# Patient Record
Sex: FEMALE | Race: WHITE | NOT HISPANIC OR LATINO | Employment: OTHER | URBAN - METROPOLITAN AREA
[De-identification: names, ages, dates, MRNs, and addresses within clinical notes are randomized per-mention and may not be internally consistent; named-entity substitution may affect disease eponyms.]

---

## 2017-01-10 ENCOUNTER — TRANSFERRED RECORDS (OUTPATIENT)
Dept: HEALTH INFORMATION MANAGEMENT | Facility: CLINIC | Age: 67
End: 2017-01-10

## 2017-03-31 ENCOUNTER — TELEPHONE (OUTPATIENT)
Dept: FAMILY MEDICINE | Facility: OTHER | Age: 67
End: 2017-03-31

## 2017-03-31 DIAGNOSIS — M81.0 OSTEOPOROSIS: ICD-10-CM

## 2017-03-31 NOTE — TELEPHONE ENCOUNTER
Fosamax    Last Written Prescription Date: 12/17/2016  Last Fill Quantity: 12, # refills: 0  Last Office Visit with [place], [facility], [place] or [facility] prescribing provider: 12/1/2016       DEXA Scan:  Last order of DX HIP/PELVIS/SPINE was found on 11/7/2013 from Hospital Encounter on 11/7/2013     No order of DX HIP/PELVIS/SPINE W LAT FRACTION ANALYSIS is found.       Creatinine   Date Value Ref Range Status   08/24/2016 0.89 0.52 - 1.04 mg/dL Final       Moni Vasquez MA

## 2017-04-04 RX ORDER — ALENDRONATE SODIUM 70 MG/1
TABLET ORAL
Qty: 4 TABLET | Refills: 0 | Status: SHIPPED | OUTPATIENT
Start: 2017-04-04 | End: 2017-05-01

## 2017-04-04 NOTE — TELEPHONE ENCOUNTER
Routing refill request to provider for review/approval because:  Labs not current:  MARINA Soria, RN, BSN

## 2017-04-24 NOTE — PROGRESS NOTES
SUBJECTIVE:                                                    Love Kaiser is a 67 year old female who presents to clinic today for the following health issues:      HPI    Medication Followup of Fosamax    Taking Medication as prescribed: yes    Side Effects:  None    Medication Helping Symptoms:  yes     MS: The patient reports that she has been taking fosamax for the past 5 years. Patient has a hx of osteopenia. She is inquiring about calcium supplementation.    Problem list and histories reviewed & adjusted, as indicated.  Additional history: as documented    Patient Active Problem List   Diagnosis     Esophageal reflux     Acute gastritis     Slow transit constipation     Osteoporosis     COPD (chronic obstructive pulmonary disease) (H)     Pneumonia     Atopic rhinitis     Hyperlipidemia LDL goal <160     Health Care Home     Hx of colonic polyp     Adenomatous polyp     Advanced directives, counseling/discussion     S/P shoulder replacement     Essential hypertension, benign     Impingement syndrome, shoulder, left     Prinzmetal angina (H)     Hyperlipidemia LDL goal <70     Macular degeneration     Past Surgical History:   Procedure Laterality Date     ARTHROPLASTY SHOULDER Right 5/26/2015    Procedure: ARTHROPLASTY SHOULDER;  Surgeon: Ashutosh Andrews DO;  Location: [facility] OR     C REMOVAL OF OVARY(S)       CL AFF SURGICAL PATHOLOGY  1974    parotid tumor with malignancy     COLONOSCOPY  09/21/09     COLONOSCOPY N/A 7/6/2016    Procedure: COMBINED COLONOSCOPY, SINGLE OR MULTIPLE BIOPSY/POLYPECTOMY BY BIOPSY;  Surgeon: John Bellamy MD;  Location: [facility] GI     EXCISE MASS FOOT  1/17/2012    Procedure:EXCISE MASS FOOT; Excision of Mass Right Foot; Surgeon:CARRIE PAGAN; Location:[facility] OR     FOREIGN BODY REMOVAL  07/08/10    Soft tissue mass w/peripheral metal fragments     HC COLONOSCOPY W BIOPSY  11/22/06     HC COLONOSCOPY W BIOPSY  10/31/07      UGI ENDOSCOPY DIAG W BIOPSY   "11/22/06     HYSTERECTOMY, PAP NO LONGER INDICATED       HYSTERECTOMY, FIDE         Social History   Substance Use Topics     Smoking status: Former Smoker     Packs/day: 0.50     Years: 29.00     Types: Cigarettes     Smokeless tobacco: Never Used     Alcohol use No      Comment: quit drinking     Family History   Problem Relation Age of Onset     Arthritis Mother      RA     CANCER Mother      female organs     CANCER Father      colon           ROS:  Constitutional, HEENT, cardiovascular, pulmonary, GI, , musculoskeletal, neuro, skin, endocrine and psych systems are negative, except as in HPI or otherwise noted     This document serves as a record of the services and decisions personally performed and made by Gauri Kenney MD. It was created on her behalf by Natalie Engel , a trained medical scribe. The creation of this document is based the provider's statements to the medical scribe.  Natalie Engel, May 1, 2017 11:02 AM     OBJECTIVE:                                                    /64  Pulse 64  Temp 97.9  F (36.6  C) (Temporal)  Resp 16  Ht 1.613 m (5' 3.5\")  Wt 77.1 kg (170 lb)  BMI 29.64 kg/m2  Body mass index is 29.64 kg/(m^2).   GENERAL: healthy, alert, well nourished, well hydrated, no distress  MS: extremities- no gross deformities noted, no edema  SKIN: no suspicious lesions, no rashes  PSYCH: Alert and oriented times 3; speech- coherent , normal rate and volume; able to articulate logical thoughts, able to abstract reason, no tangential thoughts, no hallucinations or delusions, affect- normal    No results found for this or any previous visit (from the past 24 hour(s)).     ASSESSMENT/PLAN:                                                        ICD-10-CM    1. Asymptomatic postmenopausal status Z78.0    2. Visit for screening mammogram Z12.31 MA SCREENING DIGITAL BILAT - Future  (s+30)   3. Need for prophylactic vaccination against Streptococcus pneumoniae (pneumococcus) Z23    4. " Osteoporosis M81.0      Discussed the extended use of fosamax already and last DEXA with only osteopenia.  Stop fosamax and discussed given her good calcium intake, would stop or at least decrease calcium supplementation.  Given handout on calicium and planning to get total to 1200 mg daily.  Asks about cardiology f/u.  Which I understood they would see her annually with our visit at 6 months in between, but his last notes are confusing.  She will call to double check on f/u.  Vitals doing well today, could refill meds if needed.      Patient Instructions   -Discontinue fosama.  -Amanuel Dawkins: [phone], ask about follow up.      The information in this document, created by the medical scribe for me, accurately reflects the services I personally performed and the decisions made by me. I have reviewed and approved this document for accuracy.   MD Gauri Freeman MD, MD  [facility]

## 2017-05-01 ENCOUNTER — OFFICE VISIT (OUTPATIENT)
Dept: FAMILY MEDICINE | Facility: OTHER | Age: 67
End: 2017-05-01
Payer: COMMERCIAL

## 2017-05-01 VITALS
HEART RATE: 64 BPM | OXYGEN SATURATION: 98 % | SYSTOLIC BLOOD PRESSURE: 122 MMHG | WEIGHT: 170 LBS | TEMPERATURE: 97.9 F | RESPIRATION RATE: 16 BRPM | HEIGHT: 64 IN | BODY MASS INDEX: 29.02 KG/M2 | DIASTOLIC BLOOD PRESSURE: 64 MMHG

## 2017-05-01 DIAGNOSIS — M81.0 OSTEOPOROSIS: ICD-10-CM

## 2017-05-01 DIAGNOSIS — Z23 NEED FOR PROPHYLACTIC VACCINATION AGAINST STREPTOCOCCUS PNEUMONIAE (PNEUMOCOCCUS): ICD-10-CM

## 2017-05-01 DIAGNOSIS — Z12.31 VISIT FOR SCREENING MAMMOGRAM: ICD-10-CM

## 2017-05-01 DIAGNOSIS — Z78.0 ASYMPTOMATIC POSTMENOPAUSAL STATUS: ICD-10-CM

## 2017-05-01 PROCEDURE — 99213 OFFICE O/P EST LOW 20 MIN: CPT | Mod: 25 | Performed by: FAMILY MEDICINE

## 2017-05-01 PROCEDURE — 90670 PCV13 VACCINE IM: CPT | Performed by: FAMILY MEDICINE

## 2017-05-01 PROCEDURE — G0009 ADMIN PNEUMOCOCCAL VACCINE: HCPCS | Performed by: FAMILY MEDICINE

## 2017-05-01 RX ORDER — ALENDRONATE SODIUM 70 MG/1
TABLET ORAL
Qty: 4 TABLET | Refills: 0 | Status: CANCELLED | OUTPATIENT
Start: 2017-05-01

## 2017-05-01 ASSESSMENT — PAIN SCALES - GENERAL: PAINLEVEL: NO PAIN (0)

## 2017-05-01 NOTE — NURSING NOTE
Screening Questionnaire for Adult Immunization    Are you sick today?   No   Do you have allergies to medications, food, a vaccine component or latex?   No   Have you ever had a serious reaction after receiving a vaccination?   No   Do you have a long-term health problem with heart disease, lung disease, asthma, kidney disease, metabolic disease (e.g. diabetes), anemia, or other blood disorder?   No   Do you have cancer, leukemia, HIV/AIDS, or any other immune system problem?   No   In the past 3 months, have you taken medications that affect  your immune system, such as prednisone, other steroids, or anticancer drugs; drugs for the treatment of rheumatoid arthritis, Crohn s disease, or psoriasis; or have you had radiation treatments?   No   Have you had a seizure, or a brain or other nervous system problem?   No   During the past year, have you received a transfusion of blood or blood     products, or been given immune (gamma) globulin or antiviral drug?   No   For women: Are you pregnant or is there a chance you could become        pregnant during the next month?   No   Have you received any vaccinations in the past 4 weeks?   No     Immunization questionnaire answers were all negative.      MNVFC doesn't apply on this patient    Per orders of Dr. Kenney, injection of prevnar given by Neha Benites. Patient instructed to remain in clinic for 20 minutes afterwards, and to report any adverse reaction to me immediately.       Screening performed by Neha Benites on 5/1/2017 at 11:34 AM.

## 2017-05-01 NOTE — NURSING NOTE
"Chief Complaint   Patient presents with     Consult     Osteoporosis     Health Maintenance     mychart, height, pneumovax, mammogram, dexa, fall risk, yearly hyperlipidema screen, copd action plan       Initial /64  Pulse 64  Temp 97.9  F (36.6  C) (Temporal)  Resp 16  Ht 5' 3.5\" (1.613 m)  Wt 170 lb (77.1 kg)  BMI 29.64 kg/m2 Estimated body mass index is 29.64 kg/(m^2) as calculated from the following:    Height as of this encounter: 5' 3.5\" (1.613 m).    Weight as of this encounter: 170 lb (77.1 kg).  Medication Reconciliation: complete   Neha Benites CMA    "

## 2017-05-01 NOTE — MR AVS SNAPSHOT
After Visit Summary   5/1/2017    Love Kaiser    MRN: [id number]           Patient Information     Date Of Birth          1950        Visit Information        Provider Department      5/1/2017 10:45 AM Gauri Kenney MD [facility]        Today's Diagnoses     Asymptomatic postmenopausal status        Visit for screening mammogram        Need for prophylactic vaccination against Streptococcus pneumoniae (pneumococcus)        Osteoporosis          Care Instructions    -Discontinue fosama.  -Amanuel Dawkins: [phone], ask about follow up.          Follow-ups after your visit        Your next 10 appointments already scheduled     May 04, 2017  3:30 PM CDT   MA SCREENING DIGITAL BILATERAL with [facility]MA[facility]   [facility]    [address] [address] [phone]           Do not use any powder, lotion or deodorant under your arms or on your breast. If you do, we will ask you to remove it before your exam.  Wear comfortable, two-piece clothing.  If you have any allergies, tell your care team.  Bring any previous mammograms from other facilities or have them mailed to the breast center.              Future tests that were ordered for you today     Open Future Orders        Priority Expected Expires Ordered    MA SCREENING DIGITAL BILAT - Future  (s+30) Routine  4/24/2018 5/1/2017            Who to contact     If you have questions or need follow up information about today's clinic visit or your schedule please contact [facility] directly at [phone].  Normal or non-critical lab and imaging results will be communicated to you by MyChart, letter or phone within 4 business days after the clinic has received the results. If you do not hear from us within 7 days, please contact the clinic through MyChart or phone. If you have a critical or abnormal lab result, we will notify you by phone as soon as  "possible.  Submit refill requests through [email or web address] or call your pharmacy and they will forward the refill request to us. Please allow 3 business days for your refill to be completed.          Additional Information About Your Visit        [email or web address] Information     [email or web address] lets you send messages to your doctor, view your test results, renew your prescriptions, schedule appointments and more. To sign up, go to [email or web address] . Click on \"Log in\" on the left side of the screen, which will take you to the Welcome page. Then click on \"Sign up Now\" on the right side of the page.     You will be asked to enter the access code listed below, as well as some personal information. Please follow the directions to create your username and password.     Your access code is: [email or web address]  Expires: 7/15/2017  2:01 PM     Your access code will  in 90 days. If you need help or a new code, please call your [facility] clinic or [phone].        Care EveryWhere ID     This is your Care EveryWhere ID. This could be used by other organizations to access your [facility] medical records  [id number]        Your Vitals Were     Pulse Temperature Respirations Height Pulse Oximetry BMI (Body Mass Index)    64 97.9  F (36.6  C) (Temporal) 16 5' 3.5\" (1.613 m) 98% 29.64 kg/m2       Blood Pressure from Last 3 Encounters:   17 122/64   16 122/86   16 124/78    Weight from Last 3 Encounters:   17 170 lb (77.1 kg)   16 179 lb (81.2 kg)   16 173 lb (78.5 kg)                 Today's Medication Changes          These changes are accurate as of: 17 11:27 AM.  If you have any questions, ask your nurse or doctor.               Stop taking these medicines if you haven't already. Please contact your care team if you have questions.     alendronate 70 MG tablet   Commonly known as:  FOSAMAX   Stopped by:  Gauri Kenney MD                    Primary Care Provider Office Phone # Fax #    Gauri Kenney, " -[phone]       [facility]  [address] [facility] [address]        Thank you!     Thank you for choosing [facility]  for your care. Our goal is always to provide you with excellent care. Hearing back from our patients is one way we can continue to improve our services. Please take a few minutes to complete the written survey that you may receive in the mail after your visit with us. Thank you!             Your Updated Medication List - Protect others around you: Learn how to safely use, store and throw away your medicines at www.disposemymeds.org.          This list is accurate as of: 5/1/17 11:27 AM.  Always use your most recent med list.                   Brand Name Dispense Instructions for use    albuterol 108 (90 BASE) MCG/ACT Inhaler    albuterol    8.5 g    INHALE 2 PUFFS INTO THE LUNGS EVERY 4 HOURS AS NEEDED FOR SHORTNESS OF BREATH / DYSPNEA.       ascorbic acid 500 MG tablet    VITAMIN C    90 Tab    ONE TABLET DAILY       aspirin 81 MG tablet     90 tablet    Take 1 tablet (81 mg) by mouth daily       calcium 600 MG tablet      daily       CARTIA  MG 24 hr capsule   Generic drug:  diltiazem      Take 180 mg by mouth daily       cyanocolbalamin 500 MCG tablet    vitamin  B-12     daily       LIPITOR PO      Take 40 mg by mouth daily       NITROGLYCERIN SL      Place 0.4 mg under the tongue Reported on 5/1/2017       potassium 75 MG Tabs      1 TABLET  DAILY       Vitamin D-3 Super Strength 2000 UNITS tablet   Generic drug:  cholecalciferol      daily

## 2017-05-04 ENCOUNTER — RADIANT APPOINTMENT (OUTPATIENT)
Dept: MAMMOGRAPHY | Facility: OTHER | Age: 67
End: 2017-05-04
Attending: FAMILY MEDICINE
Payer: COMMERCIAL

## 2017-05-04 DIAGNOSIS — Z12.31 VISIT FOR SCREENING MAMMOGRAM: ICD-10-CM

## 2017-05-04 PROCEDURE — G0202 SCR MAMMO BI INCL CAD: HCPCS | Mod: TC

## 2017-05-10 NOTE — PROGRESS NOTES
SUBJECTIVE:                                                    Love Kaiser is a 67 year old female who presents to clinic today for the following health issues:      HPI    Joint Pain     Onset: Sunday     Description:   Location: right shoulder  Character: Sharp and Stabbing    Intensity: severe    Progression of Symptoms: worse    Accompanying Signs & Symptoms:  Other symptoms: radiation of pain to chest/ribcage and swelling    - Can't sleep, every time she moves gets severe pain    History:   Previous similar pain: no       Precipitating factors:   Trauma or overuse: YES- fell on Sunday, tripped over grandchild and hit shoulder - had shoulder replaced about two years ago     -Fell on side of right arm (not on elbow)     Alleviating factors:  Improved by: nothing      - Dr. Andrews did shoulder replacement ~2 years ago       Therapies Tried and outcome: ice, heat, Ibuprofen - takes edge off       Problem list and histories reviewed & adjusted, as indicated.  Additional history: as documented    ROS:  Constitutional, HEENT, cardiovascular, pulmonary, gi and gu systems are negative, except as otherwise noted.    OBJECTIVE:                                                    /86 (BP Location: Right arm, Patient Position: Chair, Cuff Size: Adult Regular)  Pulse 92  Temp 97.9  F (36.6  C) (Oral)  Resp 20  Wt 168 lb (76.2 kg)  SpO2 96%  BMI 29.29 kg/m2  Body mass index is 29.29 kg/(m^2).  GENERAL APPEARANCE: healthy, alert and no distress  EYES: Eyes grossly normal to inspection, PERRLA, conjunctivae and sclerae without injection or discharge, EOM intact   MS: No musculoskeletal defects are noted and gait is age appropriate without ataxia       Left shoulder: Normal ROM, non-tender, no edema, CMS intact, normal sensory exam, normal strength      Right shoulder: Decreased ROM with all movements - painful with external rotation and flexion, tender to palpation of entire glenoid fossa and anterior portion  of scapula, moderate edema to anterior portion of glenoid, CMS intact, normal sensory exam, slightly decreased strength, negative belly press, can not perform Lift-off test, positive Hawking's and Neer's, remainder of exam deferred due to pain        Right elbow: Normal ROM, non-tender, no edema, CMS intact, normal sensory exam, normal strength  SKIN: No suspicious lesions or rashes, hydration status appears adeuqate with normal skin turgor   NEURO: Strength 5+ bilateral upper extremities, sensation intact in distal bilateral upper extremities, mentation- intact, speech- normal, reflexes- symmetric in bilateral upper extremities  PSYCH: Alert and oriented x3; speech- coherent , normal rate and volume; able to articulate logical thoughts, able to abstract reason, no tangential thoughts, no hallucinations or delusions, mentation appears normal, Mood is euthymic. Affect is appropriate for this mood state and bright. Thought content is free of suicidal ideation, hallucinations, and delusions. Dress is adequate and upkept. Eye contact is good during conversation.       Diagnostic Test Results:  XR right shoulder - Preliminary reading - normal bone structure with no evidence of fracture. Final pending review by radiology.        ASSESSMENT/PLAN:                                                        ICD-10-CM    1. Right shoulder injury, initial encounter S49.91XA XR Shoulder Right G/E 3 Views     oxyCODONE (ROXICODONE) 5 MG IR tablet     XR Shoulder Right G/E 3 Views   2. S/P shoulder replacement, right Z96.611 XR Shoulder Right G/E 3 Views     - Reviewed x-ray, no fractures or hardware issues seen, await radiology report   - Due to severe pain on exam and history of shoulder replacement, recommend evaluation by orthopedics   - Referral placed   - Until seen, recommend Ibuprofen during the day and Narcotic pain medication at night, reviewed use and side effects including sedation, no driving on this medication    The  patient indicates understanding of these issues and agrees with the plan.    Follow up: with specialist       Sunshine Cervantes PA-C  [facility]

## 2017-05-11 ENCOUNTER — OFFICE VISIT (OUTPATIENT)
Dept: FAMILY MEDICINE | Facility: OTHER | Age: 67
End: 2017-05-11
Payer: COMMERCIAL

## 2017-05-11 ENCOUNTER — OFFICE VISIT (OUTPATIENT)
Dept: ORTHOPEDICS | Facility: OTHER | Age: 67
End: 2017-05-11
Payer: COMMERCIAL

## 2017-05-11 ENCOUNTER — RADIANT APPOINTMENT (OUTPATIENT)
Dept: GENERAL RADIOLOGY | Facility: OTHER | Age: 67
End: 2017-05-11
Attending: PHYSICIAN ASSISTANT
Payer: COMMERCIAL

## 2017-05-11 VITALS
TEMPERATURE: 97.9 F | BODY MASS INDEX: 29.29 KG/M2 | WEIGHT: 168 LBS | RESPIRATION RATE: 20 BRPM | OXYGEN SATURATION: 96 % | SYSTOLIC BLOOD PRESSURE: 148 MMHG | DIASTOLIC BLOOD PRESSURE: 86 MMHG | HEART RATE: 92 BPM

## 2017-05-11 VITALS — HEIGHT: 64 IN | BODY MASS INDEX: 28.68 KG/M2 | TEMPERATURE: 97.9 F | WEIGHT: 168 LBS

## 2017-05-11 DIAGNOSIS — Z96.611 S/P SHOULDER REPLACEMENT, RIGHT: ICD-10-CM

## 2017-05-11 DIAGNOSIS — S49.91XA RIGHT SHOULDER INJURY, INITIAL ENCOUNTER: ICD-10-CM

## 2017-05-11 DIAGNOSIS — S49.91XA RIGHT SHOULDER INJURY, INITIAL ENCOUNTER: Primary | ICD-10-CM

## 2017-05-11 DIAGNOSIS — Z96.611 S/P SHOULDER HEMIARTHROPLASTY, RIGHT: Primary | ICD-10-CM

## 2017-05-11 PROCEDURE — 73030 X-RAY EXAM OF SHOULDER: CPT | Mod: RT

## 2017-05-11 PROCEDURE — 99213 OFFICE O/P EST LOW 20 MIN: CPT | Performed by: ORTHOPAEDIC SURGERY

## 2017-05-11 PROCEDURE — 99214 OFFICE O/P EST MOD 30 MIN: CPT | Performed by: PHYSICIAN ASSISTANT

## 2017-05-11 RX ORDER — OXYCODONE HYDROCHLORIDE 5 MG/1
5 TABLET ORAL EVERY 4 HOURS PRN
Qty: 30 TABLET | Refills: 0 | Status: SHIPPED | OUTPATIENT
Start: 2017-05-11 | End: 2018-12-04

## 2017-05-11 ASSESSMENT — PAIN SCALES - GENERAL
PAINLEVEL: EXTREME PAIN (9)
PAINLEVEL: MODERATE PAIN (5)

## 2017-05-11 NOTE — PROGRESS NOTES
"Office Visit-Follow up    Chief Complaint: Love Kaiser is a 67 year old female who is being seen for   Chief Complaint   Patient presents with     RECHECK     Right shoulder follow up- fell 5/7/17     Surgical Followup     DOS: 5/26/15~Right shoulder hemiarthroplasty~1 year and 2 weeks POST OP           History of Present Illness:   Presents with a complaint of his shoulder blade pain as well as anterior chest pain. She reports her shoulder is doing quite well. On May 7, 2017 she had a ground-level fall with a direct lateral fracture shoulder. Had some pain that evening. She thought she \"broke her ribs\". Approximately 36 hours later started having some increasing pain into her shoulder blade. Pain is worse with coughing and sneezing. She also has pain with reaching with her shoulder. Rates pain as moderate to severe at times. Describes a sharp ache. She's been taking a rare intermittent ibuprofen.    REVIEW OF SYSTEMS  General: negative for, night sweats, dizziness, fatigue  Resp: No shortness of breath and no cough  CV: negative for chest pain, syncope or near-syncope  GI: negative for nausea, vomiting and diarrhea  : negative for dysuria and hematuria  Musculoskeletal: as above  Neurologic: negative for syncope   Hematologic: negative for bleeding disorder    Physical Exam:  Vitals: Temp 97.9  F (36.6  C) (Temporal)  Ht 5' 3.5\" (1.613 m)  Wt 168 lb (76.2 kg)  BMI 29.29 kg/m2  BMI= Body mass index is 29.29 kg/(m^2).  Constitutional: healthy, alert and no acute distress   Psychiatric: mentation appears normal and affect normal/bright  NEURO: no focal deficits  RESP: Normal with easy respirations and no use of accessory muscles to breathe, no audible wheezing or retractions  CV: RUE:  no edema         Regular rate and rhythm by palpation  SKIN: No erythema, rashes, excoriation, or breakdown. No evidence of infection. , Previous well healed incisions/laceration: Deltopectoral incision well-healed " infection  JOINT/EXTREMITIES:right upper extremity: She has near full normal active range of motion. Resisted supraspinatus does cause some pain along the posterior shoulder blade. She has no focal areas of tenderness including over the shoulder blade and anterior chest. No apparent rib tenderness. She has no gross obvious instability. Distal neurovascular is intact.  GAIT: not tested             Diagnostic Modalities:  right shoulder X-ray: Hemiarthroplasty in place. No evidence of fractures. This includes the ribs.  Independent visualization of the images was performed.      Impression: right shoulder hemiarthroplasty-appearing appears to be functioning well  Right shoulder blade pain possible pulmonary contusion versus occult rib fracture    Plan:  All of the above pertinent physical exam and imaging modalities findings was reviewed with Love.    Treatment options discussed. I recommended ibuprofen 600 mg 3-4 times a day. I've encouraged her to work on deep breathing throughout the day. Her shoulder prosthesis appears to be functioning fine. Her symptoms appear to be more consistent with a pulmonary contusion versus an occult rib fracture. She has no overt respiratory distress.    Red flag symptoms reviewed. She should immediately present to the ER or call 911 if she has increasing shortness of breath.        Return to clinic 1, week(s), or sooner as needed for changes.  Re-x-ray on return: No    Maximino Andrews D.O.

## 2017-05-11 NOTE — MR AVS SNAPSHOT
"              After Visit Summary   2017    Love Kaiser    MRN: [id number]           Patient Information     Date Of Birth          1950        Visit Information        Provider Department      2017 10:00 AM Sunshine Cervantes PA-C [facility]        Today's Diagnoses     Right shoulder injury, initial encounter    -  1    S/P shoulder replacement, right          Care Instructions    - Ibuprofen 600 mg (3 tablets) - three times a day     - Follow up with Dr. Andrews           Follow-ups after your visit        Who to contact     If you have questions or need follow up information about today's clinic visit or your schedule please contact [facility] directly at [phone].  Normal or non-critical lab and imaging results will be communicated to you by siXishart, letter or phone within 4 business days after the clinic has received the results. If you do not hear from us within 7 days, please contact the clinic through siXishart or phone. If you have a critical or abnormal lab result, we will notify you by phone as soon as possible.  Submit refill requests through Alorica or call your pharmacy and they will forward the refill request to us. Please allow 3 business days for your refill to be completed.          Additional Information About Your Visit        siXishart Information     Alorica lets you send messages to your doctor, view your test results, renew your prescriptions, schedule appointments and more. To sign up, go to [email or web address] . Click on \"Log in\" on the left side of the screen, which will take you to the Welcome page. Then click on \"Sign up Now\" on the right side of the page.     You will be asked to enter the access code listed below, as well as some personal information. Please follow the directions to create your username and password.     Your access code is: [email or web address]  Expires: 7/15/2017  2:01 PM     Your access code will  " in 90 days. If you need help or a new code, please call your [facility] or [phone].        Care EveryWhere ID     This is your Care EveryWhere ID. This could be used by other organizations to access your [place] medical records  [id number]        Your Vitals Were     Pulse Temperature Respirations Pulse Oximetry BMI (Body Mass Index)       92 97.9  F (36.6  C) (Oral) 20 96% 29.29 kg/m2        Blood Pressure from Last 3 Encounters:   05/11/17 148/86   05/01/17 122/64   12/01/16 122/86    Weight from Last 3 Encounters:   05/11/17 168 lb (76.2 kg)   05/01/17 170 lb (77.1 kg)   12/01/16 179 lb (81.2 kg)                 Today's Medication Changes          These changes are accurate as of: 5/11/17 10:34 AM.  If you have any questions, ask your nurse or doctor.               Start taking these medicines.        Dose/Directions    oxyCODONE 5 MG IR tablet   Commonly known as:  ROXICODONE   Used for:  Right shoulder injury, initial encounter   Started by:  Sunshine Cervantes PA-C        Dose:  5 mg   Take 1 tablet (5 mg) by mouth every 4 hours as needed for pain maximum 3 tablet(s) per day   Quantity:  30 tablet   Refills:  0            Where to get your medicines      Some of these will need a paper prescription and others can be bought over the counter.  Ask your nurse if you have questions.     Bring a paper prescription for each of these medications     oxyCODONE 5 MG IR tablet                Primary Care Provider Office Phone # Fax #    Gauri Kenney [phone]       [facility]  [address] [address]        Thank you!     Thank you for choosing [facility]  for your care. Our goal is always to provide you with excellent care. Hearing back from our patients is one way we can continue to improve our services. Please take a few minutes to complete the written survey that you may receive in the mail after your visit with us. Thank  you!             Your Updated Medication List - Protect others around you: Learn how to safely use, store and throw away your medicines at www.disposemymeds.org.          This list is accurate as of: 5/11/17 10:34 AM.  Always use your most recent med list.                   Brand Name Dispense Instructions for use    albuterol 108 (90 BASE) MCG/ACT Inhaler    albuterol    8.5 g    INHALE 2 PUFFS INTO THE LUNGS EVERY 4 HOURS AS NEEDED FOR SHORTNESS OF BREATH / DYSPNEA.       ascorbic acid 500 MG tablet    VITAMIN C    90 Tab    ONE TABLET DAILY       aspirin 81 MG tablet     90 tablet    Take 1 tablet (81 mg) by mouth daily       calcium 600 MG tablet      daily       CARTIA  MG 24 hr capsule   Generic drug:  diltiazem      Take 180 mg by mouth daily       cyanocolbalamin 500 MCG tablet    vitamin  B-12     daily       LIPITOR PO      Take 40 mg by mouth daily       NITROGLYCERIN SL      Place 0.4 mg under the tongue Reported on 5/1/2017       oxyCODONE 5 MG IR tablet    ROXICODONE    30 tablet    Take 1 tablet (5 mg) by mouth every 4 hours as needed for pain maximum 3 tablet(s) per day       potassium 75 MG Tabs      1 TABLET  DAILY       Vitamin D-3 Super Strength 2000 UNITS tablet   Generic drug:  cholecalciferol      daily

## 2017-05-11 NOTE — NURSING NOTE
"Chief Complaint   Patient presents with     RECHECK     Right shoulder follow up- fell 5/7/17     Surgical Followup     DOS: 5/26/15~Right shoulder hemiarthroplasty~1 year and 2 weeks POST OP           Initial Temp 97.9  F (36.6  C) (Temporal)  Ht 1.613 m (5' 3.5\")  Wt 76.2 kg (168 lb)  BMI 29.29 kg/m2 Estimated body mass index is 29.29 kg/(m^2) as calculated from the following:    Height as of this encounter: 1.613 m (5' 3.5\").    Weight as of this encounter: 76.2 kg (168 lb).  Medication Reconciliation: complete   SUMMER Jones  "

## 2017-05-11 NOTE — MR AVS SNAPSHOT
After Visit Summary   5/11/2017    Love Kaiser    MRN: [id number]           Patient Information     Date Of Birth          1950        Visit Information        Provider Department      5/11/2017 3:00 PM Ashutosh Andrews DO [facility]         Follow-ups after your visit        Your next 10 appointments already scheduled     May 11, 2017  2:45 PM CDT   XR SHOULDER RIGHT G/E 2 VIEWS with [facility]   [facility]    [address] [id number] [phone]           Please bring a list of your current medicines to your exam. (Include vitamins, minerals and over-thecounter medicines.) Leave your valuables at home.  Tell your doctor if there is a chance you may be pregnant.  You do not need to do anything special for this exam.            May 11, 2017  3:00 PM CDT   Return Visit with Ashutosh Andrews DO   [facility]    [address]  [address]  [facility] [id number] [phone]              Who to contact     If you have questions or need follow up information about today's clinic visit or your schedule please contact [facility] directly at [phone].  Normal or non-critical lab and imaging results will be communicated to you by Sure Secure Solutionshart, letter or phone within 4 business days after the clinic has received the results. If you do not hear from us within 7 days, please contact the clinic through [employer] Solutionshart or phone. If you have a critical or abnormal lab result, we will notify you by phone as soon as possible.  Submit refill requests through [employer]doo (formerly OpenERP) or call your pharmacy and they will forward the refill request to us. Please allow 3 business days for your refill to be completed.          Additional Information About Your Visit        Odoo (formerly OpenERP) Information     Odoo (formerly OpenERP) lets you send messages to your doctor, view your test results, renew your  "prescriptions, schedule appointments and more. To sign up, go to [email or web address] . Click on \"Log in\" on the left side of the screen, which will take you to the Welcome page. Then click on \"Sign up Now\" on the right side of the page.     You will be asked to enter the access code listed below, as well as some personal information. Please follow the directions to create your username and password.     Your access code is: [email or web address]  Expires: 7/15/2017  2:01 PM     Your access code will  in 90 days. If you need help or a new code, please call your [place] clinic or [phone].        Care EveryWhere ID     This is your Care EveryWhere ID. This could be used by other organizations to access your [place] medical records  [id number]        Your Vitals Were     Temperature Height BMI (Body Mass Index)             97.9  F (36.6  C) (Temporal) 1.613 m (5' 3.5\") 29.29 kg/m2          Blood Pressure from Last 3 Encounters:   17 148/86   17 122/64   16 122/86    Weight from Last 3 Encounters:   17 76.2 kg (168 lb)   17 76.2 kg (168 lb)   17 77.1 kg (170 lb)              Today, you had the following     No orders found for display         Today's Medication Changes          These changes are accurate as of: 17  2:40 PM.  If you have any questions, ask your nurse or doctor.               Start taking these medicines.        Dose/Directions    oxyCODONE 5 MG IR tablet   Commonly known as:  ROXICODONE   Used for:  Right shoulder injury, initial encounter   Started by:  Sunshine Cervantes PA-C        Dose:  5 mg   Take 1 tablet (5 mg) by mouth every 4 hours as needed for pain maximum 3 tablet(s) per day   Quantity:  30 tablet   Refills:  0            Where to get your medicines      Some of these will need a paper prescription and others can be bought over the counter.  Ask your nurse if you have questions.     Bring a paper prescription for each of these " medications     oxyCODONE 5 MG IR tablet                Primary Care Provider Office Phone # Fax #    Gauri Kenney [phone]       [facility]  [address] [address]        Thank you!     Thank you for choosing [facility]  for your care. Our goal is always to provide you with excellent care. Hearing back from our patients is one way we can continue to improve our services. Please take a few minutes to complete the written survey that you may receive in the mail after your visit with us. Thank you!             Your Updated Medication List - Protect others around you: Learn how to safely use, store and throw away your medicines at www.disposemymeds.org.          This list is accurate as of: 5/11/17  2:40 PM.  Always use your most recent med list.                   Brand Name Dispense Instructions for use    albuterol 108 (90 BASE) MCG/ACT Inhaler    albuterol    8.5 g    INHALE 2 PUFFS INTO THE LUNGS EVERY 4 HOURS AS NEEDED FOR SHORTNESS OF BREATH / DYSPNEA.       ascorbic acid 500 MG tablet    VITAMIN C    90 Tab    ONE TABLET DAILY       aspirin 81 MG tablet     90 tablet    Take 1 tablet (81 mg) by mouth daily       calcium 600 MG tablet      daily       CARTIA  MG 24 hr capsule   Generic drug:  diltiazem      Take 180 mg by mouth daily       cyanocolbalamin 500 MCG tablet    vitamin  B-12     daily       LIPITOR PO      Take 40 mg by mouth daily       NITROGLYCERIN SL      Place 0.4 mg under the tongue Reported on 5/1/2017       oxyCODONE 5 MG IR tablet    ROXICODONE    30 tablet    Take 1 tablet (5 mg) by mouth every 4 hours as needed for pain maximum 3 tablet(s) per day       potassium 75 MG Tabs      1 TABLET  DAILY       Vitamin D-3 Super Strength 2000 UNITS tablet   Generic drug:  cholecalciferol      daily

## 2017-05-11 NOTE — LETTER
"  5/11/2017       RE: Love Kaiser  [address] [facility] [address]           Dear Colleague,    Thank you for referring your patient, Love Kaiser, to the [facility]. Please see a copy of my visit note below.    Office Visit-Follow up    Chief Complaint: Love Kaiser is a 67 year old female who is being seen for   Chief Complaint   Patient presents with     RECHECK     Right shoulder follow up- fell 5/7/17     Surgical Followup     DOS: 5/26/15~Right shoulder hemiarthroplasty~1 year and 2 weeks POST OP           History of Present Illness:   Presents with a complaint of his shoulder blade pain as well as anterior chest pain. She reports her shoulder is doing quite well. On May 7, 2017 she had a ground-level fall with a direct lateral fracture shoulder. Had some pain that evening. She thought she \"broke her ribs\". Approximately 36 hours later started having some increasing pain into her shoulder blade. Pain is worse with coughing and sneezing. She also has pain with reaching with her shoulder. Rates pain as moderate to severe at times. Describes a sharp ache. She's been taking a rare intermittent ibuprofen.    REVIEW OF SYSTEMS  General: negative for, night sweats, dizziness, fatigue  Resp: No shortness of breath and no cough  CV: negative for chest pain, syncope or near-syncope  GI: negative for nausea, vomiting and diarrhea  : negative for dysuria and hematuria  Musculoskeletal: as above  Neurologic: negative for syncope   Hematologic: negative for bleeding disorder    Physical Exam:  Vitals: Temp 97.9  F (36.6  C) (Temporal)  Ht 5' 3.5\" (1.613 m)  Wt 168 lb (76.2 kg)  BMI 29.29 kg/m2  BMI= Body mass index is 29.29 kg/(m^2).  Constitutional: healthy, alert and no acute distress   Psychiatric: mentation appears normal and affect normal/bright  NEURO: no focal deficits  RESP: Normal with easy respirations and no use of accessory muscles to breathe, no audible wheezing or " retractions  CV: RUE:  no edema         Regular rate and rhythm by palpation  SKIN: No erythema, rashes, excoriation, or breakdown. No evidence of infection. , Previous well healed incisions/laceration: Deltopectoral incision well-healed infection  JOINT/EXTREMITIES:right upper extremity: She has near full normal active range of motion. Resisted supraspinatus does cause some pain along the posterior shoulder blade. She has no focal areas of tenderness including over the shoulder blade and anterior chest. No apparent rib tenderness. She has no gross obvious instability. Distal neurovascular is intact.  GAIT: not tested             Diagnostic Modalities:  right shoulder X-ray: Hemiarthroplasty in place. No evidence of fractures. This includes the ribs.  Independent visualization of the images was performed.      Impression: right shoulder hemiarthroplasty-appearing appears to be functioning well  Right shoulder blade pain possible pulmonary contusion versus occult rib fracture    Plan:  All of the above pertinent physical exam and imaging modalities findings was reviewed with Love.    Treatment options discussed. I recommended ibuprofen 600 mg 3-4 times a day. I've encouraged her to work on deep breathing throughout the day. Her shoulder prosthesis appears to be functioning fine. Her symptoms appear to be more consistent with a pulmonary contusion versus an occult rib fracture. She has no overt respiratory distress.    Red flag symptoms reviewed. She should immediately present to the ER or call 911 if she has increasing shortness of breath.        Return to clinic 1, week(s), or sooner as needed for changes.  Re-x-ray on return: No    Maximino Andrews D.O.          Again, thank you for allowing me to participate in the care of your patient.        Sincerely,              Ashutosh Andrews, DO

## 2017-05-11 NOTE — NURSING NOTE
"Chief Complaint   Patient presents with     Shoulder Pain     Panel Management     MyChart, Fall risk, COPD action plan       Initial /86 (BP Location: Right arm, Patient Position: Chair, Cuff Size: Adult Regular)  Pulse 92  Temp 97.9  F (36.6  C) (Oral)  Resp 20  Wt 168 lb (76.2 kg)  SpO2 96%  BMI 29.29 kg/m2 Estimated body mass index is 29.29 kg/(m^2) as calculated from the following:    Height as of 5/1/17: 5' 3.5\" (1.613 m).    Weight as of this encounter: 168 lb (76.2 kg).  Medication Reconciliation: complete  "

## 2017-05-16 ENCOUNTER — TELEPHONE (OUTPATIENT)
Dept: FAMILY MEDICINE | Facility: OTHER | Age: 67
End: 2017-05-16

## 2017-05-16 NOTE — TELEPHONE ENCOUNTER
Reason for call:  Same Day Appointment  Reason for Call:  Same Day Appointment, Requested Provider:  Gauri Kenney MD     PCP: Gauri Kenney    Reason for visit: fu from [facility] broken ribs    Duration of symptoms: a week ago    Have you been treated for this in the past? Yes    Additional comments: is wondering if she could be worked in tomorrow with Dr Kenney. Ok to call tomorrow. Declined another provider    Can we leave a detailed message on this number? YES    Phone number patient can be reached at: Home number on file [phone] (home)    Best Time: any    Call taken on 5/16/2017 at 8:08 AM by Hien Wheat

## 2017-05-17 ENCOUNTER — OFFICE VISIT (OUTPATIENT)
Dept: FAMILY MEDICINE | Facility: OTHER | Age: 67
End: 2017-05-17
Payer: COMMERCIAL

## 2017-05-17 VITALS
DIASTOLIC BLOOD PRESSURE: 78 MMHG | RESPIRATION RATE: 16 BRPM | WEIGHT: 170 LBS | HEART RATE: 68 BPM | SYSTOLIC BLOOD PRESSURE: 122 MMHG | OXYGEN SATURATION: 96 % | HEIGHT: 64 IN | BODY MASS INDEX: 29.02 KG/M2 | TEMPERATURE: 99.2 F

## 2017-05-17 DIAGNOSIS — S22.41XD CLOSED FRACTURE OF MULTIPLE RIBS OF RIGHT SIDE WITH ROUTINE HEALING, SUBSEQUENT ENCOUNTER: Primary | ICD-10-CM

## 2017-05-17 PROCEDURE — 99213 OFFICE O/P EST LOW 20 MIN: CPT | Performed by: FAMILY MEDICINE

## 2017-05-17 RX ORDER — OXYCODONE HYDROCHLORIDE 5 MG/1
5 TABLET ORAL EVERY 4 HOURS PRN
COMMUNITY
Start: 2017-05-14 | End: 2017-05-17

## 2017-05-17 RX ORDER — DOCUSATE SODIUM 100 MG/1
100 CAPSULE, LIQUID FILLED ORAL DAILY
COMMUNITY
Start: 2017-05-14 | End: 2019-01-30

## 2017-05-17 RX ORDER — OXYCODONE HYDROCHLORIDE 5 MG/1
5 TABLET ORAL EVERY 4 HOURS PRN
Qty: 50 TABLET | Refills: 0 | Status: SHIPPED | OUTPATIENT
Start: 2017-05-17 | End: 2017-05-26

## 2017-05-17 ASSESSMENT — PAIN SCALES - GENERAL: PAINLEVEL: MODERATE PAIN (4)

## 2017-05-17 NOTE — PATIENT INSTRUCTIONS
-Deep breaths every 1 hours.   -Limit pain medication to 6 per day. Follow up if your pain is not improving and before you run out of meds.

## 2017-05-17 NOTE — PROGRESS NOTES
SUBJECTIVE:                                                    Love Kaiser is a 67 year old female who presents to clinic today for the following health issues:    HPI    ED/UC Followup:    Facility:  [facility]  Date of visit: 5/14/17  Reason for visit: Fx ribs  Current Status: still painful but improving     MS: The patient is following up from ED for right sided rib fractures. The patient is taking oxycodone for her pain and notes that she sometimes takes 2 tablets at a time for her pain. He last dosage was at 9 AM today.    Problem list and histories reviewed & adjusted, as indicated.  Additional history: as documented    Patient Active Problem List   Diagnosis     Esophageal reflux     Acute gastritis     Slow transit constipation     Osteoporosis     COPD (chronic obstructive pulmonary disease) (H)     Pneumonia     Atopic rhinitis     Hyperlipidemia LDL goal <160     Health Care Home     Hx of colonic polyp     Adenomatous polyp     Advanced directives, counseling/discussion     S/P shoulder replacement     Essential hypertension, benign     Impingement syndrome, shoulder, left     Prinzmetal angina (H)     Hyperlipidemia LDL goal <70     Macular degeneration     Past Surgical History:   Procedure Laterality Date     ARTHROPLASTY SHOULDER Right 5/26/2015    Procedure: ARTHROPLASTY SHOULDER;  Surgeon: Ashutosh Andrews DO;  Location: [facility] OR     C REMOVAL OF OVARY(S)       CL AFF SURGICAL PATHOLOGY  1974    parotid tumor with malignancy     COLONOSCOPY  09/21/09     COLONOSCOPY N/A 7/6/2016    Procedure: COMBINED COLONOSCOPY, SINGLE OR MULTIPLE BIOPSY/POLYPECTOMY BY BIOPSY;  Surgeon: John Bellamy MD;  Location: [facility] GI     EXCISE MASS FOOT  1/17/2012    Procedure:EXCISE MASS FOOT; Excision of Mass Right Foot; Surgeon:CARRIE PAGAN; Location:[facility] OR     FOREIGN BODY REMOVAL  07/08/10    Soft tissue mass w/peripheral metal fragments     HC COLONOSCOPY W BIOPSY  11/22/06     HC  "COLONOSCOPY W BIOPSY  10/31/07      UGI ENDOSCOPY DIAG W BIOPSY  11/22/06     HYSTERECTOMY, PAP NO LONGER INDICATED       HYSTERECTOMY, FIDE         Social History   Substance Use Topics     Smoking status: Current Every Day Smoker     Packs/day: 0.50     Years: 29.00     Types: Cigarettes     Smokeless tobacco: Never Used     Alcohol use No      Comment: quit drinking     Family History   Problem Relation Age of Onset     Arthritis Mother      RA     CANCER Mother      female organs     CANCER Father      colon           ROS:  Constitutional, HEENT, cardiovascular, pulmonary, GI, , musculoskeletal, neuro, skin, endocrine and psych systems are negative, except as in HPI or otherwise noted     This document serves as a record of the services and decisions personally performed and made by Gauri Kenney MD. It was created on her behalf by Natalie Engel , a trained medical scribe. The creation of this document is based the provider's statements to the medical scribe.  Natalie Engel, May 17, 2017 2:46 PM     OBJECTIVE:                                                    /78  Pulse 68  Temp 99.2  F (37.3  C) (Temporal)  Resp 16  Ht 1.613 m (5' 3.5\")  Wt 77.1 kg (170 lb)  SpO2 96%  BMI 29.64 kg/m2  Body mass index is 29.64 kg/(m^2).   GENERAL: healthy, alert, well nourished, well hydrated, no distress  RESP: initial pop open sound to lungs.  Later --lungs clear to auscultation - no rales, no rhonchi, no wheezes  CV: regular rates and rhythm, normal S1 S2, no S3 or S4 and no murmur, no click or rub -  MS: extremities- no gross deformities noted, no edema  SKIN: no suspicious lesions, no rashes  PSYCH: Alert and oriented times 3; speech- coherent , normal rate and volume; able to articulate logical thoughts, able to abstract reason, no tangential thoughts, no hallucinations or delusions, affect- normal    No results found for this or any previous visit (from the past 24 hour(s)).     ASSESSMENT/PLAN:            "                                             ICD-10-CM    1. Closed fracture of multiple ribs of right side with routine healing, subsequent encounter S22.41XD oxyCODONE (ROXICODONE) 5 MG IR tablet     Every 1 hour as awake for I/S.  Very important as she is at high risk for pneumonia given her respiratory history.  Pain meds given, f/u if pain is not improved or if needing pain meds in another week to make sure no pneumonia.      Patient Instructions   -Deep breaths every 2-3 hours.   -Limit pain medication to 6 per day. Follow up if your pain is not improving.    The information in this document, created by the medical scribe for me, accurately reflects the services I personally performed and the decisions made by me. I have reviewed and approved this document for accuracy.   MD Gauri Freeman MD, MD  [facility]

## 2017-05-17 NOTE — NURSING NOTE
"Chief Complaint   Patient presents with     ER F/U       Initial /78  Pulse 68  Temp 99.2  F (37.3  C) (Temporal)  Resp 16  Ht 5' 3.5\" (1.613 m)  Wt 170 lb (77.1 kg)  SpO2 96%  BMI 29.64 kg/m2 Estimated body mass index is 29.64 kg/(m^2) as calculated from the following:    Height as of this encounter: 5' 3.5\" (1.613 m).    Weight as of this encounter: 170 lb (77.1 kg).  Medication Reconciliation: complete  "

## 2017-05-26 DIAGNOSIS — S22.41XD CLOSED FRACTURE OF MULTIPLE RIBS OF RIGHT SIDE WITH ROUTINE HEALING, SUBSEQUENT ENCOUNTER: ICD-10-CM

## 2017-05-26 RX ORDER — OXYCODONE HYDROCHLORIDE 5 MG/1
5 TABLET ORAL EVERY 4 HOURS PRN
Qty: 15 TABLET | Refills: 0 | Status: SHIPPED | OUTPATIENT
Start: 2017-05-26 | End: 2018-12-04

## 2017-05-26 NOTE — TELEPHONE ENCOUNTER
Per pt, she has 3-4 remaining, and due to the long weekend wondering if she could have a couple more.  She stated has a lot of work to do in the yard therefore she know's she will experience pain.     Oxycodone      Last Written Prescription Date:  05/17/2017  Last Fill Quantity: 50 tab,   # refills: 0  Last Office Visit with [facility], [facility] or  [facility] prescribing provider: 05/17/2017  Future Office visit:       Routing refill request to provider for review/approval because:  Drug not on the [facility], [facility] or [facility] refill protocol or controlled substance    Routing to AE and provider pool to review in AE's absence

## 2017-05-26 NOTE — TELEPHONE ENCOUNTER
Reason for Call:  Medication or medication refill:    Do you use a [facility]?  Name of the pharmacy and phone number for the current request:  [facility] - [phone]    Name of the medication requested: Oxycodone 5mg prn    Other request: Pt has 3-4 remaining, and due to the long weekend wondering if she could have a couple more.  She stated has a lot of work to do in the yard therefore she know's she will experience pain.    Can we leave a detailed message on this number? YES    Phone number patient can be reached at: Home number on file [phone] (home)    Best Time: anytime    Call taken on 5/26/2017 at 7:18 AM by Gala Boothe

## 2017-11-08 DIAGNOSIS — E78.5 HYPERLIPEMIA: Primary | ICD-10-CM

## 2017-11-08 LAB
CHOLEST SERPL-MCNC: 157 MG/DL
HDLC SERPL-MCNC: 64 MG/DL
LDLC SERPL CALC-MCNC: 67 MG/DL
NONHDLC SERPL-MCNC: 93 MG/DL
TRIGL SERPL-MCNC: 132 MG/DL

## 2017-11-08 PROCEDURE — 36415 COLL VENOUS BLD VENIPUNCTURE: CPT | Performed by: INTERNAL MEDICINE

## 2017-11-08 PROCEDURE — 80061 LIPID PANEL: CPT | Performed by: INTERNAL MEDICINE

## 2017-12-06 ENCOUNTER — TRANSFERRED RECORDS (OUTPATIENT)
Dept: HEALTH INFORMATION MANAGEMENT | Facility: CLINIC | Age: 67
End: 2017-12-06

## 2017-12-09 DIAGNOSIS — J41.0 SIMPLE CHRONIC BRONCHITIS (H): ICD-10-CM

## 2017-12-09 DIAGNOSIS — J44.1 COPD EXACERBATION (H): ICD-10-CM

## 2017-12-11 RX ORDER — ALBUTEROL SULFATE 90 UG/1
AEROSOL, METERED RESPIRATORY (INHALATION)
Qty: 8.5 G | Refills: 5 | Status: SHIPPED | OUTPATIENT
Start: 2017-12-11 | End: 2019-02-13

## 2017-12-11 NOTE — TELEPHONE ENCOUNTER
Requested Prescriptions   Pending Prescriptions Disp Refills     PROAIR  (90 BASE) MCG/ACT inhaler [Pharmacy Med Name: ProAir HFA Inhalation Aerosol Solution 108 (90 Base) MCG/ACT] 8.5 g 0     Sig: INHALE TWO PUFFS BY MOUTH EVERY FOUR HOURS AS NEEDED FOR SHORTNESS OF BREATH OR DYSPNEA    Asthma Maintenance Inhalers - Anticholinergics Passed    12/11/2017  1:39 PM       Passed - Patient is age 12 years or older       Passed - Recent or future visit with authorizing provider's specialty    Patient had office visit in the last year or has a visit in the next 30 days with authorizing provider.  See chart review.               albuterol (ALBUTEROL) 108 (90 BASE) MCG/ACT Inhaler  Prescription approved per [facility] Refill Protocol.    Lesvia Haley, RN, BSN

## 2018-01-30 ENCOUNTER — TRANSFERRED RECORDS (OUTPATIENT)
Dept: HEALTH INFORMATION MANAGEMENT | Facility: CLINIC | Age: 68
End: 2018-01-30

## 2018-09-04 ENCOUNTER — OFFICE VISIT (OUTPATIENT)
Dept: FAMILY MEDICINE | Facility: OTHER | Age: 68
End: 2018-09-04
Payer: COMMERCIAL

## 2018-09-04 ENCOUNTER — TELEPHONE (OUTPATIENT)
Dept: FAMILY MEDICINE | Facility: OTHER | Age: 68
End: 2018-09-04

## 2018-09-04 ENCOUNTER — RADIANT APPOINTMENT (OUTPATIENT)
Dept: GENERAL RADIOLOGY | Facility: OTHER | Age: 68
End: 2018-09-04
Attending: PHYSICIAN ASSISTANT
Payer: COMMERCIAL

## 2018-09-04 VITALS
HEART RATE: 90 BPM | TEMPERATURE: 98.3 F | HEIGHT: 62 IN | WEIGHT: 161 LBS | DIASTOLIC BLOOD PRESSURE: 78 MMHG | OXYGEN SATURATION: 93 % | SYSTOLIC BLOOD PRESSURE: 128 MMHG | RESPIRATION RATE: 16 BRPM | BODY MASS INDEX: 29.63 KG/M2

## 2018-09-04 DIAGNOSIS — J44.1 COPD EXACERBATION (H): ICD-10-CM

## 2018-09-04 DIAGNOSIS — R05.9 COUGH: Primary | ICD-10-CM

## 2018-09-04 DIAGNOSIS — J22 LOWER RESPIRATORY TRACT INFECTION: ICD-10-CM

## 2018-09-04 DIAGNOSIS — R05.9 COUGH: ICD-10-CM

## 2018-09-04 PROCEDURE — 71046 X-RAY EXAM CHEST 2 VIEWS: CPT

## 2018-09-04 PROCEDURE — 99214 OFFICE O/P EST MOD 30 MIN: CPT | Performed by: PHYSICIAN ASSISTANT

## 2018-09-04 RX ORDER — PREDNISONE 20 MG/1
20 TABLET ORAL DAILY
Qty: 5 TABLET | Refills: 0 | Status: SHIPPED | OUTPATIENT
Start: 2018-09-04 | End: 2018-12-04

## 2018-09-04 RX ORDER — AZITHROMYCIN 250 MG/1
TABLET, FILM COATED ORAL
Qty: 6 TABLET | Refills: 0 | Status: SHIPPED | OUTPATIENT
Start: 2018-09-04 | End: 2018-12-04

## 2018-09-04 ASSESSMENT — PAIN SCALES - GENERAL: PAINLEVEL: NO PAIN (0)

## 2018-09-04 NOTE — TELEPHONE ENCOUNTER
Reason for call:  Patient reporting a symptom    Symptom or request: cough/breathing    Duration (how long have symptoms been present): 5 days     Have you been treated for this before? No    Additional comments: pt states thinks maybe has pneumonia or some virus. Pt is only wanting to see henrymayela. Was offered other providers. Pt wondering if henrymayela can work pt in tomorrow. Please advise    Phone Number patient can be reached at:  Home number on file [phone] (home)    Best Time:  ANY    Can we leave a detailed message on this number:  YES    Call taken on 9/4/2018 at 10:03 AM by Bijal Layne

## 2018-09-04 NOTE — PATIENT INSTRUCTIONS
- Azithromycin take as instructed for 5 days, lasts approximately for 10 days  - Start prednisone in the morning, take with food in stomach, once daily.    - Recommend starting over the counter plain mucinex   - Stay hydrated  - Albuterol every 4-6 hours  - Follow-up if not improving in the next 3-5 days, sooner if worse or new concerns.

## 2018-09-04 NOTE — PROGRESS NOTES
"  SUBJECTIVE:   Love Kaiser is a 68 year old female who presents to clinic today for the following health issues:      HPI  Acute Illness   Acute illness concerns: cough and SOB  Onset: Thursday morning    Fever: YES    Chills/Sweats: no    Headache (location?): YES    Sinus Pressure:no    Conjunctivitis:  YES: both    Ear Pain: no    Rhinorrhea: YES    Congestion: YES    Sore Throat: no     Headache: YES x 1 day.     Cough: YES-productive of clear sputum, productive of yellow sputum, with shortness of breath, worsening over time    Wheeze: YES    Decreased Appetite: YES    Nausea: no    Vomiting: no    Diarrhea:  no    Dysuria/Freq.: YES    Fatigue/Achiness: YES    Sick/Strep Exposure: YES; grand daughter     Therapies Tried and outcome: alkaseltzer plus- no aid  -Has been using Albuterol 6 times throughout the day during exertion for the last 6 days, gives immediate relief but states her symptoms have been getting worse overall.    - RN Triage 09/04/18:    Love Kaiser is a 68 year old female who calls with cough and SOB.     NURSING ASSESSMENT:  Description: Patient has a productive cough (unknown color as she is legally blind), congestion, and \"hard time breathing\". Breathing is constant, but worse with activity.   Onset/duration:  Thursday 8/30  Precip. factors: History of COPD. Has had pneumonia previously  Associated symptoms: \"Shaky\". \"Feverish\" at night, but doesn't have a thermometer  Improves/worsens symptoms: Inhalers help \"for a little bit\". Sharon Prince was helping, but not anymore  Pain scale (0-10)   0/10  Last exam/Treatment: 5/2017 with [facility].   Allergies:        Allergies   Allergen Reactions     Nickel Itching     No Known Drug Allergies           RECOMMENDED DISPOSITION:  See in 2-4 hours. Patient needs to find a ride, so is declining alternate locations. She does not want to go to [facility] as they did not have xray last time she was there. Will ask for work in this afternoon " with providers in clinic.   Will comply with recommendation: YES   If further questions/concerns or if Sx do not improve, worsen or new Sx develop, call your PCP or [facility] as soon as possible.     NOTES:  Disposition was determined by the first positive assessment question, therefore all previous assessment questions were negative.      Guideline used:  Telephone Triage Protocols for Nurses, Fifth Edition, Elisa Soria, RN, BSN      Problem list and histories reviewed & adjusted, as indicated.  Additional history: as documented    Patient Active Problem List   Diagnosis     Esophageal reflux     Acute gastritis     Slow transit constipation     Osteoporosis     COPD (chronic obstructive pulmonary disease) (H)     Pneumonia     Atopic rhinitis     Hyperlipidemia LDL goal <160     Health Care Home     Hx of colonic polyp     Adenomatous polyp     Advanced directives, counseling/discussion     S/P shoulder replacement     Essential hypertension, benign     Impingement syndrome, shoulder, left     Prinzmetal angina (H)     Hyperlipidemia LDL goal <70     Macular degeneration     Past Surgical History:   Procedure Laterality Date     ARTHROPLASTY SHOULDER Right 5/26/2015    Procedure: ARTHROPLASTY SHOULDER;  Surgeon: Ashutosh Andrews DO;  Location: [facility] OR     C REMOVAL OF OVARY(S)       [facility] AFF SURGICAL PATHOLOGY  1974    parotid tumor with malignancy     COLONOSCOPY  09/21/09     COLONOSCOPY N/A 7/6/2016    Procedure: COMBINED COLONOSCOPY, SINGLE OR MULTIPLE BIOPSY/POLYPECTOMY BY BIOPSY;  Surgeon: John Bellamy MD;  Location: [facility] GI     EXCISE MASS FOOT  1/17/2012    Procedure:EXCISE MASS FOOT; Excision of Mass Right Foot; Surgeon:CARRIE PAGAN; Location:[facility] OR     FOREIGN BODY REMOVAL  07/08/10    Soft tissue mass w/peripheral metal fragments     HC COLONOSCOPY W BIOPSY  11/22/06     [facility] COLONOSCOPY W BIOPSY  10/31/07      UGI ENDOSCOPY DIAG W BIOPSY  11/22/06      HYSTERECTOMY, PAP NO LONGER INDICATED       HYSTERECTOMY, FIDE         Social History   Substance Use Topics     Smoking status: Current Every Day Smoker     Packs/day: 0.50     Years: 29.00     Types: Cigarettes     Smokeless tobacco: Never Used     Alcohol use No      Comment: quit drinking     Family History   Problem Relation Age of Onset     Arthritis Mother      RA     Cancer Mother      female organs     Cancer Father      colon         Current Outpatient Prescriptions   Medication Sig Dispense Refill     aspirin 81 MG tablet Take 1 tablet (81 mg) by mouth daily 90 tablet 3     Atorvastatin Calcium (LIPITOR PO) Take 40 mg by mouth daily       azithromycin (ZITHROMAX) 250 MG tablet Two tablets first day, then one tablet daily for four days. 6 tablet 0     CARTIA  MG 24 hr capsule Take 180 mg by mouth daily  8     NITROGLYCERIN SL Place 0.4 mg under the tongue Reported on 5/17/2017       POTASSIUM 75 MG OR TABS 1 TABLET  DAILY       predniSONE (DELTASONE) 20 MG tablet Take 1 tablet (20 mg) by mouth daily 5 tablet 0     PROAIR  (90 BASE) MCG/ACT inhaler INHALE TWO PUFFS BY MOUTH EVERY FOUR HOURS AS NEEDED FOR SHORTNESS OF BREATH OR DYSPNEA 8.5 g 5     VITAMIN B-12 500 MCG OR TABS daily       VITAMIN C 500 MG OR TABS ONE TABLET DAILY 90 Tab 3     VITAMIN D-3 SUPER STRENGTH 2000 UNIT OR TABS daily       CALCIUM 600 MG OR TABS daily  3     docusate sodium (COLACE) 100 MG capsule Take 100 mg by mouth daily       oxyCODONE (ROXICODONE) 5 MG IR tablet Take 1 tablet (5 mg) by mouth every 4 hours as needed (Patient not taking: Reported on 9/4/2018) 15 tablet 0     oxyCODONE (ROXICODONE) 5 MG IR tablet Take 1 tablet (5 mg) by mouth every 4 hours as needed for pain maximum 3 tablet(s) per day (Patient not taking: Reported on 9/4/2018) 30 tablet 0     Allergies   Allergen Reactions     Nickel Itching     No Known Drug Allergies        ROS:  Constitutional, HEENT, cardiovascular, pulmonary, gi and gu systems  "are negative, except as otherwise noted. Patient reports urinary frequency is increased as a result of better hydration. Patient states she occasionally gets headaches.     OBJECTIVE:     /78  Pulse 90  Temp 98.3  F (36.8  C) (Temporal)  Resp 16  Ht 5' 2.05\" (1.576 m)  Wt 161 lb (73 kg)  SpO2 93%  BMI 29.4 kg/m2  Body mass index is 29.4 kg/(m^2).  GENERAL: healthy, alert and no distress  EYES: Eyes grossly normal to inspection, PERRL and conjunctivae and sclerae normal  HENT: ear canals and TM's normal, nose and mouth without ulcers or lesions  NECK: no adenopathy, no asymmetry, masses, or scars and thyroid normal to palpation  RESP: rhonchi diffusely bilaterally and faint crackle in left lower lobe   CV: regular rate and rhythm, normal S1 S2, no S3 or S4, no murmur, click or rub, no peripheral edema   MS: no gross musculoskeletal defects noted, no edema    Diagnostic Test Results:  Xray - Pending. Question left side lower lobe consolidation.    ASSESSMENT/PLAN:       ICD-10-CM    1. Cough R05 XR Chest 2 Views   2. Lower respiratory tract infection J22 azithromycin (ZITHROMAX) 250 MG tablet     predniSONE (DELTASONE) 20 MG tablet   3. COPD exacerbation (H) J44.1 azithromycin (ZITHROMAX) 250 MG tablet     predniSONE (DELTASONE) 20 MG tablet       The possible diagnosis of pneumonia was discussed due to reported symptoms, sick contact last week, and faint crackles in the left lower lobe were heard on exam. Chest x-ray shows a questionable left side lower lobe consolidation, pending reading by radiology.   Begin Azithromycin 250 mg bid today then 250 mg qday x 4 days as patient is high risk with her diagnosis of COPD and continues to smoke. Begin prednisone 20 mg qam x 5 days to help with COPD exacerbation, discussed potential side effects and how to administer the medication. Continue albuterol inhaler q4-6 hours prn. Mucinex OTC was discussed to help with congestion. Plan to follow up in 3-5 days if " symptoms have not improved. Patient was encouraged to call the clinic with any concerns prior to follow up.     Options for treatment and follow-up care were reviewed with the patient and/or guardian. Patient and/or guardian engaged in the decision making process and verbalized understanding of the options discussed and agreed with the final plan.     Irma Saez PA-C  [facility]

## 2018-09-04 NOTE — MR AVS SNAPSHOT
After Visit Summary   9/4/2018    Love Kaiser    MRN: [id number]           Patient Information     Date Of Birth          1950        Visit Information        Provider Department      9/4/2018 2:20 PM Irma Saez PA-C [facility]        Today's Diagnoses     Cough    -  1    Lower respiratory tract infection        COPD exacerbation (H)          Care Instructions    - Azithromycin take as instructed for 5 days, lasts approximately for 10 days  - Start prednisone in the morning, take with food in stomach, once daily.    - Recommend starting over the counter plain mucinex   - Stay hydrated  - Albuterol every 4-6 hours  - Follow-up if not improving in the next 3-5 days, sooner if worse or new concerns.           Follow-ups after your visit        Follow-up notes from your care team     Return in about 5 days (around 9/9/2018) for re-check if not improved.      Who to contact     If you have questions or need follow up information about today's clinic visit or your schedule please contact [facility] directly at [phone].  Normal or non-critical lab and imaging results will be communicated to you by MyChart, letter or phone within 4 business days after the clinic has received the results. If you do not hear from us within 7 days, please contact the clinic through MyChart or phone. If you have a critical or abnormal lab result, we will notify you by phone as soon as possible.  Submit refill requests through [employer] or call your pharmacy and they will forward the refill request to us. Please allow 3 business days for your refill to be completed.          Additional Information About Your Visit        Care EveryWhere ID     This is your Care EveryWhere ID. This could be used by other organizations to access your [facility] medical records  [id number]        Your Vitals Were     Pulse Temperature Respirations Height Pulse Oximetry BMI (Body Mass Index)    90  "98.3  F (36.8  C) (Temporal) 16 5' 2.05\" (1.576 m) 93% 29.4 kg/m2       Blood Pressure from Last 3 Encounters:   09/04/18 128/78   05/17/17 122/78   05/11/17 148/86    Weight from Last 3 Encounters:   09/04/18 161 lb (73 kg)   05/17/17 170 lb (77.1 kg)   05/11/17 168 lb (76.2 kg)                 Today's Medication Changes          These changes are accurate as of 9/4/18  3:03 PM.  If you have any questions, ask your nurse or doctor.               Start taking these medicines.        Dose/Directions    azithromycin 250 MG tablet   Commonly known as:  ZITHROMAX   Used for:  Lower respiratory tract infection, COPD exacerbation (H)   Started by:  Irma Saez PA-C        Two tablets first day, then one tablet daily for four days.   Quantity:  6 tablet   Refills:  0       predniSONE 20 MG tablet   Commonly known as:  DELTASONE   Used for:  Lower respiratory tract infection, COPD exacerbation (H)   Started by:  Irma Saez PA-C        Dose:  20 mg   Take 1 tablet (20 mg) by mouth daily   Quantity:  5 tablet   Refills:  0            Where to get your medicines      These medications were sent to [facility] [address] [place], [place] [id number]     Phone:  [phone]     azithromycin 250 MG tablet    predniSONE 20 MG tablet                Primary Care Provider Office Phone # Fax #    Gauri Mercedez Kenney [phone]       [address] [place] [id number]        Equal Access to Services     [facility] Hospital[facility]AN[facility] AH: Pavel paiz Soamrita, waaxda luqadaha, qaybta kaalmada zev virk. So [facility] [phone].    ATENCIÓN: Si habla español, tiene a elizalde disposición servicios gratuitos de asistencia lingüística. Llame al [phone].    We comply with applicable federal civil rights laws and [place] laws. We do not discriminate on the basis of race, color, national origin, age, disability, sex, sexual orientation, or " gender identity.            Thank you!     Thank you for choosing [facility]  for your care. Our goal is always to provide you with excellent care. Hearing back from our patients is one way we can continue to improve our services. Please take a few minutes to complete the written survey that you may receive in the mail after your visit with us. Thank you!             Your Updated Medication List - Protect others around you: Learn how to safely use, store and throw away your medicines at www.disposemymeds.org.          This list is accurate as of 9/4/18  3:03 PM.  Always use your most recent med list.                   Brand Name Dispense Instructions for use Diagnosis    ascorbic acid 500 MG tablet    VITAMIN C    90 Tab    ONE TABLET DAILY        aspirin 81 MG tablet     90 tablet    Take 1 tablet (81 mg) by mouth daily    Prinzmetal angina (H)       azithromycin 250 MG tablet    ZITHROMAX    6 tablet    Two tablets first day, then one tablet daily for four days.    Lower respiratory tract infection, COPD exacerbation (H)       calcium 600 MG tablet      daily        CARTIA  MG 24 hr capsule   Generic drug:  diltiazem      Take 180 mg by mouth daily        cyanocolbalamin 500 MCG tablet    vitamin  B-12     daily        docusate sodium 100 MG capsule    COLACE     Take 100 mg by mouth daily        LIPITOR PO      Take 40 mg by mouth daily        NITROGLYCERIN SL      Place 0.4 mg under the tongue Reported on 5/17/2017        * oxyCODONE IR 5 MG tablet    ROXICODONE    30 tablet    Take 1 tablet (5 mg) by mouth every 4 hours as needed for pain maximum 3 tablet(s) per day    Right shoulder injury, initial encounter       * oxyCODONE IR 5 MG tablet    ROXICODONE    15 tablet    Take 1 tablet (5 mg) by mouth every 4 hours as needed    Closed fracture of multiple ribs of right side with routine healing, subsequent encounter       potassium 75 MG Tabs      1 TABLET  DAILY        predniSONE 20 MG  tablet    DELTASONE    5 tablet    Take 1 tablet (20 mg) by mouth daily    Lower respiratory tract infection, COPD exacerbation (H)       PROAIR  (90 Base) MCG/ACT inhaler   Generic drug:  albuterol     8.5 g    INHALE TWO PUFFS BY MOUTH EVERY FOUR HOURS AS NEEDED FOR SHORTNESS OF BREATH OR DYSPNEA    Simple chronic bronchitis (H), COPD exacerbation (H)       Vitamin D-3 Super Strength 2000 units tablet   Generic drug:  cholecalciferol      daily        * Notice:  This list has 2 medication(s) that are the same as other medications prescribed for you. Read the directions carefully, and ask your doctor or other care provider to review them with you.

## 2018-09-04 NOTE — TELEPHONE ENCOUNTER
"Love Kaiser is a 68 year old female who calls with cough and SOB.    NURSING ASSESSMENT:  Description: Patient has a productive cough (unknown color as she is legally blind), congestion, and \"hard time breathing\". Breathing is constant, but worse with activity.   Onset/duration:  Thursday 8/30  Precip. factors: History of COPD. Has had pneumonia previously  Associated symptoms: \"Shaky\". \"Feverish\" at night, but doesn't have a thermometer  Improves/worsens symptoms: Inhalers help \"for a little bit\". Sharon Prince was helping, but not anymore  Pain scale (0-10)   0/10  Last exam/Treatment: 5/2017 with [facility].   Allergies:   Allergies   Allergen Reactions     Nickel Itching     No Known Drug Allergies        RECOMMENDED DISPOSITION:  See in 2-4 hours. Patient needs to find a ride, so is declining alternate locations. She does not want to go to [facility] as they did not have xray last time she was there. Will ask for work in this afternoon with providers in clinic.   Will comply with recommendation: YES   If further questions/concerns or if Sx do not improve, worsen or new Sx develop, call your PCP or [facility] as soon as possible.    NOTES:  Disposition was determined by the first positive assessment question, therefore all previous assessment questions were negative.     Guideline used:  Telephone Triage Protocols for Nurses, Fifth Edition, Elisa Soria, RN, BSN      "

## 2018-11-28 ENCOUNTER — TELEPHONE (OUTPATIENT)
Dept: FAMILY MEDICINE | Facility: OTHER | Age: 68
End: 2018-11-28

## 2018-11-28 NOTE — TELEPHONE ENCOUNTER
Panel Management Review      Patient has the following on her problem list:       IVD   ASA: Passed    Last LDL:    Lab Results   Component Value Date    CHOL 157 11/08/2017     Lab Results   Component Value Date    HDL 64 11/08/2017     Lab Results   Component Value Date    LDL 67 11/08/2017     Lab Results   Component Value Date    TRIG 132 11/08/2017        Lab Results   Component Value Date    CHOLHDLRATIO 3.65 08/26/2016        Is the patient on a Statin? YES   Is the patient on Aspirin? YES                  Medications     HMG CoA Reductase Inhibitors    Atorvastatin Calcium (LIPITOR PO)    Salicylates    aspirin 81 MG tablet          Last three blood pressure readings:  BP Readings from Last 3 Encounters:   09/04/18 128/78   05/17/17 122/78   05/11/17 148/86        Tobacco History:     History   Smoking Status     Current Every Day Smoker     Packs/day: 0.50     Years: 29.00     Types: Cigarettes   Smokeless Tobacco     Never Used       Composite cancer screening  Chart review shows that this patient is due/due soon for the following None  Summary:    Patient is due/failing the following:   COPD recheck, Pneumovax, Falls risk and LDL    Action needed:   Patient needs office visit for COPD re-check, update immunizations and fasting labs. .    Type of outreach:    Please call.     Questions for provider review:    None                                                                                                                                    Irma Saez PA-C     Chart routed to Care Team .

## 2018-11-29 NOTE — TELEPHONE ENCOUNTER
Patient informed. Scheduled for tomorrow, but she has to call around to see if she can get a ride. If not, she will call back to reschedule.  Leora Ramirez CMA    Next 5 appointments (look out 90 days)     Nov 30, 2018  9:40 AM CST   [facility]-C   [facility]    [address] [id number] [phone]

## 2018-11-30 NOTE — PROGRESS NOTES
SUBJECTIVE:   Love Kaiser is a 68 year old female who presents to clinic today for the following health issues:    Would like to talk about pneumonia that she hears on television, can't recall the name and would like to know if she should get vaccinated for that type of pneumonia. Also wants to know if she can take colace even if she is not taking the oxycodone.    HPI  Hyperlipidemia Follow-Up      Rate your low fat/cholesterol diet?: good    Taking statin?  Yes, possible muscle aches-but does not think it is related to the medication.     Other lipid medications/supplements?:  none    Hypertension Follow-up      Outpatient blood pressures are not being checked.    Low Salt Diet: no added salt-''seasoned to taste''    Hasn't had chest pain.    Due for follow-up with Cardiology.     COPD Follow-Up    Symptoms are currently: stable-same, short winded quicker    Current fatigue or dyspnea with ambulation: none    Shortness of breath: about the same    Increased or change in Cough/Sputum: No    Fever(s): No    Baseline ambulation without stopping to rest:  3 blocks. Able to walk up 1 flights of stairs without stopping to rest.    Any ER/UC or hospital admissions since your last visit? No     Only uses albuterol if needed, able to go out and shovel and go on walks and maybe 50% of the time will need to use the inhaler.     Not ready to quit smoking yet    History   Smoking Status     Current Every Day Smoker     Packs/day: 0.50     Years: 29.00     Types: Cigarettes   Smokeless Tobacco     Never Used     Lab Results   Component Value Date    FEV1 1.82 05/09/2013    SEQ2YRO 60 05/09/2013       Problem list and histories reviewed & adjusted, as indicated.  Additional history: as documented    Patient Active Problem List   Diagnosis     Esophageal reflux     Acute gastritis     Slow transit constipation     Osteoporosis     COPD (chronic obstructive pulmonary disease) (H)     Pneumonia     Atopic rhinitis      Hyperlipidemia LDL goal <160     Health Care Home     Hx of colonic polyp     Adenomatous polyp     Advanced directives, counseling/discussion     S/P shoulder replacement     Essential hypertension, benign     Impingement syndrome, shoulder, left     Prinzmetal angina (H)     Hyperlipidemia LDL goal <70     Macular degeneration     Past Surgical History:   Procedure Laterality Date     ARTHROPLASTY SHOULDER Right 5/26/2015    Procedure: ARTHROPLASTY SHOULDER;  Surgeon: Ashutosh Andrews DO;  Location: [facility] OR     C REMOVAL OF OVARY(S)       CL AFF SURGICAL PATHOLOGY  1974    parotid tumor with malignancy     COLONOSCOPY  09/21/09     COLONOSCOPY N/A 7/6/2016    Procedure: COMBINED COLONOSCOPY, SINGLE OR MULTIPLE BIOPSY/POLYPECTOMY BY BIOPSY;  Surgeon: John Bellamy MD;  Location: [facility] GI     EXCISE MASS FOOT  1/17/2012    Procedure:EXCISE MASS FOOT; Excision of Mass Right Foot; Surgeon:CARRIE PAGAN; Location:[facility] OR     FOREIGN BODY REMOVAL  07/08/10    Soft tissue mass w/peripheral metal fragments     HC COLONOSCOPY W BIOPSY  11/22/06     HC COLONOSCOPY W BIOPSY  10/31/07     [facility] UGI ENDOSCOPY DIAG W BIOPSY  11/22/06     HYSTERECTOMY, PAP NO LONGER INDICATED       HYSTERECTOMY, FIDE         Social History   Substance Use Topics     Smoking status: Current Every Day Smoker     Packs/day: 0.50     Years: 29.00     Types: Cigarettes     Smokeless tobacco: Never Used     Alcohol use No      Comment: quit drinking     Family History   Problem Relation Age of Onset     Arthritis Mother      RA     Cancer Mother      female organs     Cancer Father      colon         Current Outpatient Prescriptions   Medication Sig Dispense Refill     aspirin 81 MG tablet Take 1 tablet (81 mg) by mouth daily 90 tablet 3     Atorvastatin Calcium (LIPITOR PO) Take 40 mg by mouth daily       docusate sodium (COLACE) 100 MG capsule Take 100 mg by mouth daily       NITROGLYCERIN SL Place 0.4 mg under the tongue Reported on  "5/17/2017       POTASSIUM 75 MG OR TABS 1 TABLET  DAILY       PROAIR  (90 BASE) MCG/ACT inhaler INHALE TWO PUFFS BY MOUTH EVERY FOUR HOURS AS NEEDED FOR SHORTNESS OF BREATH OR DYSPNEA 8.5 g 5     VITAMIN B-12 500 MCG OR TABS daily       VITAMIN C 500 MG OR TABS ONE TABLET DAILY 90 Tab 3     VITAMIN D-3 SUPER STRENGTH 2000 UNIT OR TABS daily       CARTIA  MG 24 hr capsule Take 180 mg by mouth daily  8       ROS:  Constitutional, HEENT, cardiovascular, pulmonary, GI, , musculoskeletal, neuro, skin, endocrine and psych systems are negative, except as otherwise noted.    OBJECTIVE:     /84  Pulse 76  Temp 97.8  F (36.6  C) (Temporal)  Resp 16  Ht 5' 2.05\" (1.576 m)  Wt 160 lb (72.6 kg)  SpO2 97%  BMI 29.22 kg/m2  Body mass index is 29.22 kg/(m^2).  GENERAL: healthy, alert and no distress  NECK: no adenopathy, no asymmetry, masses, or scars and thyroid normal to palpation  RESP: lungs clear to auscultation - no rales, rhonchi or wheezes  CV: regular rate and rhythm, normal S1 S2, no S3 or S4, no murmur, click or rub, no peripheral edema and peripheral pulses strong, negative carotid bruits bilaterally.   MS: no gross musculoskeletal defects noted, no edema  SKIN: no suspicious lesions or rashes  NEURO: Normal strength and tone, mentation intact and speech normal  PSYCH: mentation appears normal, affect normal/bright    Diagnostic Test Results:  No results found for this or any previous visit (from the past 24 hour(s)).    ASSESSMENT/PLAN:       ICD-10-CM    1. Simple chronic bronchitis (H) J41.0    2. Need for prophylactic vaccination against Streptococcus pneumoniae (pneumococcus) Z23 PPSV23, IM/SUBQ (2+ YRS) - [id number]   3. Hyperlipidemia LDL goal <160 E78.5 Lipid panel reflex to direct LDL Fasting     Comprehensive metabolic panel   4. Essential hypertension, benign I10 Comprehensive metabolic panel   5. Screening for diabetes mellitus Z13.1 Comprehensive metabolic panel   6. Prinzmetal " angina (H) I20.1    7. Tobacco use disorder F17.200 Tobacco Cessation - Order to Satisfy Health Maintenance       1. COPD well controlled and not advancing, using albuterol just as needd.   2. Updated immunizations, declined influenza.  3/4/5: due for labs will call with results  6. Due for re-check with cardiology.   7. Not ready to quit yet, will let us know when she is.     Follow-up in 6 months for medication re-check, sooner if needed.  Discussed HM she is due for in the upcoming year.      Options for treatment and follow-up care were reviewed with the patient and/or guardian. Patient and/or guardian engaged in the decision making process and verbalized understanding of the options discussed and agreed with the final plan.     Irma Saez PA-C  [facility]

## 2018-12-04 ENCOUNTER — OFFICE VISIT (OUTPATIENT)
Dept: FAMILY MEDICINE | Facility: OTHER | Age: 68
End: 2018-12-04
Payer: COMMERCIAL

## 2018-12-04 VITALS
RESPIRATION RATE: 16 BRPM | OXYGEN SATURATION: 97 % | DIASTOLIC BLOOD PRESSURE: 84 MMHG | SYSTOLIC BLOOD PRESSURE: 130 MMHG | WEIGHT: 160 LBS | TEMPERATURE: 97.8 F | HEIGHT: 62 IN | HEART RATE: 76 BPM | BODY MASS INDEX: 29.44 KG/M2

## 2018-12-04 DIAGNOSIS — Z13.1 SCREENING FOR DIABETES MELLITUS: ICD-10-CM

## 2018-12-04 DIAGNOSIS — F17.200 TOBACCO USE DISORDER: ICD-10-CM

## 2018-12-04 DIAGNOSIS — Z23 NEED FOR PROPHYLACTIC VACCINATION AGAINST STREPTOCOCCUS PNEUMONIAE (PNEUMOCOCCUS): ICD-10-CM

## 2018-12-04 DIAGNOSIS — I10 ESSENTIAL HYPERTENSION, BENIGN: ICD-10-CM

## 2018-12-04 DIAGNOSIS — I20.1 PRINZMETAL ANGINA (H): ICD-10-CM

## 2018-12-04 DIAGNOSIS — J41.0 SIMPLE CHRONIC BRONCHITIS (H): Primary | ICD-10-CM

## 2018-12-04 DIAGNOSIS — E78.5 HYPERLIPIDEMIA LDL GOAL <160: ICD-10-CM

## 2018-12-04 LAB
ALBUMIN SERPL-MCNC: 4.3 G/DL (ref 3.4–5)
ALP SERPL-CCNC: 68 U/L (ref 40–150)
ALT SERPL W P-5'-P-CCNC: 23 U/L (ref 0–50)
ANION GAP SERPL CALCULATED.3IONS-SCNC: 6 MMOL/L (ref 3–14)
AST SERPL W P-5'-P-CCNC: 14 U/L (ref 0–45)
BILIRUB SERPL-MCNC: 0.8 MG/DL (ref 0.2–1.3)
BUN SERPL-MCNC: 9 MG/DL (ref 7–30)
CALCIUM SERPL-MCNC: 8.7 MG/DL (ref 8.5–10.1)
CHLORIDE SERPL-SCNC: 107 MMOL/L (ref 94–109)
CHOLEST SERPL-MCNC: 152 MG/DL
CO2 SERPL-SCNC: 27 MMOL/L (ref 20–32)
CREAT SERPL-MCNC: 0.77 MG/DL (ref 0.52–1.04)
GFR SERPL CREATININE-BSD FRML MDRD: 75 ML/MIN/1.7M2
GLUCOSE SERPL-MCNC: 91 MG/DL (ref 70–99)
HDLC SERPL-MCNC: 65 MG/DL
LDLC SERPL CALC-MCNC: 65 MG/DL
NONHDLC SERPL-MCNC: 87 MG/DL
POTASSIUM SERPL-SCNC: 4.3 MMOL/L (ref 3.4–5.3)
PROT SERPL-MCNC: 7.7 G/DL (ref 6.8–8.8)
SODIUM SERPL-SCNC: 140 MMOL/L (ref 133–144)
TRIGL SERPL-MCNC: 108 MG/DL

## 2018-12-04 PROCEDURE — 90732 PPSV23 VACC 2 YRS+ SUBQ/IM: CPT | Performed by: PHYSICIAN ASSISTANT

## 2018-12-04 PROCEDURE — 80061 LIPID PANEL: CPT | Performed by: PHYSICIAN ASSISTANT

## 2018-12-04 PROCEDURE — 80053 COMPREHEN METABOLIC PANEL: CPT | Performed by: PHYSICIAN ASSISTANT

## 2018-12-04 PROCEDURE — G0009 ADMIN PNEUMOCOCCAL VACCINE: HCPCS | Performed by: PHYSICIAN ASSISTANT

## 2018-12-04 PROCEDURE — 36415 COLL VENOUS BLD VENIPUNCTURE: CPT | Performed by: PHYSICIAN ASSISTANT

## 2018-12-04 PROCEDURE — 99214 OFFICE O/P EST MOD 30 MIN: CPT | Mod: 25 | Performed by: PHYSICIAN ASSISTANT

## 2018-12-04 ASSESSMENT — PAIN SCALES - GENERAL: PAINLEVEL: NO PAIN (0)

## 2018-12-04 NOTE — LETTER
My COPD Action Plan     Name: Love Kaiser    YOB: 1950   Date: 12/4/2018    My doctor: Irma Saez PA-C   My clinic: [address] [address] [phone]  My Controller Medicine: None   Dose: N/A     My Rescue Medicine: Albuterol (Proair/Ventolin/Proventil) inhaler   Dose: 1-2 puffs every 4-6 hours     My Flare Up Medicine: As is appropriate based on evaluation   Dose: N/A FEV-1 (no units)   Date Value   05/09/2013 1.82     FEV1/FVC (no units)   Date Value   05/09/2013 60      My COPD Severity: Moderate = FeV1 < 79% -50%      Use of Oxygen: Oxygen Not Prescribed      Make sure you've had your pneumonia   vaccines.          GREEN ZONE       Doing well today      Usual level of activity and exercise    Usual amount of cough and mucus    No shortness of breath    Usual level of health (thinking clearly, sleeping well, feel like eating) Actions:      Take daily medicines    Use oxygen as prescribed    Follow regular exercise and diet plan    Avoid cigarette smoke and other irritants that harm the lungs           YELLOW ZONE          Having a bad day or flare up      Short of breath more than usual    A lot more sputum (mucus) than usual    Sputum looks yellow, green, tan, brown or bloody    More coughing or wheezing    Fever or chills    Less energy; trouble completing activities    Trouble thinking or focusing    Using quick relief inhaler or nebulizer more often    Poor sleep; symptoms wake me up    Do not feel like eating Actions:      Get plenty of rest    Take daily medicines    Use quick relief inhaler every 4-6 hours    If you use oxygen, call you doctor to see if you should adjust your oxygen    Do breathing exercises or other things to help you relax    Let a loved one, friend or neighbor know you are feeling worse    Call your care team if you have 2 or more symptoms.  Start taking steroids or antibiotics if directed by your  care team           RED ZONE       Need medical care now      Severe shortness of breath (feel you can't breathe)    Fever, chills    Not enough breath to do any activity    Trouble coughing up mucus, walking or talking    Blood in mucus    Frequent coughing   Rescue medicines are not working    Not able to sleep because of breathing    Feel confused or drowsy    Chest pain    Actions:      Call your health care team.  If you cannot reach your care team, call 911 or go to the emergency room.        Annual Reminders:  Meet with Care Team, Flu Shot every Fall  Pharmacy:    [facility] [address] [place], [place] - [address] [facility] [place], [place] - [address]

## 2018-12-04 NOTE — MR AVS SNAPSHOT
After Visit Summary   12/4/2018    Love Kaiser    MRN: [id number]           Patient Information     Date Of Birth          1950        Visit Information        Provider Department      12/4/2018 9:00 AM Irma Saez PA-C [facility]        Today's Diagnoses     Simple chronic bronchitis (H)    -  1    Need for prophylactic vaccination and inoculation against influenza        Need for prophylactic vaccination against Streptococcus pneumoniae (pneumococcus)        Hyperlipidemia LDL goal <160        Essential hypertension, benign        Screening for diabetes mellitus          Care Instructions    Set up with Cardiology due in December:  Amanuel GREEN   [phone] (Work)  [phone] (Fax)  [address]  [address]  [place] [address]      We will call with labs  You are up to date on pneumonia vaccines.               Follow-ups after your visit        Follow-up notes from your care team     Return in about 6 months (around 6/4/2019) for Medication Re-check.      Who to contact     If you have questions or need follow up information about today's clinic visit or your schedule please contact [facility] directly at [phone].  Normal or non-critical lab and imaging results will be communicated to you by MyChart, letter or phone within 4 business days after the clinic has received the results. If you do not hear from us within 7 days, please contact the clinic through MyChart or phone. If you have a critical or abnormal lab result, we will notify you by phone as soon as possible.  Submit refill requests through [employer] or call your pharmacy and they will forward the refill request to us. Please allow 3 business days for your refill to be completed.          Additional Information About Your Visit        Care EveryWhere ID     This is your Care EveryWhere ID. This could be used by other organizations to access your [facility]  records  [id number]        Your Vitals Were     Pulse Temperature Respirations Pulse Oximetry BMI (Body Mass Index)       76 97.8  F (36.6  C) (Temporal) 16 97% 29.22 kg/m2        Blood Pressure from Last 3 Encounters:   12/04/18 130/84   09/04/18 128/78   05/17/17 122/78    Weight from Last 3 Encounters:   12/04/18 160 lb (72.6 kg)   09/04/18 161 lb (73 kg)   05/17/17 170 lb (77.1 kg)              We Performed the Following     Comprehensive metabolic panel     Lipid panel reflex to direct LDL Fasting     PAF COMPLETED     PPSV23, IM/SUBQ (2+ YRS) - [id number]          Today's Medication Changes          These changes are accurate as of 12/4/18  9:20 AM.  If you have any questions, ask your nurse or doctor.               Stop taking these medicines if you haven't already. Please contact your care team if you have questions.     oxyCODONE 5 MG tablet   Commonly known as:  ROXICODONE   Stopped by:  Irma Saez PA-C                    Primary Care Provider Office Phone # Fax #    Gauri Kenney [phone]       [address] [place] [address]        Equal Access to Services     DOTTIE ULLOA : Pavel paiz Soamrita, wamarioda roxanne, qaybta kaalmada sully, zev berry. So [facility] [phone].    ATENCIÓN: Si habla español, tiene a elizalde disposición servicios gratuitos de asistencia lingüística. Llame al [phone].    We comply with applicable federal civil rights laws and [place] laws. We do not discriminate on the basis of race, color, national origin, age, disability, sex, sexual orientation, or gender identity.            Thank you!     Thank you for choosing [facility]  for your care. Our goal is always to provide you with excellent care. Hearing back from our patients is one way we can continue to improve our services. Please take a few minutes to complete the written survey that you may receive in the mail after your visit with  us. Thank you!             Your Updated Medication List - Protect others around you: Learn how to safely use, store and throw away your medicines at www.disposemymeds.org.          This list is accurate as of 12/4/18  9:20 AM.  Always use your most recent med list.                   Brand Name Dispense Instructions for use Diagnosis    aspirin 81 MG tablet    ASA    90 tablet    Take 1 tablet (81 mg) by mouth daily    Prinzmetal angina (H)       CARTIA  MG 24 hr capsule   Generic drug:  diltiazem ER COATED BEADS      Take 180 mg by mouth daily        docusate sodium 100 MG capsule    COLACE     Take 100 mg by mouth daily        LIPITOR PO      Take 40 mg by mouth daily        NITROGLYCERIN SL      Place 0.4 mg under the tongue Reported on 5/17/2017        potassium 75 MG Tabs      1 TABLET  DAILY        PROAIR  (90 Base) MCG/ACT inhaler   Generic drug:  albuterol     8.5 g    INHALE TWO PUFFS BY MOUTH EVERY FOUR HOURS AS NEEDED FOR SHORTNESS OF BREATH OR DYSPNEA    Simple chronic bronchitis (H), COPD exacerbation (H)       vitamin B-12 500 MCG tablet    CYANOCOBALAMIN     daily        vitamin C 500 MG tablet    ASCORBIC ACID    90 Tab    ONE TABLET DAILY        Vitamin D-3 Super Strength 2000 units tablet   Generic drug:  cholecalciferol      daily

## 2018-12-04 NOTE — PATIENT INSTRUCTIONS
Set up with Cardiology due in December:  Amanuel GREEN   [phone] (Work)  [phone] (Fax)  [address] [facility]  [address]  [facility] [place] [address]      We will call with labs  You are up to date on pneumonia vaccines.

## 2018-12-18 ENCOUNTER — TELEPHONE (OUTPATIENT)
Dept: FAMILY MEDICINE | Facility: OTHER | Age: 68
End: 2018-12-18

## 2018-12-18 NOTE — TELEPHONE ENCOUNTER
Reason for Call:  Request for results:    Name of test or procedure: labs    Date of test of procedure: 12-4-18    Location of the test or procedure: [place]    OK to leave the result message on voice mail or with a family member? YES    Phone number Patient can be reached at:  Home number on file [phone] (home)    Additional comments: will need her results faxed to [facility]. Fax is [phone].    Call taken on 12/18/2018 at 8:18 AM by Hien Wheat

## 2019-01-17 ENCOUNTER — TRANSFERRED RECORDS (OUTPATIENT)
Dept: HEALTH INFORMATION MANAGEMENT | Facility: CLINIC | Age: 69
End: 2019-01-17

## 2019-01-21 ENCOUNTER — TRANSFERRED RECORDS (OUTPATIENT)
Dept: HEALTH INFORMATION MANAGEMENT | Facility: CLINIC | Age: 69
End: 2019-01-21

## 2019-01-22 ENCOUNTER — PATIENT OUTREACH (OUTPATIENT)
Dept: CARE COORDINATION | Facility: CLINIC | Age: 69
End: 2019-01-22

## 2019-01-22 ASSESSMENT — ACTIVITIES OF DAILY LIVING (ADL): DEPENDENT_IADLS:: INDEPENDENT

## 2019-01-22 NOTE — PATIENT INSTRUCTIONS
DC'd  from [facility] on 1/21/19 to home self care   Primary Problem: none  LACE Score: 52

## 2019-01-22 NOTE — LETTER
[place] CARE COORDINATION  [address] [facility] [address]      January 22, 2019    Love Kaiser  [address] [place] [address]      Dear Love,    I am a clinic care coordinator who works with Gauri Kenney MD, MD at [facility].  I wanted to introduce myself and provide you with my contact information so that you can call me with questions or concerns about your health care. Below is a description of clinic care coordination and how I can further assist you.     The clinic care coordinator is a registered nurse and/or  who understand the health care system. The goal of clinic care coordination is to help you manage your health and improve access to the [facility] system in the most efficient manner. The registered nurse can assist you in meeting your health care goals by providing education, coordinating services, and strengthening the communication among your providers. The  can assist you with financial, behavioral, psychosocial, chemical dependency, counseling, and/or psychiatric resources.    Please feel free to contact me at [phone], with any questions or concerns. We at [facility] are focused on providing you with the highest-quality healthcare experience possible and that all starts with you.     Sincerely,     PAMELA Ocasio RN Clinic Care Coordinator   [facility]  Phone: [phone]     Enclosed: I have enclosed a copy of a 24 Hour Access Plan. This has helpful phone numbers for you to call when needed. Please keep this in an easy to access place to use as needed.

## 2019-01-22 NOTE — PROGRESS NOTES
"Clinic Care Coordination Contact  Clinic Care Coordination Contact  OUTREACH  Referral Information:  Referral Source: [facility]  Primary Diagnosis: Injury/Fall(left wrist fracture )  Chief Complaint   Patient presents with     Clinic Care Coordination - Post Hospital     Dc'd from [facility] on 1/21/19 to home self care    [place] Utilization: uses [facility] for ER services.   Clinic Utilization  Difficulty keeping appointments:: No  Compliance Concerns: No  No-Show Concerns: No  No PCP office visit in Past Year: No  Utilization    Last refreshed: 1/22/2019  2:51 PM:  Hospital Admissions 0           Last refreshed: 1/22/2019  2:51 PM:  ED Visits 0           Last refreshed: 1/22/2019  2:51 PM:  No Show Count (past year) 0              Current as of: 1/22/2019  2:51 PM          Clinical Concerns:  Current Medical Concerns:  Patient in the [facility] ER for left wrist fracture. She had sedation, as her fracture needed to be reduced. She is waiting for a call from [facility] ortho surgeon, wearing a splint on for 7 days. Wearing a sling. Taking it easy at home. Her son picked up her prescription for  Percocet. Her pain was \"pretty rough\" this morning. Took 1 at noon,  It did nothing, she waited 1 hour, took another tab and has been resting on an off and pain is manageable.  Last night she took ibuprofen and was helpful. Discussed over the counter stool softener, and risk for falls with narcotic use. She verbalized understanding.   Her neighbor is a close friend and is a phone call away. She is legally blind and does not drive. She can see, but it \"miserable\" to some tasks. Her son takes her grocery shopping. She lives alone. Not connected with the [facility]. But overall feels she is managing well at home. Should she feel differently she will reach out to clinic or CC RN with needs.      She had a good experience with the [facility] staff.     Current Behavioral Concerns: none noted.     Education Provided to " patient: RN CC educated about Care Coordination Services, discharge instructions, medications reviewed and follow up   Pain  Pain (GOAL):: Yes  Type: Acute (<3mo)  Location of chronic pain:: left wrist  Radiating: No  Progression: Improving  Limitation of routine activities due to chronic pain:: Yes  Description: Unable to perform most daily activities (chores, hobbies, social activities, driving)  Alleviating Factors: Rest, Ice, Pain Medication  Health Maintenance Reviewed: Not assessed  Clinical Pathway: None    Medication Management:  Patient independent in medication management and verbalizes adherence and understanding of medication regimen.      Functional Status:  Dependent ADLs:: Independent  Dependent IADLs:: Independent  Bed or wheelchair confined:: No  Mobility Status: Independent    Living Situation:  Current living arrangement:: I live alone  Type of residence:: Private home - stairs    Diet/Exercise/Sleep:  Food Insecurity: No  Tube Feeding: No    Transportation:  Transportation concerns (GOAL):: No(pt is legally blind, but has family and friends who can drive her)  Transportation means:: Regular car     Psychosocial:  Informal Support system:: Family     Financial/Insurance:   Financial/Insurance concerns (GOAL):: No    Resources and Interventions:  Current Resources: ER discharge summary. Access plan, and intro letter.  Advance Care Plan/Directive  Advanced Care Plans/Directives on file:: No  Goals: na  Patient/Caregiver understanding: yes    Plan:   1. Follow up with  [facility] ortho surgeon, follow ER discharge summary. Let us or ortho know of any questions or concerns.   2. Patient declined a need for care coordination. However, patient can reach out to CC RN as needed and CC RN will be available to patient.   3. Sent access care plan and intro letter    PAMELA Ocasio RN Clinic Care Coordinator   [place], [place], [place]  Phone: [phone]

## 2019-01-22 NOTE — LETTER
Health Care Home - Access Care Plan    About Me  Patient Name:  Love Kaiser    YOB: 1950  Age:                             68 year old   [place] MRN:            [id number] Telephone Information:   Home Phone [phone]   Mobile [phone]       Address:    [address] [facility] [address] Email address:  No e-mail address on record      Emergency Contact(s)  Name Relationship Lgl Grd Work Phone Home Phone Mobile Phone   1. KONG LINDER Son    [phone]   2. LYLE KAISER Son    [phone]             Health Maintenance: Routine Health maintenance Reviewed: Not assessed    My Access Plan  Medical Emergency 911   Questions or concerns during clinic hours Primary Clinic Line, I will call the clinic directly: [facility] - [phone]   24 Hour Appointment Line [phone] or  [phone]) (toll free)   24 Hour Nurse Line [phone] (toll free)   Questions or concerns outside clinic hours 24 Hour Appointment Line, I will call the after-hours on-call line:   [facility] [phone] or [phone]) (toll-free)   Preferred Urgent Care     Preferred Hospital [facility]  [phone]   Preferred Pharmacy [facility] [address] [place] [address] [facility]     Behavioral Health Crisis Line The National Suicide Prevention Lifeline at 1-918.825.9907 or 918     My Care Team Members  Patient Care Team       Relationship Specialty Notifications Start End    Gauri eKnney MD PCP - General   10/28/00     Phone: [phone] Fax: [phone]         [address] [facility] [address]    Irma Saez PA-C PCP - Assigned PCP   9/9/18     Phone: [phone] Fax: [phone]         [address] [facility] [address]    Leslie Tobar LSW    5/29/15     Phone: [phone] Fax: [phone]         [facility] [address] [facility] [address]    Caroline Sen, RN Clinic Care  Coordinator Primary Care - CC Admissions 1/22/19 1/22/19    Phone: [phone] Fax: [phone]               My Medical and Care Information  Problem List   Patient Active Problem List   Diagnosis     Esophageal reflux     Acute gastritis     Slow transit constipation     Osteoporosis     COPD (chronic obstructive pulmonary disease) (H)     Pneumonia     Atopic rhinitis     Hyperlipidemia LDL goal <160     Health Care Home     Hx of colonic polyp     Adenomatous polyp     Advanced directives, counseling/discussion     S/P shoulder replacement     Essential hypertension, benign     Impingement syndrome, shoulder, left     Prinzmetal angina (H)     Hyperlipidemia LDL goal <70     Macular degeneration

## 2019-01-30 ENCOUNTER — OFFICE VISIT (OUTPATIENT)
Dept: FAMILY MEDICINE | Facility: OTHER | Age: 69
End: 2019-01-30
Payer: COMMERCIAL

## 2019-01-30 ENCOUNTER — OFFICE VISIT (OUTPATIENT)
Dept: ORTHOPEDICS | Facility: OTHER | Age: 69
End: 2019-01-30
Payer: COMMERCIAL

## 2019-01-30 ENCOUNTER — TELEPHONE (OUTPATIENT)
Dept: ORTHOPEDICS | Facility: OTHER | Age: 69
End: 2019-01-30

## 2019-01-30 ENCOUNTER — ANCILLARY PROCEDURE (OUTPATIENT)
Dept: GENERAL RADIOLOGY | Facility: OTHER | Age: 69
End: 2019-01-30
Payer: COMMERCIAL

## 2019-01-30 VITALS
DIASTOLIC BLOOD PRESSURE: 86 MMHG | RESPIRATION RATE: 18 BRPM | HEART RATE: 74 BPM | OXYGEN SATURATION: 97 % | WEIGHT: 160 LBS | SYSTOLIC BLOOD PRESSURE: 140 MMHG | BODY MASS INDEX: 28.35 KG/M2 | HEIGHT: 63 IN

## 2019-01-30 VITALS — HEIGHT: 63 IN | BODY MASS INDEX: 28.35 KG/M2 | WEIGHT: 160 LBS

## 2019-01-30 DIAGNOSIS — J41.0 SIMPLE CHRONIC BRONCHITIS (H): ICD-10-CM

## 2019-01-30 DIAGNOSIS — Z01.818 PRE-OP EXAM: ICD-10-CM

## 2019-01-30 DIAGNOSIS — K59.09 OTHER CONSTIPATION: ICD-10-CM

## 2019-01-30 DIAGNOSIS — Z01.818 PREOP GENERAL PHYSICAL EXAM: Primary | ICD-10-CM

## 2019-01-30 DIAGNOSIS — K21.00 GASTROESOPHAGEAL REFLUX DISEASE WITH ESOPHAGITIS: ICD-10-CM

## 2019-01-30 DIAGNOSIS — S52.532A CLOSED COLLES' FRACTURE OF LEFT RADIUS, INITIAL ENCOUNTER: ICD-10-CM

## 2019-01-30 DIAGNOSIS — Z71.6 ENCOUNTER FOR SMOKING CESSATION COUNSELING: ICD-10-CM

## 2019-01-30 DIAGNOSIS — M80.00XA OSTEOPOROSIS WITH CURRENT PATHOLOGICAL FRACTURE, UNSPECIFIED OSTEOPOROSIS TYPE, INITIAL ENCOUNTER: ICD-10-CM

## 2019-01-30 DIAGNOSIS — I10 ESSENTIAL HYPERTENSION, BENIGN: ICD-10-CM

## 2019-01-30 DIAGNOSIS — E78.5 HYPERLIPIDEMIA LDL GOAL <70: ICD-10-CM

## 2019-01-30 DIAGNOSIS — M25.532 LEFT WRIST PAIN: ICD-10-CM

## 2019-01-30 DIAGNOSIS — I20.1 PRINZMETAL ANGINA (H): ICD-10-CM

## 2019-01-30 DIAGNOSIS — S52.532A CLOSED COLLES' FRACTURE OF LEFT RADIUS, INITIAL ENCOUNTER: Primary | ICD-10-CM

## 2019-01-30 PROCEDURE — 99214 OFFICE O/P EST MOD 30 MIN: CPT | Mod: 25 | Performed by: ORTHOPAEDIC SURGERY

## 2019-01-30 PROCEDURE — 93000 ELECTROCARDIOGRAM COMPLETE: CPT | Performed by: FAMILY MEDICINE

## 2019-01-30 PROCEDURE — 29125 APPL SHORT ARM SPLINT STATIC: CPT | Performed by: ORTHOPAEDIC SURGERY

## 2019-01-30 PROCEDURE — 73110 X-RAY EXAM OF WRIST: CPT | Mod: LT

## 2019-01-30 PROCEDURE — 99214 OFFICE O/P EST MOD 30 MIN: CPT | Performed by: FAMILY MEDICINE

## 2019-01-30 RX ORDER — OXYCODONE AND ACETAMINOPHEN 5; 325 MG/1; MG/1
1 TABLET ORAL EVERY 6 HOURS PRN
Status: ON HOLD | COMMUNITY
End: 2019-02-01

## 2019-01-30 RX ORDER — BUPROPION HYDROCHLORIDE 150 MG/1
150 TABLET ORAL EVERY MORNING
Qty: 90 TABLET | Refills: 1 | Status: SHIPPED | OUTPATIENT
Start: 2019-01-30 | End: 2021-07-23

## 2019-01-30 RX ORDER — DOCUSATE SODIUM 100 MG/1
100 CAPSULE, LIQUID FILLED ORAL DAILY
Qty: 90 CAPSULE | Refills: 1 | Status: SHIPPED | OUTPATIENT
Start: 2019-01-30 | End: 2022-11-29

## 2019-01-30 ASSESSMENT — PAIN SCALES - GENERAL
PAINLEVEL: MODERATE PAIN (4)
PAINLEVEL: MODERATE PAIN (4)

## 2019-01-30 ASSESSMENT — MIFFLIN-ST. JEOR
SCORE: 1226.95
SCORE: 1227.27

## 2019-01-30 NOTE — TELEPHONE ENCOUNTER
Type of surgery: Left distal radius open reduction and internal fixation  Location of surgery: [facility]  Date and time of surgery: 02/01/19  Surgeon: Dr Andrews  Pre-Op Appt Date: 01/30/19 w/ Dr Kenney  Post-Op Appt Date: 02/14/19 w/Dr Andrews   Packet sent out: given to patient  Pre-cert/Authorization completed:  Yes  Date: 01/30/19

## 2019-01-30 NOTE — H&P (VIEW-ONLY)
[address]  [facility] [address]  [phone]  Dept: [phone]    PRE-OP EVALUATION:  Today's date: 2019    Love Kaiser (: 1950) presents for pre-operative evaluation assessment as requested by Dr. Andrews.  She requires evaluation and anesthesia risk assessment prior to undergoing surgery/procedure for treatment of Left wrist fracture.    Proposed Surgery/ Procedure: Left wrist  Date of Surgery/ Procedure: 19  Time of Surgery/ Procedure: n/a  Hospital/Surgical Facility: [facility]  Primary Physician: Gauri Kenney  Type of Anesthesia Anticipated: to be determined    Patient has a Health Care Directive or Living Will:  NO    1. NO - Do you have a history of heart attack, stroke, stent, bypass or surgery on an artery in the head, neck, heart or legs?  2. YES - DO YOU EVER HAVE ANY PAIN OR DISCOMFORT IN YOUR CHEST? Not recently  3. NO - Do you have a history of  Heart Failure?  4. NO - Are you troubled by shortness of breath when: walking on the level, up a slight hill or at night?  5. NO - Do you currently have a cold, bronchitis or other respiratory infection?  6. NO - Do you have a cough, shortness of breath or wheezing?  7. YES - DO YOU SOMETIMES GET PAINS IN THE CALVES OF YOUR LEGS WHEN YOU WALK? Cramps if she forgets to take potassium  8. NO - Do you or anyone in your family have previous history of blood clots?  9. NO - Do you or does anyone in your family have a serious bleeding problem such as prolonged bleeding following surgeries or cuts?  10. NO - Have you ever had problems with anemia or been told to take iron pills?  11. NO - Have you had any abnormal blood loss such as black, tarry or bloody stools, or abnormal vaginal bleeding?  12. NO - Have you ever had a blood transfusion?  13. NO - Have you or any of your relatives ever had problems with anesthesia?  14. NO - Do you have sleep apnea, excessive snoring or daytime  "drowsiness?  15. NO - Do you have any prosthetic heart valves?  16. YES - DO YOU HAVE PROSTHETIC JOINTS? Right shoulder replacement.   17. NO - Is there any chance that you may be pregnant?      HPI:     HPI related to upcoming procedure: Per Dr. Andrews's note earlier today:  \"She slipped on the ice and had a ground-level fall January 21, 2019.  Diagnosed with a left distal radius fracture at [facility] underwent closed reduction and splinting.  Denies any other injuries.  She has no pre-existing wrist issues.  No numbness and tingling.  She is been in a splint and using oxycodone for pain.\"    She will have Left wrist ORIF on Friday with Dr. Andrews. She was put in a short arm splint earlier today by Dr. Andrews.       See problem list for active medical problems.  Problems all longstanding and stable, except as noted/documented.  See ROS for pertinent symptoms related to these conditions.                                                                                                                                                           MEDICAL HISTORY:     Patient Active Problem List    Diagnosis Date Noted     Closed Colles' fracture of left radius, initial encounter 01/30/2019     Priority: Medium     Impingement syndrome, shoulder, left 08/31/2016     Priority: Medium     Prinzmetal angina (H) 08/25/2016     Priority: Medium     Hyperlipidemia LDL goal <70 08/25/2016     Priority: Medium     Macular degeneration 08/25/2016     Priority: Medium     S/P shoulder replacement 05/26/2015     Priority: Medium     Essential hypertension, benign 05/13/2015     Priority: Medium     Advanced directives, counseling/discussion 01/28/2015     Priority: Medium     Mailed information to ptAngel Whyte         Adenomatous polyp 05/22/2013     Priority: Medium     Hx of colonic polyp 05/10/2013     Priority: Medium     Health Care Home 12/12/2012     Priority: Medium     Shanelle Correa RN-PHN  [facility]   [phone]    DX V65.8 REPLACED WITH [facility]ALTH CARE HOME (04/08/2013)       Hyperlipidemia LDL goal <160 10/31/2010     Priority: Medium     Pneumonia 05/17/2010     Priority: Medium     Atopic rhinitis 05/17/2010     Priority: Medium     (Problem list name updated by automated process. Provider to review and confirm.)       COPD (chronic obstructive pulmonary disease) (H) 03/30/2009     Priority: Medium     Osteoporosis 06/08/2007     Priority: Medium     Problem list name updated by automated process. Provider to review       Acute gastritis 01/06/2007     Priority: Medium     Problem list name updated by automated process. Provider to review       Slow transit constipation 01/06/2007     Priority: Medium     Esophageal reflux 11/20/2006     Priority: Medium      Past Medical History:   Diagnosis Date     Acute alcoholic hepatitis      Acute pancreatitis 08/07/06    aDMIT. dISCHARGED 08/10/06     Cancer of parotid gland (H) 1974     Chronic airway obstruction, not elsewhere classified      COPD (chronic obstructive pulmonary disease) (H) 3/30/2009     Diarrhea      Hyposmolality and/or hyponatremia      Other and unspecified alcohol dependence, continuous drinking behavior      Other chronic nonalcoholic liver disease      Unspecified protein-calorie malnutrition (H)      Past Surgical History:   Procedure Laterality Date     ARTHROPLASTY SHOULDER Right 5/26/2015    Procedure: ARTHROPLASTY SHOULDER;  Surgeon: Ashutosh Adnrews DO;  Location: [facility] OR     C REMOVAL OF OVARY(S)       CL AFF SURGICAL PATHOLOGY  1974    parotid tumor with malignancy     COLONOSCOPY  09/21/09     COLONOSCOPY N/A 7/6/2016    Procedure: COMBINED COLONOSCOPY, SINGLE OR MULTIPLE BIOPSY/POLYPECTOMY BY BIOPSY;  Surgeon: John Bellamy MD;  Location: [facility] GI     EXCISE MASS FOOT  1/17/2012    Procedure:EXCISE MASS FOOT; Excision of Mass Right Foot; Surgeon:CARRIE PAGAN; Location:[facility] OR      FOREIGN BODY REMOVAL  07/08/10    Soft tissue mass w/peripheral metal fragments     HC COLONOSCOPY W BIOPSY  11/22/06     HC COLONOSCOPY W BIOPSY  10/31/07     HC UGI ENDOSCOPY DIAG W BIOPSY  11/22/06     HYSTERECTOMY, PAP NO LONGER INDICATED       HYSTERECTOMY, FIDE       Current Outpatient Medications   Medication Sig Dispense Refill     aspirin 81 MG tablet Take 1 tablet (81 mg) by mouth daily 90 tablet 3     Atorvastatin Calcium (LIPITOR PO) Take 40 mg by mouth daily       CARTIA  MG 24 hr capsule Take 180 mg by mouth daily  8     docusate sodium (COLACE) 100 MG capsule Take 100 mg by mouth daily       NITROGLYCERIN SL Place 0.4 mg under the tongue Reported on 5/17/2017       oxyCODONE-acetaminophen (PERCOCET) 5-325 MG tablet Take 1 tablet by mouth every 6 hours as needed for severe pain       POTASSIUM 75 MG OR TABS 1 TABLET  DAILY       PROAIR  (90 BASE) MCG/ACT inhaler INHALE TWO PUFFS BY MOUTH EVERY FOUR HOURS AS NEEDED FOR SHORTNESS OF BREATH OR DYSPNEA 8.5 g 5     VITAMIN B-12 500 MCG OR TABS daily       VITAMIN C 500 MG OR TABS ONE TABLET DAILY 90 Tab 3     VITAMIN D-3 SUPER STRENGTH 2000 UNIT OR TABS daily       OTC products: None, except as noted above    Allergies   Allergen Reactions     Nickel Itching     No Known Drug Allergies       Latex Allergy: NO    Social History     Tobacco Use     Smoking status: Current Every Day Smoker     Packs/day: 0.50     Years: 29.00     Pack years: 14.50     Types: Cigarettes     Smokeless tobacco: Never Used   Substance Use Topics     Alcohol use: No     Alcohol/week: 14.0 oz     Comment: quit drinking     History   Drug Use No       REVIEW OF SYSTEMS:   - Constipation has been an ongoing problem for her. She takes Colace and tries to control this with diet.     Constitutional, HEENT, cardiovascular, pulmonary, GI, , musculoskeletal, neuro, skin, endocrine and psych systems are negative, except as in HPI or otherwise noted.     This document serves as  "a record of the services and decisions personally performed and made by Gauri Kenney MD. It was created on her behalf by Leslie Maurer, a trained medical scribe. The creation of this document is based the provider's statements to the medical scribe.  Leslie Maurer, January 30, 2019 4:16 PM     EXAM:   Ht 1.588 m (5' 2.52\")   Wt 72.6 kg (160 lb)   BMI 28.78 kg/m      GENERAL APPEARANCE: healthy, alert and no distress     EYES: EOMI, PERRL     HENT: ear canals and TM's normal and nose and mouth without ulcers or lesions     NECK: no adenopathy, no asymmetry, masses, or scars and thyroid normal to palpation     RESP: lungs clear to auscultation - no rales, rhonchi or wheezes     CV: regular rates and rhythm, normal S1 S2, no S3 or S4 and no murmur, click or rub     ABDOMEN:  soft, nontender, no HSM or masses and bowel sounds normal     MS: left forearm and wrist are in a short arm splint     SKIN: no suspicious lesions or rashes     NEURO: Normal strength and tone, sensory exam grossly normal, mentation intact and speech normal     PSYCH: mentation appears normal. and affect normal/bright     LYMPHATICS: No cervical adenopathy    DIAGNOSTICS:     EKG: appears normal, NSR, normal axis, normal intervals, no acute ST/T changes c/w ischemia, no LVH by voltage criteria, unchanged from previous tracings    Labs Resulted Today:   Results for orders placed or performed in visit on 01/30/19   XR Wrist Left G/E 3 Views    Narrative    WRIST LEFT THREE OR MORE VIEWS    1/30/2019 2:47 PM     HISTORY: Left wrist pain    COMPARISON: None.        Impression    IMPRESSION:  There is a comminuted distal radial metaphyseal and  possibly epiphyseal fracture with dorsal angulation of the distal  fragments. There is a mildly displaced ulnar styloid process fracture.  Degenerative changes are noted in the wrist with moderate to severe  STT and first carpometacarpal joint space loss and subchondral  eburnation. There is mild osteopenia. Splint " material obscures fine  bony detail.       Recent Labs   Lab Test 12/04/18  0931 08/24/16  1140  05/27/15  0540 05/13/15  1042   HGB  --  14.4  --  11.4* 14.8   PLT  --  273  --   --  262   INR  --  0.93  --   --   --     142   < >  --   --    POTASSIUM 4.3 4.1   < >  --   --    CR 0.77 0.89   < >  --   --     < > = values in this interval not displayed.     IMPRESSION:   Reason for surgery/procedure: Left wrist fracture  Diagnosis/reason for consult: Pre-op evaluation of fitness for surgery & proposed anesthesia    The proposed surgical procedure is considered INTERMEDIATE risk.    REVISED CARDIAC RISK INDEX  The patient has the following serious cardiovascular risks for perioperative complications such as (MI, PE, VFib and 3  AV Block):  No serious cardiac risks  INTERPRETATION: 0 risks: Class I (very low risk - 0.4% complication rate)    The patient has the following additional risks for perioperative complications:  No identified additional risks      ICD-10-CM    1. Preop general physical exam Z01.818      Smoking: Advised pt on benefits of smoking cessation and discussed potential use of counseling and pharmacotherapies. She would like to try the Wellbutrin to help her quit smoking.     RECOMMENDATIONS:     --Patient is to take all scheduled medications on the day of surgery EXCEPT for modifications listed below.    APPROVAL GIVEN to proceed with proposed procedure, without further diagnostic evaluation     The information in this document, created by the medical scribe for me, accurately reflects the services I personally performed and the decisions made by me. I have reviewed and approved this document for accuracy.     Signed Electronically by: Gauri Kenney MD, MD    Copy of this evaluation report is provided to requesting physician.    [facility] Preop Guidelines    Revised Cardiac Risk Index

## 2019-01-30 NOTE — LETTER
1/30/2019         RE: Love Kaiser  [address] [facility] [address]        Dear Colleague,    Thank you for referring your patient, Love Kaiser, to the [facility]. Please see a copy of my visit note below.    ORTHOPEDIC CONSULT      Chief Complaint: Love Kaiser is a 68 year old female who is being seen for Chief Complaint   Patient presents with     Musculoskeletal Problem     left wrist injurt- DOI: 1/21/2019     Consult     [facility]       History of Present Illness:   She slipped on the ice and had a ground-level fall January 21, 2019.  Diagnosed with a left distal radius fracture at [facility] underwent closed reduction and splinting.  Denies any other injuries.  She has no pre-existing wrist issues.  No numbness and tingling.  She is been in a splint and using oxycodone for pain.      Patient's past medical, surgical, social and family histories reviewed.     Past Medical History:   Diagnosis Date     Acute alcoholic hepatitis      Acute pancreatitis 08/07/06    aDMIT. dISCHARGED 08/10/06     Cancer of parotid gland (H) 1974     Chronic airway obstruction, not elsewhere classified      COPD (chronic obstructive pulmonary disease) (H) 3/30/2009     Diarrhea      Hyposmolality and/or hyponatremia      Other and unspecified alcohol dependence, continuous drinking behavior      Other chronic nonalcoholic liver disease      Unspecified protein-calorie malnutrition (H)        Past Surgical History:   Procedure Laterality Date     ARTHROPLASTY SHOULDER Right 5/26/2015    Procedure: ARTHROPLASTY SHOULDER;  Surgeon: Ashutosh Andrews DO;  Location:  OR     C REMOVAL OF OVARY(S)       CL AFF SURGICAL PATHOLOGY  1974    parotid tumor with malignancy     COLONOSCOPY  09/21/09     COLONOSCOPY N/A 7/6/2016    Procedure: COMBINED COLONOSCOPY, SINGLE OR MULTIPLE BIOPSY/POLYPECTOMY BY BIOPSY;  Surgeon: John Bellamy MD;  Location:  GI     EXCISE MASS FOOT   1/17/2012    Procedure:EXCISE MASS FOOT; Excision of Mass Right Foot; Surgeon:CARRIE PAGAN; Location:[facility] OR     FOREIGN BODY REMOVAL  07/08/10    Soft tissue mass w/peripheral metal fragments     HC COLONOSCOPY W BIOPSY  11/22/06     HC COLONOSCOPY W BIOPSY  10/31/07     HC UGI ENDOSCOPY DIAG W BIOPSY  11/22/06     HYSTERECTOMY, PAP NO LONGER INDICATED       HYSTERECTOMY, FIDE         Medications:    Current Outpatient Medications on File Prior to Visit:  aspirin 81 MG tablet Take 1 tablet (81 mg) by mouth daily   Atorvastatin Calcium (LIPITOR PO) Take 40 mg by mouth daily   CARTIA  MG 24 hr capsule Take 180 mg by mouth daily   docusate sodium (COLACE) 100 MG capsule Take 100 mg by mouth daily   oxyCODONE-acetaminophen (PERCOCET) 5-325 MG tablet Take 1 tablet by mouth every 6 hours as needed for severe pain   POTASSIUM 75 MG OR TABS 1 TABLET  DAILY   PROAIR  (90 BASE) MCG/ACT inhaler INHALE TWO PUFFS BY MOUTH EVERY FOUR HOURS AS NEEDED FOR SHORTNESS OF BREATH OR DYSPNEA   VITAMIN B-12 500 MCG OR TABS daily   VITAMIN C 500 MG OR TABS ONE TABLET DAILY   VITAMIN D-3 SUPER STRENGTH 2000 UNIT OR TABS daily   NITROGLYCERIN SL Place 0.4 mg under the tongue Reported on 5/17/2017     No current facility-administered medications on file prior to visit.     Allergies   Allergen Reactions     Nickel Itching     No Known Drug Allergies        Social History     Occupational History     Occupation: unemployed   Tobacco Use     Smoking status: Current Every Day Smoker     Packs/day: 0.50     Years: 29.00     Pack years: 14.50     Types: Cigarettes     Smokeless tobacco: Never Used   Substance and Sexual Activity     Alcohol use: No     Alcohol/week: 14.0 oz     Comment: quit drinking     Drug use: No     Sexual activity: Yes     Partners: Male     Birth control/protection: None     Comment: not currently       Family History   Problem Relation Age of Onset     Arthritis Mother         RA     Cancer Mother      "    female organs     Cancer Father         colon       REVIEW OF SYSTEMS  10 point review systems performed otherwise negative as noted as per history of present illness.    Physical Exam:  Vitals: Ht 1.588 m (5' 2.5\")   Wt 72.6 kg (160 lb)   BMI 28.80 kg/m     BMI= Body mass index is 28.8 kg/m .  Constitutional: healthy, alert and no acute distress   Psychiatric: mentation appears normal and affect normal/bright  NEURO: no focal deficits  RESP: Normal with easy respirations and no use of accessory muscles to breathe, no audible wheezing or retractions  CV: LUE:   hand only-  non-pitting edema         Regular rate and rhythm by palpation  SKIN: No erythema, rashes, excoriation, or breakdown. No evidence of infection. ,  Ecchymosis along the forearm into the antecubital fossa and upper arm.  JOINT/EXTREMITIES:left arm: Motion not tested to the wrist.  Ecchymosis as noted.  Tenderness along the distal radius as well as ulnar styloid.  There is no carpal bone tenderness.  There is no hand or phalangeal tenderness.  She has no elbow pain with palpation.  Although there is some ecchymosis extending up into the antecubital fossa and anterior arm.  Fingers are warm and dry with good cap refill.  Sensation is intact to light touch.  Full motor is somewhat limited given her injury although it appears distally neurovascular intact.    GAIT: not tested     Diagnostic Modalities:  left wrist X-ray: Comminuted displaced distal radius and small ulnar styloid fracture.  Distal radius fracture shows approximate 25 degrees apex volar angulation with loss of radial height and inclination  Independent visualization of the images was performed.      Impression: left displaced distal radius fracture with small ulnar styloid fracture    Plan:  All of the above pertinent physical exam and imaging modalities findings was reviewed with Love and her friend Love.    I discussed her options.  Based on her high activity level and " current deformity with subsequent likelihood of losing wrist range of motion we discussed left distal radius open reduction and internal fixation.  I discussed the risks, benefits, complications.  Risk including bleeding, infection, scar, nerve injury, numbness discussed.  Postoperative timeframe for recovery reviewed.  Once discussed she is opted to proceed.    Anticipate general anesthesia as an outpatient surgery.  She will need history and physical prior to surgery.    Return to clinic 10 days post-operatively. , or sooner as needed for changes.  Re-x-ray on return: Yes.  Out of splint    Maximino Andrews D.O.    Again, thank you for allowing me to participate in the care of your patient.        Sincerely,        Ashutosh Andrews, DO

## 2019-01-30 NOTE — PROGRESS NOTES
[address]  [facility] [address]  [phone]  Dept: [phone]    PRE-OP EVALUATION:  Today's date: 2019    Love Kaiser (: 1950) presents for pre-operative evaluation assessment as requested by Dr. Andrews.  She requires evaluation and anesthesia risk assessment prior to undergoing surgery/procedure for treatment of Left wrist fracture.    Proposed Surgery/ Procedure: Left wrist  Date of Surgery/ Procedure: 19  Time of Surgery/ Procedure: n/a  Hospital/Surgical Facility: [facility]  Primary Physician: Gauri Kenney  Type of Anesthesia Anticipated: to be determined    Patient has a Health Care Directive or Living Will:  NO    1. NO - Do you have a history of heart attack, stroke, stent, bypass or surgery on an artery in the head, neck, heart or legs?  2. YES - DO YOU EVER HAVE ANY PAIN OR DISCOMFORT IN YOUR CHEST? Not recently  3. NO - Do you have a history of  Heart Failure?  4. NO - Are you troubled by shortness of breath when: walking on the level, up a slight hill or at night?  5. NO - Do you currently have a cold, bronchitis or other respiratory infection?  6. NO - Do you have a cough, shortness of breath or wheezing?  7. YES - DO YOU SOMETIMES GET PAINS IN THE CALVES OF YOUR LEGS WHEN YOU WALK? Cramps if she forgets to take potassium  8. NO - Do you or anyone in your family have previous history of blood clots?  9. NO - Do you or does anyone in your family have a serious bleeding problem such as prolonged bleeding following surgeries or cuts?  10. NO - Have you ever had problems with anemia or been told to take iron pills?  11. NO - Have you had any abnormal blood loss such as black, tarry or bloody stools, or abnormal vaginal bleeding?  12. NO - Have you ever had a blood transfusion?  13. NO - Have you or any of your relatives ever had problems with anesthesia?  14. NO - Do you have sleep apnea, excessive snoring or daytime  "drowsiness?  15. NO - Do you have any prosthetic heart valves?  16. YES - DO YOU HAVE PROSTHETIC JOINTS? Right shoulder replacement.   17. NO - Is there any chance that you may be pregnant?      HPI:     HPI related to upcoming procedure: Per Dr. Andrews's note earlier today:  \"She slipped on the ice and had a ground-level fall January 21, 2019.  Diagnosed with a left distal radius fracture at [facility] underwent closed reduction and splinting.  Denies any other injuries.  She has no pre-existing wrist issues.  No numbness and tingling.  She is been in a splint and using oxycodone for pain.\"    She will have Left wrist ORIF on Friday with Dr. Andrews. She was put in a short arm splint earlier today by Dr. Andrews.       See problem list for active medical problems.  Problems all longstanding and stable, except as noted/documented.  See ROS for pertinent symptoms related to these conditions.                                                                                                                                                           MEDICAL HISTORY:     Patient Active Problem List    Diagnosis Date Noted     Closed Colles' fracture of left radius, initial encounter 01/30/2019     Priority: Medium     Impingement syndrome, shoulder, left 08/31/2016     Priority: Medium     Prinzmetal angina (H) 08/25/2016     Priority: Medium     Hyperlipidemia LDL goal <70 08/25/2016     Priority: Medium     Macular degeneration 08/25/2016     Priority: Medium     S/P shoulder replacement 05/26/2015     Priority: Medium     Essential hypertension, benign 05/13/2015     Priority: Medium     Advanced directives, counseling/discussion 01/28/2015     Priority: Medium     Mailed information to ptAngel Whyte         Adenomatous polyp 05/22/2013     Priority: Medium     Hx of colonic polyp 05/10/2013     Priority: Medium     Health Care Home 12/12/2012     Priority: Medium     Shanelle Correa RN-PHN  [facility]   [phone]    DX V65.8 REPLACED WITH [id number]ALTH CARE [facility] (04/08/2013)       Hyperlipidemia LDL goal <160 10/31/2010     Priority: Medium     Pneumonia 05/17/2010     Priority: Medium     Atopic rhinitis 05/17/2010     Priority: Medium     (Problem list name updated by automated process. Provider to review and confirm.)       COPD (chronic obstructive pulmonary disease) (H) 03/30/2009     Priority: Medium     Osteoporosis 06/08/2007     Priority: Medium     Problem list name updated by automated process. Provider to review       Acute gastritis 01/06/2007     Priority: Medium     Problem list name updated by automated process. Provider to review       Slow transit constipation 01/06/2007     Priority: Medium     Esophageal reflux 11/20/2006     Priority: Medium      Past Medical History:   Diagnosis Date     Acute alcoholic hepatitis      Acute pancreatitis 08/07/06    aDMIT. dISCHARGED 08/10/06     Cancer of parotid gland (H) 1974     Chronic airway obstruction, not elsewhere classified      COPD (chronic obstructive pulmonary disease) (H) 3/30/2009     Diarrhea      Hyposmolality and/or hyponatremia      Other and unspecified alcohol dependence, continuous drinking behavior      Other chronic nonalcoholic liver disease      Unspecified protein-calorie malnutrition (H)      Past Surgical History:   Procedure Laterality Date     ARTHROPLASTY SHOULDER Right 5/26/2015    Procedure: ARTHROPLASTY SHOULDER;  Surgeon: Ashutosh Andrews DO;  Location: [facility]     C REMOVAL OF OVARY(S)       CL AFF SURGICAL PATHOLOGY  1974    parotid tumor with malignancy     COLONOSCOPY  09/21/09     COLONOSCOPY N/A 7/6/2016    Procedure: COMBINED COLONOSCOPY, SINGLE OR MULTIPLE BIOPSY/POLYPECTOMY BY BIOPSY;  Surgeon: John Bellamy MD;  Location: [facility] GI     EXCISE MASS FOOT  1/17/2012    Procedure:EXCISE MASS FOOT; Excision of Mass Right Foot; Surgeon:CARRIE PAGAN; Location:[facility] OR      FOREIGN BODY REMOVAL  07/08/10    Soft tissue mass w/peripheral metal fragments     HC COLONOSCOPY W BIOPSY  11/22/06     HC COLONOSCOPY W BIOPSY  10/31/07     HC UGI ENDOSCOPY DIAG W BIOPSY  11/22/06     HYSTERECTOMY, PAP NO LONGER INDICATED       HYSTERECTOMY, FIDE       Current Outpatient Medications   Medication Sig Dispense Refill     aspirin 81 MG tablet Take 1 tablet (81 mg) by mouth daily 90 tablet 3     Atorvastatin Calcium (LIPITOR PO) Take 40 mg by mouth daily       CARTIA  MG 24 hr capsule Take 180 mg by mouth daily  8     docusate sodium (COLACE) 100 MG capsule Take 100 mg by mouth daily       NITROGLYCERIN SL Place 0.4 mg under the tongue Reported on 5/17/2017       oxyCODONE-acetaminophen (PERCOCET) 5-325 MG tablet Take 1 tablet by mouth every 6 hours as needed for severe pain       POTASSIUM 75 MG OR TABS 1 TABLET  DAILY       PROAIR  (90 BASE) MCG/ACT inhaler INHALE TWO PUFFS BY MOUTH EVERY FOUR HOURS AS NEEDED FOR SHORTNESS OF BREATH OR DYSPNEA 8.5 g 5     VITAMIN B-12 500 MCG OR TABS daily       VITAMIN C 500 MG OR TABS ONE TABLET DAILY 90 Tab 3     VITAMIN D-3 SUPER STRENGTH 2000 UNIT OR TABS daily       OTC products: None, except as noted above    Allergies   Allergen Reactions     Nickel Itching     No Known Drug Allergies       Latex Allergy: NO    Social History     Tobacco Use     Smoking status: Current Every Day Smoker     Packs/day: 0.50     Years: 29.00     Pack years: 14.50     Types: Cigarettes     Smokeless tobacco: Never Used   Substance Use Topics     Alcohol use: No     Alcohol/week: 14.0 oz     Comment: quit drinking     History   Drug Use No       REVIEW OF SYSTEMS:   - Constipation has been an ongoing problem for her. She takes Colace and tries to control this with diet.     Constitutional, HEENT, cardiovascular, pulmonary, GI, , musculoskeletal, neuro, skin, endocrine and psych systems are negative, except as in HPI or otherwise noted.     This document serves as  "a record of the services and decisions personally performed and made by Gauri Kenney MD. It was created on her behalf by Leslie Maurer, a trained medical scribe. The creation of this document is based the provider's statements to the medical scribe.  Leslie Maurer, January 30, 2019 4:16 PM     EXAM:   Ht 1.588 m (5' 2.52\")   Wt 72.6 kg (160 lb)   BMI 28.78 kg/m      GENERAL APPEARANCE: healthy, alert and no distress     EYES: EOMI, PERRL     HENT: ear canals and TM's normal and nose and mouth without ulcers or lesions     NECK: no adenopathy, no asymmetry, masses, or scars and thyroid normal to palpation     RESP: lungs clear to auscultation - no rales, rhonchi or wheezes     CV: regular rates and rhythm, normal S1 S2, no S3 or S4 and no murmur, click or rub     ABDOMEN:  soft, nontender, no HSM or masses and bowel sounds normal     MS: left forearm and wrist are in a short arm splint     SKIN: no suspicious lesions or rashes     NEURO: Normal strength and tone, sensory exam grossly normal, mentation intact and speech normal     PSYCH: mentation appears normal. and affect normal/bright     LYMPHATICS: No cervical adenopathy    DIAGNOSTICS:     EKG: appears normal, NSR, normal axis, normal intervals, no acute ST/T changes c/w ischemia, no LVH by voltage criteria, unchanged from previous tracings    Labs Resulted Today:   Results for orders placed or performed in visit on 01/30/19   XR Wrist Left G/E 3 Views    Narrative    WRIST LEFT THREE OR MORE VIEWS    1/30/2019 2:47 PM     HISTORY: Left wrist pain    COMPARISON: None.        Impression    IMPRESSION:  There is a comminuted distal radial metaphyseal and  possibly epiphyseal fracture with dorsal angulation of the distal  fragments. There is a mildly displaced ulnar styloid process fracture.  Degenerative changes are noted in the wrist with moderate to severe  STT and first carpometacarpal joint space loss and subchondral  eburnation. There is mild osteopenia. Splint " material obscures fine  bony detail.       Recent Labs   Lab Test 12/04/18  0931 08/24/16  1140  05/27/15  0540 05/13/15  1042   HGB  --  14.4  --  11.4* 14.8   PLT  --  273  --   --  262   INR  --  0.93  --   --   --     142   < >  --   --    POTASSIUM 4.3 4.1   < >  --   --    CR 0.77 0.89   < >  --   --     < > = values in this interval not displayed.     IMPRESSION:   Reason for surgery/procedure: Left wrist fracture  Diagnosis/reason for consult: Pre-op evaluation of fitness for surgery & proposed anesthesia    The proposed surgical procedure is considered INTERMEDIATE risk.    REVISED CARDIAC RISK INDEX  The patient has the following serious cardiovascular risks for perioperative complications such as (MI, PE, VFib and 3  AV Block):  No serious cardiac risks  INTERPRETATION: 0 risks: Class I (very low risk - 0.4% complication rate)    The patient has the following additional risks for perioperative complications:  No identified additional risks      ICD-10-CM    1. Preop general physical exam Z01.818      Smoking: Advised pt on benefits of smoking cessation and discussed potential use of counseling and pharmacotherapies. She would like to try the Wellbutrin to help her quit smoking.     RECOMMENDATIONS:     --Patient is to take all scheduled medications on the day of surgery EXCEPT for modifications listed below.    APPROVAL GIVEN to proceed with proposed procedure, without further diagnostic evaluation     The information in this document, created by the medical scribe for me, accurately reflects the services I personally performed and the decisions made by me. I have reviewed and approved this document for accuracy.     Signed Electronically by: Gauri Kenney MD, MD    Copy of this evaluation report is provided to requesting physician.    [facility] Preop Guidelines    Revised Cardiac Risk Index

## 2019-01-30 NOTE — PROGRESS NOTES
ORTHOPEDIC CONSULT      Chief Complaint: Love Kaiser is a 68 year old female who is being seen for Chief Complaint   Patient presents with     Musculoskeletal Problem     left wrist injurt- DOI: 1/21/2019     Consult     [facility] ER       History of Present Illness:   She slipped on the ice and had a ground-level fall January 21, 2019.  Diagnosed with a left distal radius fracture at [facility] underwent closed reduction and splinting.  Denies any other injuries.  She has no pre-existing wrist issues.  No numbness and tingling.  She is been in a splint and using oxycodone for pain.      Patient's past medical, surgical, social and family histories reviewed.     Past Medical History:   Diagnosis Date     Acute alcoholic hepatitis      Acute pancreatitis 08/07/06    aDMIT. dISCHARGED 08/10/06     Cancer of parotid gland (H) 1974     Chronic airway obstruction, not elsewhere classified      COPD (chronic obstructive pulmonary disease) (H) 3/30/2009     Diarrhea      Hyposmolality and/or hyponatremia      Other and unspecified alcohol dependence, continuous drinking behavior      Other chronic nonalcoholic liver disease      Unspecified protein-calorie malnutrition (H)        Past Surgical History:   Procedure Laterality Date     ARTHROPLASTY SHOULDER Right 5/26/2015    Procedure: ARTHROPLASTY SHOULDER;  Surgeon: Ashutosh Andrews DO;  Location: [facility]     C REMOVAL OF OVARY(S)       CL AFF SURGICAL PATHOLOGY  1974    parotid tumor with malignancy     COLONOSCOPY  09/21/09     COLONOSCOPY N/A 7/6/2016    Procedure: COMBINED COLONOSCOPY, SINGLE OR MULTIPLE BIOPSY/POLYPECTOMY BY BIOPSY;  Surgeon: John Bellamy MD;  [facility]: [facility]     EXCISE MASS FOOT  1/17/2012    Procedure:EXCISE MASS FOOT; Excision of Mass Right Foot; Surgeon:CARRIE PAGAN; Location:[facility] OR     FOREIGN BODY REMOVAL  07/08/10    Soft tissue mass w/peripheral metal fragments     HC COLONOSCOPY W BIOPSY  11/22/06     HC  "COLONOSCOPY W BIOPSY  10/31/07      UGI ENDOSCOPY DIAG W BIOPSY  11/22/06     HYSTERECTOMY, PAP NO LONGER INDICATED       HYSTERECTOMY, FIDE         Medications:    Current Outpatient Medications on File Prior to Visit:  aspirin 81 MG tablet Take 1 tablet (81 mg) by mouth daily   Atorvastatin Calcium (LIPITOR PO) Take 40 mg by mouth daily   CARTIA  MG 24 hr capsule Take 180 mg by mouth daily   docusate sodium (COLACE) 100 MG capsule Take 100 mg by mouth daily   oxyCODONE-acetaminophen (PERCOCET) 5-325 MG tablet Take 1 tablet by mouth every 6 hours as needed for severe pain   POTASSIUM 75 MG OR TABS 1 TABLET  DAILY   PROAIR  (90 BASE) MCG/ACT inhaler INHALE TWO PUFFS BY MOUTH EVERY FOUR HOURS AS NEEDED FOR SHORTNESS OF BREATH OR DYSPNEA   VITAMIN B-12 500 MCG OR TABS daily   VITAMIN C 500 MG OR TABS ONE TABLET DAILY   VITAMIN D-3 SUPER STRENGTH 2000 UNIT OR TABS daily   NITROGLYCERIN SL Place 0.4 mg under the tongue Reported on 5/17/2017     No current facility-administered medications on file prior to visit.     Allergies   Allergen Reactions     Nickel Itching     No Known Drug Allergies        Social History     Occupational History     Occupation: unemployed   Tobacco Use     Smoking status: Current Every Day Smoker     Packs/day: 0.50     Years: 29.00     Pack years: 14.50     Types: Cigarettes     Smokeless tobacco: Never Used   Substance and Sexual Activity     Alcohol use: No     Alcohol/week: 14.0 oz     Comment: quit drinking     Drug use: No     Sexual activity: Yes     Partners: Male     Birth control/protection: None     Comment: not currently       Family History   Problem Relation Age of Onset     Arthritis Mother         RA     Cancer Mother         female organs     Cancer Father         colon       REVIEW OF SYSTEMS  10 point review systems performed otherwise negative as noted as per history of present illness.    Physical Exam:  Vitals: Ht 1.588 m (5' 2.5\")   Wt 72.6 kg (160 lb)   " BMI 28.80 kg/m    BMI= Body mass index is 28.8 kg/m .  Constitutional: healthy, alert and no acute distress   Psychiatric: mentation appears normal and affect normal/bright  NEURO: no focal deficits  RESP: Normal with easy respirations and no use of accessory muscles to breathe, no audible wheezing or retractions  CV: LUE:   hand only-  non-pitting edema         Regular rate and rhythm by palpation  SKIN: No erythema, rashes, excoriation, or breakdown. No evidence of infection. ,  Ecchymosis along the forearm into the antecubital fossa and upper arm.  JOINT/EXTREMITIES:left arm: Motion not tested to the wrist.  Ecchymosis as noted.  Tenderness along the distal radius as well as ulnar styloid.  There is no carpal bone tenderness.  There is no hand or phalangeal tenderness.  She has no elbow pain with palpation.  Although there is some ecchymosis extending up into the antecubital fossa and anterior arm.  Fingers are warm and dry with good cap refill.  Sensation is intact to light touch.  Full motor is somewhat limited given her injury although it appears distally neurovascular intact.    GAIT: not tested     Diagnostic Modalities:  left wrist X-ray: Comminuted displaced distal radius and small ulnar styloid fracture.  Distal radius fracture shows approximate 25 degrees apex volar angulation with loss of radial height and inclination  Independent visualization of the images was performed.      Impression: left displaced distal radius fracture with small ulnar styloid fracture    Plan:  All of the above pertinent physical exam and imaging modalities findings was reviewed with Love and her friend Love.    I discussed her options.  Based on her high activity level and current deformity with subsequent likelihood of losing wrist range of motion we discussed left distal radius open reduction and internal fixation.  I discussed the risks, benefits, complications.  Risk including bleeding, infection, scar, nerve injury,  numbness discussed.  Postoperative timeframe for recovery reviewed.  Once discussed she is opted to proceed.    On today's visit a well padded Ortho-Glass  short arm splint was applied to the left upper extremity. The neurovascular status is unchanged after application. Cast/splint care was discussed.    Anticipate general anesthesia as an outpatient surgery.  She will need history and physical prior to surgery.    Return to clinic 10 days post-operatively. , or sooner as needed for changes.  Re-x-ray on return: Yes.  Out of splint    Maximino Andrews D.O.

## 2019-02-01 ENCOUNTER — HOSPITAL ENCOUNTER (OUTPATIENT)
Dept: GENERAL RADIOLOGY | Facility: CLINIC | Age: 69
End: 2019-02-01
Attending: ORTHOPAEDIC SURGERY | Admitting: ORTHOPAEDIC SURGERY
Payer: COMMERCIAL

## 2019-02-01 ENCOUNTER — ANESTHESIA (OUTPATIENT)
Dept: SURGERY | Facility: CLINIC | Age: 69
End: 2019-02-01
Payer: COMMERCIAL

## 2019-02-01 ENCOUNTER — ANESTHESIA EVENT (OUTPATIENT)
Dept: SURGERY | Facility: CLINIC | Age: 69
End: 2019-02-01
Payer: COMMERCIAL

## 2019-02-01 ENCOUNTER — HOSPITAL ENCOUNTER (OUTPATIENT)
Facility: CLINIC | Age: 69
Discharge: HOME | End: 2019-02-01
Attending: ORTHOPAEDIC SURGERY | Admitting: ORTHOPAEDIC SURGERY
Payer: COMMERCIAL

## 2019-02-01 VITALS
SYSTOLIC BLOOD PRESSURE: 147 MMHG | RESPIRATION RATE: 15 BRPM | OXYGEN SATURATION: 93 % | TEMPERATURE: 98.3 F | DIASTOLIC BLOOD PRESSURE: 75 MMHG | HEART RATE: 79 BPM

## 2019-02-01 DIAGNOSIS — S52.532A CLOSED COLLES' FRACTURE OF LEFT RADIUS, INITIAL ENCOUNTER: Primary | ICD-10-CM

## 2019-02-01 DIAGNOSIS — S52.502A CLOSED FRACTURE OF LEFT DISTAL RADIUS: ICD-10-CM

## 2019-02-01 PROCEDURE — 27210794 ZZH OR GENERAL SUPPLY STERILE: Performed by: ORTHOPAEDIC SURGERY

## 2019-02-01 PROCEDURE — 37000008 ZZH ANESTHESIA TECHNICAL FEE, 1ST 30 MIN: Performed by: ORTHOPAEDIC SURGERY

## 2019-02-01 PROCEDURE — 25607 OPTX DST RD XARTC FX/EPI SEP: CPT | Mod: AS | Performed by: NURSE PRACTITIONER

## 2019-02-01 PROCEDURE — 40000306 ZZH STATISTIC PRE PROC ASSESS II: Performed by: ORTHOPAEDIC SURGERY

## 2019-02-01 PROCEDURE — 25000128 H RX IP 250 OP 636: Performed by: NURSE ANESTHETIST, CERTIFIED REGISTERED

## 2019-02-01 PROCEDURE — 71000027 ZZH RECOVERY PHASE 2 EACH 15 MINS: Performed by: ORTHOPAEDIC SURGERY

## 2019-02-01 PROCEDURE — 25607 OPTX DST RD XARTC FX/EPI SEP: CPT | Mod: LT | Performed by: ORTHOPAEDIC SURGERY

## 2019-02-01 PROCEDURE — 25000125 ZZHC RX 250: Performed by: NURSE ANESTHETIST, CERTIFIED REGISTERED

## 2019-02-01 PROCEDURE — 25000128 H RX IP 250 OP 636: Performed by: ORTHOPAEDIC SURGERY

## 2019-02-01 PROCEDURE — 36000060 ZZH SURGERY LEVEL 3 W FLUORO 1ST 30 MIN: Performed by: ORTHOPAEDIC SURGERY

## 2019-02-01 PROCEDURE — 25000566 ZZH SEVOFLURANE, EA 15 MIN: Performed by: ORTHOPAEDIC SURGERY

## 2019-02-01 PROCEDURE — 40000277 XR SURGERY CARM FLUORO LESS THAN 5 MIN W STILLS

## 2019-02-01 PROCEDURE — 37000009 ZZH ANESTHESIA TECHNICAL FEE, EACH ADDTL 15 MIN: Performed by: ORTHOPAEDIC SURGERY

## 2019-02-01 PROCEDURE — 71000014 ZZH RECOVERY PHASE 1 LEVEL 2 FIRST HR: Performed by: ORTHOPAEDIC SURGERY

## 2019-02-01 PROCEDURE — 36000058 ZZH SURGERY LEVEL 3 EA 15 ADDTL MIN: Performed by: ORTHOPAEDIC SURGERY

## 2019-02-01 PROCEDURE — C1713 ANCHOR/SCREW BN/BN,TIS/BN: HCPCS | Performed by: ORTHOPAEDIC SURGERY

## 2019-02-01 DEVICE — IMPLANTABLE DEVICE: Type: IMPLANTABLE DEVICE | Site: WRIST | Status: FUNCTIONAL

## 2019-02-01 DEVICE — IMP PEG HANDINN SUBCHONDRAL 2.0X20MM P20000: Type: IMPLANTABLE DEVICE | Site: WRIST | Status: FUNCTIONAL

## 2019-02-01 DEVICE — IMP PEG HANDINN SUBCHONDRAL 2.0X18MM P18000: Type: IMPLANTABLE DEVICE | Site: WRIST | Status: FUNCTIONAL

## 2019-02-01 DEVICE — IMP PEG HANDINN SUBCHONDRAL 2.0X22MM P22000: Type: IMPLANTABLE DEVICE | Site: WRIST | Status: FUNCTIONAL

## 2019-02-01 DEVICE — IMP SCR CORTICAL HANDINN 3.5X12MM CS12000: Type: IMPLANTABLE DEVICE | Site: WRIST | Status: FUNCTIONAL

## 2019-02-01 DEVICE — IMP SCR CORTICAL HANDINN 3.5X14MM CS14000: Type: IMPLANTABLE DEVICE | Site: WRIST | Status: FUNCTIONAL

## 2019-02-01 DEVICE — IMP PEG HANDINN SUBCHONDRAL 2.0X24MM P24000: Type: IMPLANTABLE DEVICE | Site: WRIST | Status: FUNCTIONAL

## 2019-02-01 RX ORDER — ACETAMINOPHEN 325 MG/1
975 TABLET ORAL EVERY 8 HOURS PRN
Qty: 50 TABLET | Refills: 1 | Status: SHIPPED | OUTPATIENT
Start: 2019-02-01 | End: 2019-07-17

## 2019-02-01 RX ORDER — HYDROMORPHONE HYDROCHLORIDE 1 MG/ML
.3-.5 INJECTION, SOLUTION INTRAMUSCULAR; INTRAVENOUS; SUBCUTANEOUS EVERY 10 MIN PRN
Status: DISCONTINUED | OUTPATIENT
Start: 2019-02-01 | End: 2019-02-01 | Stop reason: HOSPADM

## 2019-02-01 RX ORDER — FENTANYL CITRATE 50 UG/ML
25-50 INJECTION, SOLUTION INTRAMUSCULAR; INTRAVENOUS
Status: DISCONTINUED | OUTPATIENT
Start: 2019-02-01 | End: 2019-02-01 | Stop reason: HOSPADM

## 2019-02-01 RX ORDER — OXYCODONE HYDROCHLORIDE 5 MG/1
5 TABLET ORAL
Status: DISCONTINUED | OUTPATIENT
Start: 2019-02-01 | End: 2019-02-01 | Stop reason: HOSPADM

## 2019-02-01 RX ORDER — LIDOCAINE HYDROCHLORIDE 20 MG/ML
INJECTION, SOLUTION INFILTRATION; PERINEURAL PRN
Status: DISCONTINUED | OUTPATIENT
Start: 2019-02-01 | End: 2019-02-01

## 2019-02-01 RX ORDER — CEFAZOLIN SODIUM 2 G/100ML
2 INJECTION, SOLUTION INTRAVENOUS
Status: COMPLETED | OUTPATIENT
Start: 2019-02-01 | End: 2019-02-01

## 2019-02-01 RX ORDER — ONDANSETRON 2 MG/ML
INJECTION INTRAMUSCULAR; INTRAVENOUS PRN
Status: DISCONTINUED | OUTPATIENT
Start: 2019-02-01 | End: 2019-02-01

## 2019-02-01 RX ORDER — ALBUTEROL SULFATE 0.83 MG/ML
2.5 SOLUTION RESPIRATORY (INHALATION) EVERY 4 HOURS PRN
Status: DISCONTINUED | OUTPATIENT
Start: 2019-02-01 | End: 2019-02-01 | Stop reason: HOSPADM

## 2019-02-01 RX ORDER — FENTANYL CITRATE 50 UG/ML
INJECTION, SOLUTION INTRAMUSCULAR; INTRAVENOUS PRN
Status: DISCONTINUED | OUTPATIENT
Start: 2019-02-01 | End: 2019-02-01

## 2019-02-01 RX ORDER — ONDANSETRON 2 MG/ML
4 INJECTION INTRAMUSCULAR; INTRAVENOUS EVERY 30 MIN PRN
Status: DISCONTINUED | OUTPATIENT
Start: 2019-02-01 | End: 2019-02-01 | Stop reason: HOSPADM

## 2019-02-01 RX ORDER — PROPOFOL 10 MG/ML
INJECTION, EMULSION INTRAVENOUS PRN
Status: DISCONTINUED | OUTPATIENT
Start: 2019-02-01 | End: 2019-02-01

## 2019-02-01 RX ORDER — SODIUM CHLORIDE, SODIUM LACTATE, POTASSIUM CHLORIDE, CALCIUM CHLORIDE 600; 310; 30; 20 MG/100ML; MG/100ML; MG/100ML; MG/100ML
INJECTION, SOLUTION INTRAVENOUS CONTINUOUS
Status: DISCONTINUED | OUTPATIENT
Start: 2019-02-01 | End: 2019-02-01 | Stop reason: HOSPADM

## 2019-02-01 RX ORDER — NALOXONE HYDROCHLORIDE 0.4 MG/ML
.1-.4 INJECTION, SOLUTION INTRAMUSCULAR; INTRAVENOUS; SUBCUTANEOUS
Status: DISCONTINUED | OUTPATIENT
Start: 2019-02-01 | End: 2019-02-01 | Stop reason: HOSPADM

## 2019-02-01 RX ORDER — DIMENHYDRINATE 50 MG/ML
INJECTION, SOLUTION INTRAMUSCULAR; INTRAVENOUS PRN
Status: DISCONTINUED | OUTPATIENT
Start: 2019-02-01 | End: 2019-02-01

## 2019-02-01 RX ORDER — ONDANSETRON 4 MG/1
4 TABLET, ORALLY DISINTEGRATING ORAL EVERY 30 MIN PRN
Status: DISCONTINUED | OUTPATIENT
Start: 2019-02-01 | End: 2019-02-01 | Stop reason: HOSPADM

## 2019-02-01 RX ORDER — FENTANYL CITRATE 50 UG/ML
25-50 INJECTION, SOLUTION INTRAMUSCULAR; INTRAVENOUS
Status: CANCELLED | OUTPATIENT
Start: 2019-02-01

## 2019-02-01 RX ORDER — OXYCODONE HYDROCHLORIDE 5 MG/1
5-10 TABLET ORAL EVERY 4 HOURS PRN
Qty: 40 TABLET | Refills: 0 | Status: SHIPPED | OUTPATIENT
Start: 2019-02-01 | End: 2019-07-17

## 2019-02-01 RX ORDER — DIMENHYDRINATE 50 MG/ML
25 INJECTION, SOLUTION INTRAMUSCULAR; INTRAVENOUS
Status: DISCONTINUED | OUTPATIENT
Start: 2019-02-01 | End: 2019-02-01 | Stop reason: HOSPADM

## 2019-02-01 RX ORDER — LIDOCAINE 40 MG/G
CREAM TOPICAL
Status: DISCONTINUED | OUTPATIENT
Start: 2019-02-01 | End: 2019-02-01 | Stop reason: HOSPADM

## 2019-02-01 RX ORDER — BUPIVACAINE HYDROCHLORIDE AND EPINEPHRINE 5; 5 MG/ML; UG/ML
INJECTION, SOLUTION PERINEURAL PRN
Status: DISCONTINUED | OUTPATIENT
Start: 2019-02-01 | End: 2019-02-01

## 2019-02-01 RX ORDER — HYDRALAZINE HYDROCHLORIDE 20 MG/ML
2.5-5 INJECTION INTRAMUSCULAR; INTRAVENOUS EVERY 10 MIN PRN
Status: DISCONTINUED | OUTPATIENT
Start: 2019-02-01 | End: 2019-02-01 | Stop reason: HOSPADM

## 2019-02-01 RX ORDER — DEXAMETHASONE SODIUM PHOSPHATE 10 MG/ML
INJECTION, SOLUTION INTRAMUSCULAR; INTRAVENOUS PRN
Status: DISCONTINUED | OUTPATIENT
Start: 2019-02-01 | End: 2019-02-01

## 2019-02-01 RX ORDER — CEFAZOLIN SODIUM 1 G/3ML
1 INJECTION, POWDER, FOR SOLUTION INTRAMUSCULAR; INTRAVENOUS SEE ADMIN INSTRUCTIONS
Status: DISCONTINUED | OUTPATIENT
Start: 2019-02-01 | End: 2019-02-01 | Stop reason: HOSPADM

## 2019-02-01 RX ORDER — MEPERIDINE HYDROCHLORIDE 25 MG/ML
12.5 INJECTION INTRAMUSCULAR; INTRAVENOUS; SUBCUTANEOUS
Status: DISCONTINUED | OUTPATIENT
Start: 2019-02-01 | End: 2019-02-01 | Stop reason: HOSPADM

## 2019-02-01 RX ADMIN — SODIUM CHLORIDE, POTASSIUM CHLORIDE, SODIUM LACTATE AND CALCIUM CHLORIDE: 600; 310; 30; 20 INJECTION, SOLUTION INTRAVENOUS at 10:45

## 2019-02-01 RX ADMIN — CEFAZOLIN SODIUM 2 G: 2 INJECTION, SOLUTION INTRAVENOUS at 09:49

## 2019-02-01 RX ADMIN — LIDOCAINE HYDROCHLORIDE 1 ML: 10 INJECTION, SOLUTION EPIDURAL; INFILTRATION; INTRACAUDAL; PERINEURAL at 09:23

## 2019-02-01 RX ADMIN — DEXAMETHASONE SODIUM PHOSPHATE 6 MG: 10 INJECTION, SOLUTION INTRAMUSCULAR; INTRAVENOUS at 09:52

## 2019-02-01 RX ADMIN — Medication 20 ML: at 11:48

## 2019-02-01 RX ADMIN — DEXAMETHASONE SODIUM PHOSPHATE 10 MG: 10 INJECTION, SOLUTION INTRAMUSCULAR; INTRAVENOUS at 11:48

## 2019-02-01 RX ADMIN — FENTANYL CITRATE 25 MCG: 50 INJECTION, SOLUTION INTRAMUSCULAR; INTRAVENOUS at 10:11

## 2019-02-01 RX ADMIN — FENTANYL CITRATE 25 MCG: 50 INJECTION, SOLUTION INTRAMUSCULAR; INTRAVENOUS at 10:14

## 2019-02-01 RX ADMIN — FENTANYL CITRATE 50 MCG: 50 INJECTION, SOLUTION INTRAMUSCULAR; INTRAVENOUS at 09:37

## 2019-02-01 RX ADMIN — DIMENHYDRINATE 25 MG: 50 INJECTION, SOLUTION INTRAMUSCULAR; INTRAVENOUS at 09:54

## 2019-02-01 RX ADMIN — MIDAZOLAM 2 MG: 1 INJECTION INTRAMUSCULAR; INTRAVENOUS at 09:37

## 2019-02-01 RX ADMIN — ONDANSETRON 4 MG: 2 INJECTION INTRAMUSCULAR; INTRAVENOUS at 11:08

## 2019-02-01 RX ADMIN — PROPOFOL 200 MG: 10 INJECTION, EMULSION INTRAVENOUS at 09:45

## 2019-02-01 RX ADMIN — LIDOCAINE HYDROCHLORIDE 60 MG: 20 INJECTION, SOLUTION INFILTRATION; PERINEURAL at 09:45

## 2019-02-01 RX ADMIN — SODIUM CHLORIDE, POTASSIUM CHLORIDE, SODIUM LACTATE AND CALCIUM CHLORIDE: 600; 310; 30; 20 INJECTION, SOLUTION INTRAVENOUS at 09:23

## 2019-02-01 RX ADMIN — FENTANYL CITRATE 50 MCG: 50 INJECTION, SOLUTION INTRAMUSCULAR; INTRAVENOUS at 09:45

## 2019-02-01 RX ADMIN — FENTANYL CITRATE 50 MCG: 50 INJECTION, SOLUTION INTRAMUSCULAR; INTRAVENOUS at 10:06

## 2019-02-01 ASSESSMENT — COPD QUESTIONNAIRES
COPD: 1
CAT_SEVERITY: MILD

## 2019-02-01 ASSESSMENT — LIFESTYLE VARIABLES: TOBACCO_USE: 1

## 2019-02-01 NOTE — ANESTHESIA CARE TRANSFER NOTE
Patient: Love Kaiser    Procedure(s):  Left distal radius open reduction and internal fixation    Diagnosis: Left distal radius fracture  Diagnosis Additional Information: No value filed.    Anesthesia Type:   General, LMA     Note:  Airway :Face Mask  Patient transferred to:PACU  Handoff Report: Identifed the Patient, Identified the Reponsible Provider, Reviewed the pertinent medical history, Discussed the surgical course, Reviewed Intra-OP anesthesia mangement and issues during anesthesia, Set expectations for post-procedure period and Allowed opportunity for questions and acknowledgement of understanding      Vitals: (Last set prior to Anesthesia Care Transfer)    CRNA VITALS  2/1/2019 1123 - 2/1/2019 1215      2/1/2019             SpO2:  98 %                Electronically Signed By: JEANNIE Navarro CRNA  February 1, 2019  12:15 PM

## 2019-02-01 NOTE — ANESTHESIA PROCEDURE NOTES
Peripheral nerve/Neuraxial procedure note :  Pre-Procedure    Location: OR   Procedure Times:2/1/2019 11:40 AM and 2/1/2019 11:50 AM  Pre-Anesthestic Checklist: patient identified, IV checked, site marked, risks and benefits discussed, informed consent, monitors and equipment checked, pre-op evaluation, at physician/surgeon's request and post-op pain management    Timeout  Correct Patient: Yes   Correct Procedure: Yes   Correct Site: Yes   Correct Laterality: Yes   Correct Position: Yes   Site Marked: Yes   .   Procedure Documentation    .    Procedure: left .     Ultrasound used to identify targeted nerve, plexus, or vascular marker and placed a needle adjacent to it., Ultrasound was used to visualize the spread of the anesthetic in close proximity to the above stated nerve.   Patient Prep;mask, sterile gloves, chlorhexidine gluconate and isopropyl alcohol.  Nerve Stim: Initial Level 0.05 mA.  Lowest motor response 0.22 mA..  Needle: insulated Needle Gauge: 22.  Needle Length (millimeters) 50  Insertion Method: Single Shot.       Assessment/Narrative  Paresthesias: No.  Injection made incrementally with aspirations every 5 mL..  The placement was negative for: blood aspirated, painful injection and site bleeding.  Bolus given via..   Secured via.   Complications: none. Test dose of mL at. Test dose negative for signs of intravascular, subdural or intrathecal injection. Comments:  Pt tolerated the procedure well as still under GA.  I was able to see structures and illicit twitches in all 4 areas.  No complications noted.  Will follow if needed.Nerve block type: Axillary Block.

## 2019-02-01 NOTE — INTERVAL H&P NOTE
This H&P has been reviewed and there are no clinically significant changes in the patient s condition.  The patient was evaluated by myself as well as Dr. Gauri Kenney prior to surgery. The Patient is approved for the surgery and the stated surgical procedure is still clinically indicated.

## 2019-02-01 NOTE — OR NURSING
1153:  Verbal report received from Luzma MALIN RN and Kevin COLLIER. Phase 1 recovery assumed by Lissette FREEMAN. Pt post-op general anesthesia for left distal radius ORIF per .

## 2019-02-01 NOTE — BRIEF OP NOTE
[facility] Brief Operative Note    Pre-operative diagnosis: left comminuted displaced distal radius fracture   Post-operative diagnosis: left comminuted displaced distal radius fracture   Procedure: Procedure(s):  Left distal radius open reduction and internal fixation   Surgeon:  Anesthesia: Ashutosh Andrews DO  General   Assistant(s): Odell England, APRN, CNP, DNP (A advanced practice provider was necessary for his expertise, exposure and surgical assistance throughout the case.)   Estimated blood loss:  Fluids:  Implant:  TT/Pressure: Less than 20 ml  1200 ml Crystalloids  Hand innovation  58 minutes at 250 mmHg   Specimens: None   Findings: See dictated operative report for full details [id number]     Maximino Andrews D.O.

## 2019-02-01 NOTE — ANESTHESIA PREPROCEDURE EVALUATION
Anesthesia Evaluation     . Pt has had prior anesthetic. Type: General    No history of anesthetic complications          ROS/MED HX    ENT/Pulmonary:     (+)tobacco use, Current use mild COPD, , . .   (-) recent URI   Neurologic:  - neg neurologic ROS     Cardiovascular:     (+) Dyslipidemia, hypertension--CAD, angina (prinzemetal angina)-at rest, -. : . . . :. . Previous cardiac testing date:results:date: results:ECG reviewed date:1-30-19 results:Sinus Rhythm   WITHIN NORMAL LIMITS date: results:          METS/Exercise Tolerance:     Hematologic:  - neg hematologic  ROS       Musculoskeletal: Comment: Previous left shoulder replacement, has left wrist fracture today.  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    ASA Status:  3 .    NPO Status:  > 8 hours    Plan for General, LMA and Periph. Nerve Block for postop pain with Intravenous and Propofol induction. Maintenance will be Inhalation and Balanced.    PONV prophylaxis:  Ondansetron (or other 5HT-3), Dexamethasone or Solumedrol and Meclizine or Dimenhydrinate  Will place an axillary block in the OR after the case  for post-op pain      Postoperative Care  Postoperative pain management:  IV analgesics, Oral pain medications and Peripheral nerve block (Single Shot).      Consents  Anesthetic plan, risks, benefits and alternatives discussed with:  Patient or representative and Patient.  Use of blood products discussed: No .   .                          .

## 2019-02-01 NOTE — ANESTHESIA POSTPROCEDURE EVALUATION
Patient: Love Kaiser    Procedure(s):  Left distal radius open reduction and internal fixation    Diagnosis:Left distal radius fracture  Diagnosis Additional Information: No value filed.    Anesthesia Type:  General, LMA    Note:  Anesthesia Post Evaluation    Patient location during evaluation: PACU  Patient participation: Able to participate in evaluation but full recovery from regional anesthesia has not yet ocurrred but is anticipated to occur within 48 hours  Level of consciousness: awake and awake and alert  Pain management: adequate  Airway patency: patent  Cardiovascular status: acceptable, stable and blood pressure returned to baseline  Respiratory status: acceptable, room air and nonlabored ventilation  Hydration status: acceptable  PONV: none     Anesthetic complications: None    Comments: Patient was happy with the anesthesia care received and no anesthesia related complications were noted.  Pain is 0/10.  I will follow up with the patient again if it is needed.        Last vitals:  Vitals:    02/01/19 1200 02/01/19 1205 02/01/19 1210   BP: 131/72 142/76    Pulse: 78 75    Resp: 17 15    Temp:  98.3  F (36.8  C)    SpO2: 98% 99% 98%         Electronically Signed By: JEANNIE Navarro CRNA  February 1, 2019  12:20 PM

## 2019-02-01 NOTE — DISCHARGE INSTRUCTIONS
[facility]   Adult Discharge Orders & Instructions     For 24 hours after surgery    1. Get plenty of rest.  A responsible adult must stay with you for at least 24 hours after you leave the hospital.   2. Do not drive or use heavy equipment.  If you have weakness or tingling, don't drive or use heavy equipment until this feeling goes away.  3. Do not drink alcohol.  4. Avoid strenuous or risky activities.  Ask for help when climbing stairs.   5. You may feel lightheaded.  If so, sit for a few minutes before standing.  Have someone help you get up.   6. You may have a slight fever. Call the doctor if your fever is over 100 F (37.7 C) (taken under the tongue) or lasts longer than 24 hours.  7. You may have a dry mouth, a sore throat, muscle aches or trouble sleeping.  These should go away after 24 hours.  8. Do not make important or legal decisions.  We don t expect you to have any problems from the surgery or treatment you had today. Just in case, here s what to do if you have pain, upset stomach (nausea), bleeding or infection:  Pain:  Take medicines your doctor has prescribed or over-the-counter medicine they have suggested. Resting and using ice packs can help, too. For surgery on an arm or leg, raise it on a pillow to ease swelling. Call your doctor if these methods don t work.  Copyright Jose Daniel Gutierrez, Licensed under [employer]  Upset stomach (nausea):  Take anti-nausea medicine approved by your doctor. Drink clear liquids like apple juice, ginger ale, broth or 7-Up. Be sure to drink enough fluids. Rest can help, too. Move to normal foods when you re ready.     Bleeding:  In the first 24 hours, you may see a little blood on your dressing, about the size of a quarter. You don t need to worry about this much blood, but if the blood spot keeps getting bigger:    Put pressure on the wound if you can, AND    Call your doctor.  Copyright [employer], Licensed under CC[employer]  International  Fever/Infection: Please call your doctor if you have any of these signs:    Redness    Swelling    Wound feels warm    Pain gets worse    Bad-smelling fluid leaks from wound    Fever or chills  Call your doctor for any of the followin.  It has been over 8 to 10 hours since surgery and you are still not able to urinate (pass water).    2.  Headache for over 24 hours.       Bone [facility] Joint line: [phone] (24 hour available no.)  [place] Nurse advice line: [phone] (24 hour available no.)

## 2019-02-02 ENCOUNTER — NURSE TRIAGE (OUTPATIENT)
Dept: NURSING | Facility: CLINIC | Age: 69
End: 2019-02-02

## 2019-02-02 NOTE — OP NOTE
Procedure Date: 02/01/2019      PREOPERATIVE DIAGNOSIS:  Left comminuted displaced distal radius fracture.      POSTOPERATIVE DIAGNOSIS:  Left comminuted displaced distal radius fracture.      PROCEDURE:  Left distal radius open reduction and internal fixation.      SURGEON:  Ashutosh Andrews DO      FIRST ASSISTANT:  JEANNIE Wright (utilized throughout the procedure, assisting with soft tissue retraction, assisting with implant placement, and he provided skin closure).      ANESTHESIA:  General with postoperative axillary nerve block.      ESTIMATED BLOOD LOSS:  Less than 20 mL.      FLUIDS:  1200 mL crystalloids.        IMPLANT:  [employer].      TOURNIQUET TIME AND PRESSURE:  Was 58 minutes at 250 mmHg.      COUNTS:  Correct.      DISPOSITION:  To PACU in stable condition.      GROSS FINDINGS:  There was a distal radius fracture with some dorsal comminution.  There was also comminution along the volar aspect extending into the metaphyseal diaphyseal junction.  Due to this, I utilized a longer plate.      INDICATIONS:  This is a 68-year-old female with a fall with the above injury.  She was seen at an outside hospital and underwent closed reduction and splinting.  Subsequently, followed up in my office.  Repeat radiograph showed a significant loss of radial height, inclination and approximately 20 degrees apex volar tilt.  With this amount of displacement, we discussed treatment options.  Once reviewed, she opted to proceed with open reduction internal fixation.  I had a lengthy discussion regarding risks, benefits, complications, alternatives and anticipated postop course.  Risks including bleeding, infection, neurovascular injury, numbness, tingling, stiffness discussed.  Postoperative time for recovery reviewed.  Once reviewed, she opted to proceed.      DETAILS OF PROCEDURE:  She was met preoperatively, again informed consent was verified, appropriate extremity was marked, and she was wheeled to OP suite  #1.  Transferred to the OR table without any issues.  When deemed appropriate by Anesthesia, left upper extremity was sterilely prepped and draped in normal manner.  Prior to incision, a timeout was performed.  Again, the appropriate patient, surgery and extremity were verified and antibiotics administered.  An FCR approach was utilized.  The skin was sharply incised.  A combination of blunt and sharp dissection brought it down to the FCR tendon and sheath.  This was then split with a separate 15 blade.  The FCR tendon was retracted radially.  The deep layer opened with a combination of a scissors and a blade.  The FPL was swept out of the way.  A blunt Weitlaner retractor was placed.  The pronator was identified and released on the radial aspect.  Blunt dissection with a periosteal elevator completed it.  At this time, was noted the comminution that extended into the metaphyseal diaphyseal junction.  With this I did extend the skin incision and dissection to allow for a longer plate.  I provisionally reduced and held it with a Emery wire from the radial styloid.  I then placed the plate in place.  Confirmed it to be excessively distally and placed a wire.  I then aligned it down the shaft and placed a second wire.  I then placed 2 screws in standard AO fashion distally in the shaft.  I reevaluated under fluoroscopy after placing these in standard AO fashion of drill measure drill and showing that the plate had slipped ulnarly.  The screws were then removed and I repositioned it.  Again, the screws were placed in standard AO fashion with no issues and plunging.  There was good purchase with the screws.  With this completed, I turned my attention distally.  I placed the smooth peg locking construct distally.  These were all done in standard fashion with no issues with plunging.  With this complete, I verified under fluoroscopy.  It showed an acceptable reduction and re-creation of radial height, inclination and  volar tilt.  There was no prominence of the hardware.  The DRUJ appeared to be stable on exam as well.  The tourniquet was deflated.  Hemostasis had been achieved on the approach.  The area was copiously irrigated.  Approximated the pronator back over the place.  However, there was no fascia to repair back down.  The subcutaneous tissue was then closed with 3-0 Vicryl, followed with running 4-0 Monocryl suture with some Steri-Strips on the skin.  A sterile bandage applied with a splint.  She was subsequently transferred to PACU in stable condition, to be discharged home after an axillary nerve block.  Sent home with oxycodone, Tylenol for pain.  Followup appointment has been scheduled, discharge instructions provided.         DAVID AZEVEDO DO             D: 2019   T: 2019   MT: MIKE      Name:     COCO GARDUNO   MRN:      [id number]        Account:        [id number]   :      1950           Procedure Date: 2019      Document: [id number]

## 2019-02-03 NOTE — TELEPHONE ENCOUNTER
"Pt reports that she developed L arm weakness at 3AM today, she describes lifting her arm up and then it \"just falls down\" landing in her lap.  She also reports persistent finger tingling.  She denies pain.    Disposition:  Page on-call Surgeon  7:47PM:  Smart Web used to page on-call Dr. Andrews to call patient at [phone].  Advised pt to call FNA back if she does not hear from the doctor within 20 minutes.  Pt verbalized understanding and had no further questions.     Nirmala Conklin RN/DERREK    Reason for Disposition    Caller has URGENT question and triager unable to answer question    Additional Information    Negative: Sounds like a life-threatening emergency to the triager    Negative: Chest pain    Negative: Difficulty breathing    Negative: Surgical incision symptoms and questions    Negative: [1] Discomfort (pain, burning or stinging) when passing urine AND [2] male    Negative: [1] Discomfort (pain, burning or stinging) when passing urine AND [2] female    Negative: Constipation    Negative: Calf pain    Negative: Dizziness is severe, or persists > 24 hours after surgery    Negative: Pain, redness, swelling, or pus at IV Site    Negative: Symptoms arising from use of a urinary catheter (Gandara or Coude)    Negative: Cast problems or questions    Negative: Medication question    Negative: [1] Widespread rash AND [2] bright red, sunburn-like    Negative: [1] SEVERE headache AND [2] after spinal (epidural) anesthesia    Negative: [1] Vomiting AND [2] persists > 4 hours    Negative: [1] Vomiting AND [2] abdomen looks much more swollen than usual    Negative: [1] Drinking very little AND [2] dehydration suspected (e.g., no urine > 12 hours, very dry mouth, very lightheaded)    Negative: Patient sounds very sick or weak to the triager    Negative: Sounds like a serious complication to the triager    Negative: Fever > 100.5 F (38.1 C)    Negative: [1] SEVERE post-op pain (e.g., excruciating, pain scale 8-10) AND " [2] not controlled with pain medications    Protocols used: POST-OP SYMPTOMS AND QUESTIONS-ADULT-

## 2019-02-04 NOTE — OR NURSING
"[facility]  Discharge Call Back  Love Kaiser  1950  MRN: [id number]  Home: [phone] (home)   PCP: Gauri Kenney    We are calling to see how you're doing since your surgery/procedure with us?   Comments: \"I'm doing really well\"  Clinical Questions  1. Have you had time to look at your discharge instructions? Do you have any questions in regards to the instructions?   Comment: yes/yes, asking about block  2. Do you feel your pain is being controlled with the regimen the surgeon sent you home on? (ie: prescription medications, over the counter pain medications, ice packs)   Comments: yes  3. Have you noticed any drainage on your dressing? Do you know what to do if you have bleeding as a result of your procedure?   Comments: no/yes  4. Have you had any nausea/vomiting? Do you know how to treat this?   Comment: no/yes  5. Have you had any signs/symptoms of infection? (ie: fever, swelling, heat, drainage or redness) Do you know what to do if you have?   Comment: no/yes  6. Do you have a follow up appointment made with your surgeon? Do you have a number to contact them at if you need it?   Comment: yes/yes  Retained Foreign Object (PERRI, Hemovac, Penrose, Wound Packing, Vaginal Packing, Nasal Splints, Urethral Stents, Gandara Catheter)  1. Do you still have NA in place?   2. If the item is still in place, can you review the plan for removal with me? NA  Recognition  Is there anyone from your care team that you would like to recognize?   Comments: \"everyone sure was great and wonderful!\"  Improvement  Our goal is to be the best. Do you have any suggestions for things that we could improve on?   Comments: none noted  You may be randomly selected to fill out a [facility] survey. We would appreciate you taking the time to fill this out. It is important to us if you would answer all of the questions on the survey.            "

## 2019-02-13 DIAGNOSIS — J41.0 SIMPLE CHRONIC BRONCHITIS (H): ICD-10-CM

## 2019-02-13 DIAGNOSIS — J44.1 COPD EXACERBATION (H): ICD-10-CM

## 2019-02-13 RX ORDER — ALBUTEROL SULFATE 90 UG/1
AEROSOL, METERED RESPIRATORY (INHALATION)
Qty: 8.5 G | Refills: 4 | Status: SHIPPED | OUTPATIENT
Start: 2019-02-13 | End: 2020-03-26

## 2019-02-13 NOTE — TELEPHONE ENCOUNTER
Proair    Prescription approved per [facility] Refill Protocol.    Annamaria Montez, RN, BSN

## 2019-02-14 ENCOUNTER — OFFICE VISIT (OUTPATIENT)
Dept: ORTHOPEDICS | Facility: OTHER | Age: 69
End: 2019-02-14
Payer: COMMERCIAL

## 2019-02-14 ENCOUNTER — ANCILLARY PROCEDURE (OUTPATIENT)
Dept: GENERAL RADIOLOGY | Facility: OTHER | Age: 69
End: 2019-02-14
Attending: ORTHOPAEDIC SURGERY
Payer: COMMERCIAL

## 2019-02-14 VITALS
DIASTOLIC BLOOD PRESSURE: 83 MMHG | BODY MASS INDEX: 28.35 KG/M2 | HEART RATE: 83 BPM | WEIGHT: 160 LBS | SYSTOLIC BLOOD PRESSURE: 148 MMHG | HEIGHT: 63 IN

## 2019-02-14 DIAGNOSIS — S52.532A CLOSED COLLES' FRACTURE OF LEFT RADIUS, INITIAL ENCOUNTER: Primary | ICD-10-CM

## 2019-02-14 DIAGNOSIS — S52.532A CLOSED COLLES' FRACTURE OF LEFT RADIUS, INITIAL ENCOUNTER: ICD-10-CM

## 2019-02-14 PROCEDURE — 99024 POSTOP FOLLOW-UP VISIT: CPT | Performed by: ORTHOPAEDIC SURGERY

## 2019-02-14 PROCEDURE — 73110 X-RAY EXAM OF WRIST: CPT | Mod: LT

## 2019-02-14 ASSESSMENT — MIFFLIN-ST. JEOR: SCORE: 1227.27

## 2019-02-14 ASSESSMENT — PAIN SCALES - GENERAL: PAINLEVEL: NO PAIN (0)

## 2019-02-14 NOTE — PROGRESS NOTES
Orthopedic Clinic Post-Operative Note    CHIEF COMPLAINT:   Chief Complaint   Patient presents with     Surgical Followup     DOS: 2/1/2019~Left distal radius open reduction and internal fixation~13 days postop- DOI: 1/21/2019       HISTORY OF PRESENT ILLNESS  Pain is controlled.  Initially had numbness to all the digits.  That is since significantly improved and has minimal to the thumb and index.  No other issues.  Presents with her son  Patient's past medical, surgical, social and family histories reviewed.     Past Medical History:   Diagnosis Date     Acute alcoholic hepatitis      Acute pancreatitis 08/07/06    aDMIT. dISCHARGED 08/10/06     Cancer of parotid gland (H) 1974     Chronic airway obstruction, not elsewhere classified      COPD (chronic obstructive pulmonary disease) (H) 3/30/2009     Diarrhea      Hyposmolality and/or hyponatremia      Other and unspecified alcohol dependence, continuous drinking behavior      Other chronic nonalcoholic liver disease      Unspecified protein-calorie malnutrition (H)        Past Surgical History:   Procedure Laterality Date     ARTHROPLASTY SHOULDER Right 5/26/2015    Procedure: ARTHROPLASTY SHOULDER;  Surgeon: Ashutosh Andrews DO;  Location: [facility] OR     C REMOVAL OF OVARY(S)       [facility] AFF SURGICAL PATHOLOGY  1974    parotid tumor with malignancy     COLONOSCOPY  09/21/09     COLONOSCOPY N/A 7/6/2016    Procedure: COMBINED COLONOSCOPY, SINGLE OR MULTIPLE BIOPSY/POLYPECTOMY BY BIOPSY;  Surgeon: John Bellamy MD;  Location: [facility] GI     EXCISE MASS FOOT  1/17/2012    Procedure:EXCISE MASS FOOT; Excision of Mass Right Foot; Surgeon:CARRIE PAGAN; Location:[facility] OR     FOREIGN BODY REMOVAL  07/08/10    Soft tissue mass w/peripheral metal fragments     HC COLONOSCOPY W BIOPSY  11/22/06     HC COLONOSCOPY W BIOPSY  10/31/07     [facility] UGI ENDOSCOPY DIAG W BIOPSY  11/22/06     HYSTERECTOMY, PAP NO LONGER INDICATED       HYSTERECTOMY, FIDE       OPEN REDUCTION  INTERNAL FIXATION WRIST Left 2019    Procedure: Left distal radius open reduction and internal fixation;  Surgeon: Ashutosh Andrews DO;  Location: [facility] OR       Medications:    Current Outpatient Medications on File Prior to Visit:  acetaminophen (TYLENOL) 325 MG tablet Take 3 tablets (975 mg) by mouth every 8 hours as needed for mild pain   aspirin 81 MG tablet Take 1 tablet (81 mg) by mouth daily   Atorvastatin Calcium (LIPITOR PO) Take 40 mg by mouth daily   buPROPion (WELLBUTRIN XL) 150 MG 24 hr tablet Take 1 tablet (150 mg) by mouth every morning   CARTIA  MG 24 hr capsule Take 180 mg by mouth daily   docusate sodium (COLACE) 100 MG capsule Take 1 capsule (100 mg) by mouth daily   NITROGLYCERIN SL Place 0.4 mg under the tongue Reported on 2017   POTASSIUM 75 MG OR TABS 1 TABLET  DAILY   PROAIR  (90 Base) MCG/ACT inhaler INHALE TWO PUFFS BY MOUTH EVERY FOUR HOURS AS NEEDED    VITAMIN B-12 500 MCG OR TABS daily   VITAMIN C 500 MG OR TABS ONE TABLET DAILY   VITAMIN D-3 SUPER STRENGTH 2000 UNIT OR TABS daily   [] oxyCODONE (ROXICODONE) 5 MG tablet Take 1-2 tablets (5-10 mg) by mouth every 4 hours as needed for moderate to severe pain     No current facility-administered medications on file prior to visit.     Allergies   Allergen Reactions     Nickel Itching     No Known Drug Allergies        Social History     Occupational History     Occupation: unemployed   Tobacco Use     Smoking status: Current Every Day Smoker     Packs/day: 0.50     Years: 29.00     Pack years: 14.50     Types: Cigarettes     Smokeless tobacco: Never Used   Substance and Sexual Activity     Alcohol use: No     Alcohol/week: 14.0 oz     Comment: quit drinking     Drug use: No     Sexual activity: Yes     Partners: Male     Birth control/protection: None     Comment: not currently       Family History   Problem Relation Age of Onset     Arthritis Mother         RA     Cancer Mother         female organs      "Cancer Father         colon       REVIEW OF SYSTEMS  General: negative for, night sweats, dizziness, fatigue  Resp: No shortness of breath and no cough  CV: negative for chest pain, syncope or near-syncope  GI: negative for nausea, vomiting and diarrhea  : negative for dysuria and hematuria  Musculoskeletal: as above  Neurologic: negative for syncope   Hematologic: negative for bleeding disorder    Physical Exam:  Vitals: /83 (BP Location: Left arm, Patient Position: Chair, Cuff Size: Adult Large)   Pulse 83   Ht 1.588 m (5' 2.52\")   Wt 72.6 kg (160 lb)   BMI 28.78 kg/m    BMI= Body mass index is 28.78 kg/m .  Constitutional: healthy, alert and no acute distress   Psychiatric: mentation appears normal and affect normal/bright  NEURO: no focal deficits  SKIN: .healing well  JOINT/EXTREMITIES: Compartments are soft and compressible.  Distal neurovascular is intact.  Able to move the fingers with no issues.  Wrist range of motion not tested  GAIT: not tested     Diagnostic Modalities:  left wrist X-ray: Comminuted distal radius fracture noted.  Hardware in place with no change in alignment.  No evidence of loosening.  Independent visualization of the images was performed.      Impression:   Chief Complaint   Patient presents with     Surgical Followup     DOS: 2/1/2019~Left distal radius open reduction and internal fixation~13 days postop- DOI: 1/21/2019   Doing as expected.    Plan:   Activity: No pushing, pulling, and lifting  Staples/Sutures out: Not applicable.  Pain controlled: Yes. Continue to use: Nothing  Immobilzation: Yes, Cockup wrist splint provided.  Physical Therapy: none at this time. I have encouraged her to remove the splint at times to work on some very gentle range of motion to the wrist and hand.  Rest, Ice.  Plan to start therapy and follow-up  Return to clinic 3, week(s), or sooner as needed for changes.    Re-x-ray on return: Yes.    Maximino Andrews D.O.  "

## 2019-02-14 NOTE — LETTER
2/14/2019         RE: Love Kaiser  [address] [facility] [address]        Dear Colleague,    Thank you for referring your patient, Love Kaiser, to the [facility]. Please see a copy of my visit note below.    Orthopedic Clinic Post-Operative Note    CHIEF COMPLAINT:   Chief Complaint   Patient presents with     Surgical Followup     DOS: 2/1/2019~Left distal radius open reduction and internal fixation~13 days postop- DOI: 1/21/2019       HISTORY OF PRESENT ILLNESS  Pain is controlled.  Initially had numbness to all the digits.  That is since significantly improved and has minimal to the thumb and index.  No other issues.  Presents with her son  Patient's past medical, surgical, social and family histories reviewed.     Past Medical History:   Diagnosis Date     Acute alcoholic hepatitis      Acute pancreatitis 08/07/06    aDMIT. dISCHARGED 08/10/06     Cancer of parotid gland (H) 1974     Chronic airway obstruction, not elsewhere classified      COPD (chronic obstructive pulmonary disease) (H) 3/30/2009     Diarrhea      Hyposmolality and/or hyponatremia      Other and unspecified alcohol dependence, continuous drinking behavior      Other chronic nonalcoholic liver disease      Unspecified protein-calorie malnutrition (H)        Past Surgical History:   Procedure Laterality Date     ARTHROPLASTY SHOULDER Right 5/26/2015    Procedure: ARTHROPLASTY SHOULDER;  Surgeon: Ashutosh Andrews DO;  [facility]     C REMOVAL OF OVARY(S)       CL AFF SURGICAL PATHOLOGY  1974    parotid tumor with malignancy     COLONOSCOPY  09/21/09     COLONOSCOPY N/A 7/6/2016    Procedure: COMBINED COLONOSCOPY, SINGLE OR MULTIPLE BIOPSY/POLYPECTOMY BY BIOPSY;  Surgeon: John Bellamy MD;  [facility]: [facility]     EXCISE MASS FOOT  1/17/2012    Procedure:EXCISE MASS FOOT; Excision of Mass Right Foot; Surgeon:CARRIE PAGAN; Location:[facility]     FOREIGN BODY REMOVAL  07/08/10    Soft  tissue mass w/peripheral metal fragments     HC COLONOSCOPY W BIOPSY  06     HC COLONOSCOPY W BIOPSY  10/31/07     HC UGI ENDOSCOPY DIAG W BIOPSY  06     HYSTERECTOMY, PAP NO LONGER INDICATED       HYSTERECTOMY, FIDE       OPEN REDUCTION INTERNAL FIXATION WRIST Left 2019    Procedure: Left distal radius open reduction and internal fixation;  Surgeon: Ashutosh Andrews DO;  [facility]       Medications:    Current Outpatient Medications on File Prior to Visit:  acetaminophen (TYLENOL) 325 MG tablet Take 3 tablets (975 mg) by mouth every 8 hours as needed for mild pain   aspirin 81 MG tablet Take 1 tablet (81 mg) by mouth daily   Atorvastatin Calcium (LIPITOR PO) Take 40 mg by mouth daily   buPROPion (WELLBUTRIN XL) 150 MG 24 hr tablet Take 1 tablet (150 mg) by mouth every morning   CARTIA  MG 24 hr capsule Take 180 mg by mouth daily   docusate sodium (COLACE) 100 MG capsule Take 1 capsule (100 mg) by mouth daily   NITROGLYCERIN SL Place 0.4 mg under the tongue Reported on 2017   POTASSIUM 75 MG OR TABS 1 TABLET  DAILY   PROAIR  (90 Base) MCG/ACT inhaler INHALE TWO PUFFS BY MOUTH EVERY FOUR HOURS AS NEEDED    VITAMIN B-12 500 MCG OR TABS daily   VITAMIN C 500 MG OR TABS ONE TABLET DAILY   VITAMIN D-3 SUPER STRENGTH 2000 UNIT OR TABS daily   [] oxyCODONE (ROXICODONE) 5 MG tablet Take 1-2 tablets (5-10 mg) by mouth every 4 hours as needed for moderate to severe pain     No current facility-administered medications on file prior to visit.     Allergies   Allergen Reactions     Nickel Itching     No Known Drug Allergies        Social History     Occupational History     Occupation: unemployed   Tobacco Use     Smoking status: Current Every Day Smoker     Packs/day: 0.50     Years: 29.00     Pack years: 14.50     Types: Cigarettes     Smokeless tobacco: Never Used   Substance and Sexual Activity     Alcohol use: No     Alcohol/week: 14.0 oz     Comment: quit drinking      "Drug use: No     Sexual activity: Yes     Partners: Male     Birth control/protection: None     Comment: not currently       Family History   Problem Relation Age of Onset     Arthritis Mother         RA     Cancer Mother         female organs     Cancer Father         colon       REVIEW OF SYSTEMS  General: negative for, night sweats, dizziness, fatigue  Resp: No shortness of breath and no cough  CV: negative for chest pain, syncope or near-syncope  GI: negative for nausea, vomiting and diarrhea  : negative for dysuria and hematuria  Musculoskeletal: as above  Neurologic: negative for syncope   Hematologic: negative for bleeding disorder    Physical Exam:  Vitals: /83 (BP Location: Left arm, Patient Position: Chair, Cuff Size: Adult Large)   Pulse 83   Ht 1.588 m (5' 2.52\")   Wt 72.6 kg (160 lb)   BMI 28.78 kg/m     BMI= Body mass index is 28.78 kg/m .  Constitutional: healthy, alert and no acute distress   Psychiatric: mentation appears normal and affect normal/bright  NEURO: no focal deficits  SKIN: .healing well  JOINT/EXTREMITIES: Compartments are soft and compressible.  Distal neurovascular is intact.  Able to move the fingers with no issues.  Wrist range of motion not tested  GAIT: not tested     Diagnostic Modalities:  left wrist X-ray: Comminuted distal radius fracture noted.  Hardware in place with no change in alignment.  No evidence of loosening.  Independent visualization of the images was performed.      Impression:   Chief Complaint   Patient presents with     Surgical Followup     DOS: 2/1/2019~Left distal radius open reduction and internal fixation~13 days postop- DOI: 1/21/2019   Doing as expected.    Plan:   Activity: No pushing, pulling, and lifting  Staples/Sutures out: Not applicable.  Pain controlled: Yes. Continue to use: Nothing  Immobilzation: Yes, Cockup wrist splint provided.  Physical Therapy: none at this time. I have encouraged her to remove the splint at times to work on " some very gentle range of motion to the wrist and hand.  Rest, Ice.  Plan to start therapy and follow-up  Return to clinic 3, week(s), or sooner as needed for changes.    Re-x-ray on return: Yes.    Maximino Andrews D.O.    Again, thank you for allowing me to participate in the care of your patient.        Sincerely,        Ashutosh Andrews, DO

## 2019-03-06 ENCOUNTER — ANCILLARY PROCEDURE (OUTPATIENT)
Dept: GENERAL RADIOLOGY | Facility: OTHER | Age: 69
End: 2019-03-06
Attending: ORTHOPAEDIC SURGERY
Payer: COMMERCIAL

## 2019-03-06 ENCOUNTER — OFFICE VISIT (OUTPATIENT)
Dept: ORTHOPEDICS | Facility: OTHER | Age: 69
End: 2019-03-06
Payer: COMMERCIAL

## 2019-03-06 VITALS
HEIGHT: 63 IN | WEIGHT: 160 LBS | DIASTOLIC BLOOD PRESSURE: 72 MMHG | BODY MASS INDEX: 28.35 KG/M2 | SYSTOLIC BLOOD PRESSURE: 124 MMHG

## 2019-03-06 DIAGNOSIS — S52.532A CLOSED COLLES' FRACTURE OF LEFT RADIUS, INITIAL ENCOUNTER: ICD-10-CM

## 2019-03-06 DIAGNOSIS — S52.532A CLOSED COLLES' FRACTURE OF LEFT RADIUS, INITIAL ENCOUNTER: Primary | ICD-10-CM

## 2019-03-06 PROCEDURE — 99024 POSTOP FOLLOW-UP VISIT: CPT | Performed by: ORTHOPAEDIC SURGERY

## 2019-03-06 PROCEDURE — 73110 X-RAY EXAM OF WRIST: CPT | Mod: LT

## 2019-03-06 ASSESSMENT — PAIN SCALES - GENERAL: PAINLEVEL: NO PAIN (0)

## 2019-03-06 ASSESSMENT — MIFFLIN-ST. JEOR: SCORE: 1227.27

## 2019-03-06 NOTE — PROGRESS NOTES
Orthopedic Clinic Post-Operative Note    CHIEF COMPLAINT:   Chief Complaint   Patient presents with     RECHECK     left wrist follow up- DOI: 1/21/2019     Surgical Followup     DOS: 2/1/2019~Left distal radius open reduction and internal fixation~5 weeks postop       HISTORY OF PRESENT ILLNESS  No pain.  Numbness and tingling much better just some very minimal tingling in the thumb and index finger.  Wearing her brace    Patient's past medical, surgical, social and family histories reviewed.     Past Medical History:   Diagnosis Date     Acute alcoholic hepatitis      Acute pancreatitis 08/07/06    aDMIT. dISCHARGED 08/10/06     Cancer of parotid gland (H) 1974     Chronic airway obstruction, not elsewhere classified      COPD (chronic obstructive pulmonary disease) (H) 3/30/2009     Diarrhea      Hyposmolality and/or hyponatremia      Other and unspecified alcohol dependence, continuous drinking behavior      Other chronic nonalcoholic liver disease      Unspecified protein-calorie malnutrition (H)        Past Surgical History:   Procedure Laterality Date     ARTHROPLASTY SHOULDER Right 5/26/2015    Procedure: ARTHROPLASTY SHOULDER;  Surgeon: Ashutosh Andrews DO;  [facility]     C REMOVAL OF OVARY(S)       CL AFF SURGICAL PATHOLOGY  1974    parotid tumor with malignancy     COLONOSCOPY  09/21/09     COLONOSCOPY N/A 7/6/2016    Procedure: COMBINED COLONOSCOPY, SINGLE OR MULTIPLE BIOPSY/POLYPECTOMY BY BIOPSY;  Surgeon: John Bellamy MD;  [facility]     EXCISE MASS FOOT  1/17/2012    Procedure:EXCISE MASS FOOT; Excision of Mass Right Foot; Surgeon:CARRIE PAGAN; [facility]     FOREIGN BODY REMOVAL  07/08/10    Soft tissue mass w/peripheral metal fragments     HC COLONOSCOPY W BIOPSY  11/22/06     HC COLONOSCOPY W BIOPSY  10/31/07     HC UGI ENDOSCOPY DIAG W BIOPSY  11/22/06     HYSTERECTOMY, PAP NO LONGER INDICATED       HYSTERECTOMY, FIDE       OPEN REDUCTION INTERNAL  FIXATION WRIST Left 2019    Procedure: Left distal radius open reduction and internal fixation;  Surgeon: Ashutosh Andrews DO;  [facility]       Medications:    Current Outpatient Medications on File Prior to Visit:  [] acetaminophen (TYLENOL) 325 MG tablet Take 3 tablets (975 mg) by mouth every 8 hours as needed for mild pain   aspirin 81 MG tablet Take 1 tablet (81 mg) by mouth daily   Atorvastatin Calcium (LIPITOR PO) Take 40 mg by mouth daily   buPROPion (WELLBUTRIN XL) 150 MG 24 hr tablet Take 1 tablet (150 mg) by mouth every morning   CARTIA  MG 24 hr capsule Take 180 mg by mouth daily   docusate sodium (COLACE) 100 MG capsule Take 1 capsule (100 mg) by mouth daily   NITROGLYCERIN SL Place 0.4 mg under the tongue Reported on 2017   [] oxyCODONE (ROXICODONE) 5 MG tablet Take 1-2 tablets (5-10 mg) by mouth every 4 hours as needed for moderate to severe pain   POTASSIUM 75 MG OR TABS 1 TABLET  DAILY   PROAIR  (90 Base) MCG/ACT inhaler INHALE TWO PUFFS BY MOUTH EVERY FOUR HOURS AS NEEDED    VITAMIN B-12 500 MCG OR TABS daily   VITAMIN C 500 MG OR TABS ONE TABLET DAILY   VITAMIN D-3 SUPER STRENGTH 2000 UNIT OR TABS daily     No current facility-administered medications on file prior to visit.     Allergies   Allergen Reactions     Nickel Itching     No Known Drug Allergies        Social History     Occupational History     Occupation: unemployed   Tobacco Use     Smoking status: Current Every Day Smoker     Packs/day: 0.50     Years: 29.00     Pack years: 14.50     Types: Cigarettes     Smokeless tobacco: Never Used   Substance and Sexual Activity     Alcohol use: No     Alcohol/week: 14.0 oz     Comment: quit drinking     Drug use: No     Sexual activity: Yes     Partners: Male     Birth control/protection: None     Comment: not currently       Family History   Problem Relation Age of Onset     Arthritis Mother         RA     Cancer Mother         female organs      "Cancer Father         colon       REVIEW OF SYSTEMS  General: negative for, night sweats, dizziness, fatigue  Resp: No shortness of breath and no cough  CV: negative for chest pain, syncope or near-syncope  GI: negative for nausea, vomiting and diarrhea  : negative for dysuria and hematuria  Musculoskeletal: as above  Neurologic: negative for syncope   Hematologic: negative for bleeding disorder    Physical Exam:  Vitals: /72   Ht 1.588 m (5' 2.52\")   Wt 72.6 kg (160 lb)   BMI 28.78 kg/m    BMI= Body mass index is 28.78 kg/m .  Constitutional: healthy, alert and no acute distress   Psychiatric: mentation appears normal and affect normal/bright  NEURO: no focal deficits  SKIN: .well healed, no erythema, no incision breakdown and no drainage.  JOINT/EXTREMITIES: If this as expected.  Fingers are warm and dry good cap refill.  Distal neurovascular is grossly intact  GAIT: not tested     Diagnostic Modalities:  left wrist X-ray: Distal radius fracture with decreased lucency and callus formation.  Plate and screws in place no evidence of position change or alignment.  Small ulnar styloid fracture still noted  Independent visualization of the images was performed.      Impression:   Chief Complaint   Patient presents with     RECHECK     left wrist follow up- DOI: 1/21/2019     Surgical Followup     DOS: 2/1/2019~Left distal radius open reduction and internal fixation~5 weeks postop   Doing is as expected.-Some stiffness    Plan:   Activity: No working on range of motion  Staples/Sutures out: Not applicable.  Pain controlled: Yes. Continue to use: Nothing  Immobilzation: Yes, Wear brace when outside May remove at home and at night  Physical Therapy: decrease swelling and inflammation, passive range of motion, active range of motion  Rest, Ice  Return to clinic 4, week(s), or sooner as needed for changes.    Re-x-ray on return: Yes.    Maximino Andrews D.O.  "

## 2019-03-06 NOTE — LETTER
3/6/2019         RE: Love Kaiser  [address] [facility] [address]        Dear Colleague,    Thank you for referring your patient, Love Kaiser, to the [facility]. Please see a copy of my visit note below.    Orthopedic Clinic Post-Operative Note    CHIEF COMPLAINT:   Chief Complaint   Patient presents with     RECHECK     left wrist follow up- DOI: 1/21/2019     Surgical Followup     DOS: 2/1/2019~Left distal radius open reduction and internal fixation~5 weeks postop       HISTORY OF PRESENT ILLNESS  No pain.  Numbness and tingling much better just some very minimal tingling in the thumb and index finger.  Wearing her brace    Patient's past medical, surgical, social and family histories reviewed.     Past Medical History:   Diagnosis Date     Acute alcoholic hepatitis      Acute pancreatitis 08/07/06    aDMIT. dISCHARGED 08/10/06     Cancer of parotid gland (H) 1974     Chronic airway obstruction, not elsewhere classified      COPD (chronic obstructive pulmonary disease) (H) 3/30/2009     Diarrhea      Hyposmolality and/or hyponatremia      Other and unspecified alcohol dependence, continuous drinking behavior      Other chronic nonalcoholic liver disease      Unspecified protein-calorie malnutrition (H)        Past Surgical History:   Procedure Laterality Date     ARTHROPLASTY SHOULDER Right 5/26/2015    Procedure: ARTHROPLASTY SHOULDER;  Surgeon: Ashutosh Andrews DO;  [facility]     C REMOVAL OF OVARY(S)       CL AFF SURGICAL PATHOLOGY  1974    parotid tumor with malignancy     COLONOSCOPY  09/21/09     COLONOSCOPY N/A 7/6/2016    Procedure: COMBINED COLONOSCOPY, SINGLE OR MULTIPLE BIOPSY/POLYPECTOMY BY BIOPSY;  Surgeon: John Bellamy MD;  [facility]     EXCISE MASS FOOT  1/17/2012    Procedure:EXCISE MASS FOOT; Excision of Mass Right Foot; Surgeon:CARRIE PAGAN; [facility]     FOREIGN BODY REMOVAL  07/08/10    Soft tissue mass  w/peripheral metal fragments     HC COLONOSCOPY W BIOPSY  06     HC COLONOSCOPY W BIOPSY  10/31/07     HC UGI ENDOSCOPY DIAG W BIOPSY  06     HYSTERECTOMY, PAP NO LONGER INDICATED       HYSTERECTOMY, FIDE       OPEN REDUCTION INTERNAL FIXATION WRIST Left 2019    Procedure: Left distal radius open reduction and internal fixation;  Surgeon: Ashutosh Andrews DO;  [facility]       Medications:    Current Outpatient Medications on File Prior to Visit:  [] acetaminophen (TYLENOL) 325 MG tablet Take 3 tablets (975 mg) by mouth every 8 hours as needed for mild pain   aspirin 81 MG tablet Take 1 tablet (81 mg) by mouth daily   Atorvastatin Calcium (LIPITOR PO) Take 40 mg by mouth daily   buPROPion (WELLBUTRIN XL) 150 MG 24 hr tablet Take 1 tablet (150 mg) by mouth every morning   CARTIA  MG 24 hr capsule Take 180 mg by mouth daily   docusate sodium (COLACE) 100 MG capsule Take 1 capsule (100 mg) by mouth daily   NITROGLYCERIN SL Place 0.4 mg under the tongue Reported on 2017   [] oxyCODONE (ROXICODONE) 5 MG tablet Take 1-2 tablets (5-10 mg) by mouth every 4 hours as needed for moderate to severe pain   POTASSIUM 75 MG OR TABS 1 TABLET  DAILY   PROAIR  (90 Base) MCG/ACT inhaler INHALE TWO PUFFS BY MOUTH EVERY FOUR HOURS AS NEEDED    VITAMIN B-12 500 MCG OR TABS daily   VITAMIN C 500 MG OR TABS ONE TABLET DAILY   VITAMIN D-3 SUPER STRENGTH 2000 UNIT OR TABS daily     No current facility-administered medications on file prior to visit.     Allergies   Allergen Reactions     Nickel Itching     No Known Drug Allergies        Social History     Occupational History     Occupation: unemployed   Tobacco Use     Smoking status: Current Every Day Smoker     Packs/day: 0.50     Years: 29.00     Pack years: 14.50     Types: Cigarettes     Smokeless tobacco: Never Used   Substance and Sexual Activity     Alcohol use: No     Alcohol/week: 14.0 oz     Comment: quit drinking      "Drug use: No     Sexual activity: Yes     Partners: Male     Birth control/protection: None     Comment: not currently       Family History   Problem Relation Age of Onset     Arthritis Mother         RA     Cancer Mother         female organs     Cancer Father         colon       REVIEW OF SYSTEMS  General: negative for, night sweats, dizziness, fatigue  Resp: No shortness of breath and no cough  CV: negative for chest pain, syncope or near-syncope  GI: negative for nausea, vomiting and diarrhea  : negative for dysuria and hematuria  Musculoskeletal: as above  Neurologic: negative for syncope   Hematologic: negative for bleeding disorder    Physical Exam:  Vitals: /72   Ht 1.588 m (5' 2.52\")   Wt 72.6 kg (160 lb)   BMI 28.78 kg/m     BMI= Body mass index is 28.78 kg/m .  Constitutional: healthy, alert and no acute distress   Psychiatric: mentation appears normal and affect normal/bright  NEURO: no focal deficits  SKIN: .well healed, no erythema, no incision breakdown and no drainage.  JOINT/EXTREMITIES: If this as expected.  Fingers are warm and dry good cap refill.  Distal neurovascular is grossly intact  GAIT: not tested     Diagnostic Modalities:  left wrist X-ray: Distal radius fracture with decreased lucency and callus formation.  Plate and screws in place no evidence of position change or alignment.  Small ulnar styloid fracture still noted  Independent visualization of the images was performed.      Impression:   Chief Complaint   Patient presents with     RECHECK     left wrist follow up- DOI: 1/21/2019     Surgical Followup     DOS: 2/1/2019~Left distal radius open reduction and internal fixation~5 weeks postop   Doing is as expected.-Some stiffness    Plan:   Activity: No working on range of motion  Staples/Sutures out: Not applicable.  Pain controlled: Yes. Continue to use: Nothing  Immobilzation: Yes, Wear brace when outside May remove at home and at night  Physical Therapy: decrease swelling " and inflammation, passive range of motion, active range of motion  Rest, Ice  Return to clinic 4, week(s), or sooner as needed for changes.    Re-x-ray on return: Yes.    Maximino Andrews D.O.    Again, thank you for allowing me to participate in the care of your patient.        Sincerely,        Ashutosh Andrews, DO

## 2019-03-13 ENCOUNTER — THERAPY VISIT (OUTPATIENT)
Dept: OCCUPATIONAL THERAPY | Facility: CLINIC | Age: 69
End: 2019-03-13
Attending: ORTHOPAEDIC SURGERY
Payer: COMMERCIAL

## 2019-03-13 DIAGNOSIS — M25.532 LEFT WRIST PAIN: ICD-10-CM

## 2019-03-13 DIAGNOSIS — S52.532A CLOSED COLLES' FRACTURE OF LEFT RADIUS, INITIAL ENCOUNTER: ICD-10-CM

## 2019-03-13 PROCEDURE — G8985 CARRY GOAL STATUS: HCPCS | Mod: GO | Performed by: OCCUPATIONAL THERAPIST

## 2019-03-13 PROCEDURE — G8984 CARRY CURRENT STATUS: HCPCS | Mod: GO | Performed by: OCCUPATIONAL THERAPIST

## 2019-03-13 PROCEDURE — 97110 THERAPEUTIC EXERCISES: CPT | Mod: GO | Performed by: OCCUPATIONAL THERAPIST

## 2019-03-13 PROCEDURE — 97165 OT EVAL LOW COMPLEX 30 MIN: CPT | Mod: GO | Performed by: OCCUPATIONAL THERAPIST

## 2019-03-13 PROCEDURE — 97140 MANUAL THERAPY 1/> REGIONS: CPT | Mod: GO | Performed by: OCCUPATIONAL THERAPIST

## 2019-03-13 NOTE — LETTER
[facility]  [address]  [facility] [address]  [phone]    2019    Re: Love Kaiser   :   1950  MRN:  [id number]   REFERRING PHYSICIAN:   Ashutosh Andrews    [facility]    Date of Initial Evaluation:  ***  Visits:  Rxs Used: 1  Reason for Referral:     Closed Colles' fracture of left radius, initial encounter  Left wrist pain    EVALUATION SUMMARY      Hand Therapy Initial Evaluation    Current Date:  3/13/2019  Referring Physician: Dr. Andrews    Subjective:  Love Kaiser is a 68 year old right hand dominant female.    Diagnosis:Left Distal Radius Fracture  DOI:  19  DOS:  19  Procedure: ORIF  Post: 5w 5d    Patient reports symptoms of pain, stiffness/loss of motion, weakness/loss of strength, edema, numbness and tingling  of the left wrist and hand  which occurred due to a fall on the ice. Since onset symptoms are gradually getting better.  Special tests:  x-rays.  Previous treatment: surgery and brace.    General health as reported by patient is good.  Pertinent medical history includes:Cancer, High Blood Pressure, History of Fractures, Osteoporosis, Smoking. Macular Degenration  Medical allergies:nickel.  Surgical history: cancer: neck, orthopedic: shoulder and hand.  Medication history: Anti-inflammatory, High Blood Pressure, Pain.    Occupational Profile Information:  Current occupation is retired  Barriers include:transportation, live alone, legally blind  Prior functional level:  no limitations  Mobility: No difficulty  Transportation: [employer] and family  Leisure activities/hobbies: playing organ, cat    Upper Extremity Functional Index Score:  SCORE:   Column Totals: /80: 56   (A lower score indicates greater disability.)    Objective:  Pain:    VAS(0-10) 3/13/19   At Rest:  2/10   With Use:  6/10     Report of Pain:  Location: left wrist and forearm  Description: Ache,  Sharp,  Radiates into  wrist and forearm  Frequency: Intermittent  Duration:  Worse during the day  Exacerbated by: Lifting, gripping, twisting,  pulling, activity, reaching  Relieved by: ice, rest, splints  Progression:  gradually improving    ROM:  Right  Left        Wrist AROM  (PROM) 3/13/19        60 Ext 60        65 Flex 60        20 RD 25        55 UD 38+        80 Sup 80        90 Pron 80          Strength:   (Measured in pounds)   3/13/19      Trials Right Left Right Left Right Left  Right Left   1  2  3  Av  67  65  65 39  33  35  36           Lat Pinch 3/13/19      Trials Right Left Right Left Right Left  Right Left   1  2  3  Av 10           3 Pt.  Pinch 3/13/19      Trials Right Left Right Left Right Left  Right Left   1  2  3  Av 10           Edema (circumference)   Distal Wrist Crease 3/13/19                Right 17.3                 Left 18.6          Edema:  []     None   [x]     Mild    []     Moderate      []     Severe                  Location: (L) wrist     Color/Temperature:     [x]    Normal  []    Abnorma     Scar:  []    NA           [x]  Adherent      []   Non-adherent       []   Raised     []   Flattened              Palpation:  []     Normal        [x]   Tender       [x]  Mild         []   Moderate []   Severe     Location: (L) volar wrist and scar area    Sensation: []               WNL throughout all nerve distributions; per patient report    [x]               Decreased  []    Median    []    Ulnar    []    Radial nerve distribution  Patient reports numbness and tingling in the thumb and IF    Assessment:  Patient presents with symptoms consistent with diagnosis of distal radius fracture, with surgical intervention.     Patient's limitations or Problem List includes:  Pain, Decreased ROM/motion, Weakness, Decreased  and pinch strength of the left wrist which interferes with the patient's ability to perform Household Chores as compared to previous level of function.    Rehab Potential:   Excellent - Return to full activity, no limitations    Patient will benefit from skilled Occupational Therapy to increase wrist and forearm ROM, flexibility,  strength and pinch strength and decrease pain to return to previous activity level and resume normal daily tasks and to reach their rehab potential.    Barriers to Learning:  No barrier    Communication Issues:  Patient appears to be able to clearly communicate and understand verbal and written communication and follow directions correctly.    Chart Review: Brief history including review of medical and/or therapy records relating to the presenting problem and Simple history review with patient    Identified Performance Deficits: home establishment and management and leisure activities    Assessment of Occupational Performance:  1-3 Performance Deficits    Clinical Decision Making (Complexity): Low complexity    Treatment Explanation:  The following has been discussed with the patient:    RX ordered/plan of care  Anticipated outcomes  Possible risks and side effects    Frequency:  1 X week, once daily  Duration:  for 8 weeks    Treatment Plan:    Modalities:    Fluidotherapy and Paraffin   Therapeutic Exercise:   AROM of finger, thumb, wrist, forearm, and elbow  PROM of wrist E/F and P/S   Progress to  and Pinch strengthening  Progress to Wrist Isotonic strengthening  Manual Techniques:   Scar mobilization  Joint mobilization techniques   Manual Edema Mobilization   K tape      Discharge Plan:    Achieve all LTG.  Independent in home treatment program.  Reach maximal therapeutic benefit.    Home Program:   Warmth/Ice  Scar mobilization  AROM of wrist and forearm all planes  PROM for wrist E/F and UD/RD   strengthening  Forearm based wrist orthosis PRN only  Avoid activities that exacerbate pain     Next Visit:   A/PROM of wrist   and wrist strengthening    Please see the documentation flowsheet for the details of todays treatment session and the  Goal flowsheet for goal details.      Thank you for your referral.    INQUIRIES  Therapist:    [facility]  [address] [facility] [address]  [facility] [address]  Phone: [phone]  Fax: [phone]

## 2019-03-13 NOTE — PROGRESS NOTES
Hand Therapy Initial Evaluation    Current Date:  3/13/2019  Referring Physician: Dr. Andrews    Subjective:  Love Kaiser is a 68 year old right hand dominant female.    Diagnosis:Left Distal Radius Fracture  DOI:  1/21/19  DOS:  2/1/19  Procedure: ORIF  Post: 5w 5d    Patient reports symptoms of pain, stiffness/loss of motion, weakness/loss of strength, edema, numbness and tingling  of the left wrist and hand  which occurred due to a fall on the ice. Since onset symptoms are gradually getting better.  Special tests:  x-rays.  Previous treatment: surgery and brace.    General health as reported by patient is good.  Pertinent medical history includes:Cancer, High Blood Pressure, History of Fractures, Osteoporosis, Smoking. Macular Degenration  Medical allergies:nickel.  Surgical history: cancer: neck, orthopedic: shoulder and hand.  Medication history: Anti-inflammatory, High Blood Pressure, Pain.    Occupational Profile Information:  Current occupation is retired  Barriers include:transportation, live alone, legally blind  Prior functional level:  no limitations  Mobility: No difficulty  Transportation: [employer] and family  Leisure activities/hobbies: playing organ, cat    Upper Extremity Functional Index Score:  SCORE:   Column Totals: /80: 56   (A lower score indicates greater disability.)    Objective:  Pain:    VAS(0-10) 3/13/19   At Rest:  2/10   With Use:  6/10     Report of Pain:  Location: left wrist and forearm  Description: Ache,  Sharp,  Radiates into wrist and forearm  Frequency: Intermittent  Duration:  Worse during the day  Exacerbated by: Lifting, gripping, twisting,  pulling, activity, reaching  Relieved by: ice, rest, splints  Progression:  gradually improving    ROM:  Right  Left        Wrist AROM  (PROM) 3/13/19        60 Ext 60        65 Flex 60        20 RD 25        55 UD 38+        80 Sup 80        90 Pron 80          Strength:   (Measured in pounds)   3/13/19       Trials Right Left Right Left Right Left  Right Left   1  2  3  Av  67  65  65 39  33  35  36           Lat Pinch 3/13/19      Trials Right Left Right Left Right Left  Right Left   1  2  3  Av 10           3 Pt.  Pinch 3/13/19      Trials Right Left Right Left Right Left  Right Left   1  2  3  Av 10           Edema (circumference)   Distal Wrist Crease 3/13/19                Right 17.3                 Left 18.6          Edema:  []     None   [x]     Mild    []     Moderate      []     Severe                  Location: (L) wrist     Color/Temperature:     [x]    Normal  []    Abnorma     Scar:  []    NA           [x]  Adherent      []   Non-adherent       []   Raised     []   Flattened              Palpation:  []     Normal        [x]   Tender       [x]  Mild         []   Moderate []   Severe     Location: (L) volar wrist and scar area    Sensation: []               WNL throughout all nerve distributions; per patient report    [x]               Decreased  []    Median    []    Ulnar    []    Radial nerve distribution  Patient reports numbness and tingling in the thumb and IF    Assessment:  Patient presents with symptoms consistent with diagnosis of distal radius fracture, with surgical intervention.     Patient's limitations or Problem List includes:  Pain, Decreased ROM/motion, Weakness, Decreased  and pinch strength of the left wrist which interferes with the patient's ability to perform Household Chores as compared to previous level of function.    Rehab Potential:  Excellent - Return to full activity, no limitations    Patient will benefit from skilled Occupational Therapy to increase wrist and forearm ROM, flexibility,  strength and pinch strength and decrease pain to return to previous activity level and resume normal daily tasks and to reach their rehab potential.    Barriers to Learning:  No barrier    Communication Issues:  Patient appears to be able to clearly communicate and  understand verbal and written communication and follow directions correctly.    Chart Review: Brief history including review of medical and/or therapy records relating to the presenting problem and Simple history review with patient    Identified Performance Deficits: home establishment and management and leisure activities    Assessment of Occupational Performance:  1-3 Performance Deficits    Clinical Decision Making (Complexity): Low complexity    Treatment Explanation:  The following has been discussed with the patient:    RX ordered/plan of care  Anticipated outcomes  Possible risks and side effects    Frequency:  1 X week, once daily  Duration:  for 8 weeks    Treatment Plan:    Modalities:    Fluidotherapy and Paraffin   Therapeutic Exercise:   AROM of finger, thumb, wrist, forearm, and elbow  PROM of wrist E/F and P/S   Progress to  and Pinch strengthening  Progress to Wrist Isotonic strengthening  Manual Techniques:   Scar mobilization  Joint mobilization techniques   Manual Edema Mobilization   K tape      Discharge Plan:    Achieve all LTG.  Independent in home treatment program.  Reach maximal therapeutic benefit.    Home Program:   Warmth/Ice  Scar mobilization  AROM of wrist and forearm all planes  PROM for wrist E/F and UD/RD   strengthening  Forearm based wrist orthosis PRN only  Avoid activities that exacerbate pain     Next Visit:   A/PROM of wrist   and wrist strengthening    Please see the documentation flowsheet for the details of todays treatment session and the Goal flowsheet for goal details.

## 2019-03-20 ENCOUNTER — THERAPY VISIT (OUTPATIENT)
Dept: OCCUPATIONAL THERAPY | Facility: CLINIC | Age: 69
End: 2019-03-20
Attending: ORTHOPAEDIC SURGERY
Payer: COMMERCIAL

## 2019-03-20 DIAGNOSIS — M25.532 LEFT WRIST PAIN: ICD-10-CM

## 2019-03-20 PROCEDURE — 97110 THERAPEUTIC EXERCISES: CPT | Mod: GO | Performed by: OCCUPATIONAL THERAPIST

## 2019-03-20 PROCEDURE — 97140 MANUAL THERAPY 1/> REGIONS: CPT | Mod: GO | Performed by: OCCUPATIONAL THERAPIST

## 2019-03-20 PROCEDURE — 97530 THERAPEUTIC ACTIVITIES: CPT | Mod: GO | Performed by: OCCUPATIONAL THERAPIST

## 2019-03-20 NOTE — PROGRESS NOTES
SOAP Note - Hand Therapy - Objective Information    Current Date:  3/20/2019  Referring Physician: Dr. Darryl Aleman IMANI Kaiser is a 68 year old right hand dominant female.    Diagnosis:Left Distal Radius Fracture  DOI:  19  DOS:  19  Procedure: ORIF  Post: 6w 5d    Patient reports symptoms of pain, stiffness/loss of motion, weakness/loss of strength, edema, numbness and tingling  of the left wrist and hand  which occurred due to a fall on the ice.     Occupational Profile Information:  Current occupation is retired  S:  Subjective changes as noted by patient: My wrist was sore after the last visit but better now  Functional changes noted by patient: Using the hand to do laundry among other things      O:  Pain:    VAS(0-10) 3/13/19 3/20/19   At Rest:  2/10 010   With Use:  6/10 4/10     Report of Pain:  Location: left wrist and forearm  Description: Ache,  Sharp,  Radiates into wrist and forearm  Frequency: Intermittent  Duration:  Worse during the day  Exacerbated by: Lifting, gripping, twisting,  pulling, activity, reaching  Relieved by: ice, rest, splints  Progression:  gradually improving    ROM:  Right  Left Left       Wrist AROM  (PROM) 3/13/19 3/20/19       60 Ext 60 68       65 Flex 60 65       20 RD 25 25       55 UD 38+ 45       80 Sup 80 80       90 Pron 80 85         Strength:   (Measured in pounds)   3/13/19      Trials Right Left Right Left Right Left  Right Left   1  2  3  Av  67  65  65 39  33  35  36           Lat Pinch 3/13/19      Trials Right Left Right Left Right Left  Right Left   1  2  3  Av 10           3 Pt.  Pinch 3/13/19      Trials Right Left Right Left Right Left  Right Left   1  2  3  Av 10           Edema (circumference)   Distal Wrist Crease 3/13/19                Right 17.3                 Left 18.6          Edema:  []     None   [x]     Mild    []     Moderate      []     Severe                  Location: (L) wrist     Color/Temperature:     [x]     Normal  []    Abnorma     Scar:  []    NA           [x]  Adherent      []   Non-adherent       []   Raised     []   Flattened              Palpation:  []     Normal        [x]   Tender       [x]  Mild         []   Moderate []   Severe     Location: (L) volar wrist and scar area    Sensation: []               WNL throughout all nerve distributions; per patient report    [x]               Decreased  []    Median    []    Ulnar    []    Radial nerve distribution  Patient reports numbness and tingling in the thumb and IF      Please refer to the daily flowsheet for treatment provided today.     Home Program:   Warmth/Ice  Scar mobilization  AROM of wrist and forearm all planes  PROM for wrist E/F and UD/RD   strengthening  Forearm based wrist orthosis PRN only  Avoid activities that exacerbate pain     Next Visit:   A/PROM of wrist   and wrist strengthening

## 2019-03-27 ENCOUNTER — THERAPY VISIT (OUTPATIENT)
Dept: OCCUPATIONAL THERAPY | Facility: CLINIC | Age: 69
End: 2019-03-27
Attending: ORTHOPAEDIC SURGERY
Payer: COMMERCIAL

## 2019-03-27 DIAGNOSIS — M25.532 LEFT WRIST PAIN: ICD-10-CM

## 2019-03-27 PROCEDURE — 97110 THERAPEUTIC EXERCISES: CPT | Mod: GO | Performed by: OCCUPATIONAL THERAPIST

## 2019-03-27 PROCEDURE — 97140 MANUAL THERAPY 1/> REGIONS: CPT | Mod: GO | Performed by: OCCUPATIONAL THERAPIST

## 2019-03-27 NOTE — PROGRESS NOTES
SOAP Note - Hand Therapy - Objective Information    Current Date:  3/27/2019  Referring Physician: Dr. Darryl Aleman IMANI Kaiser is a 68 year old right hand dominant female.    Diagnosis:Left Distal Radius Fracture  DOI:  19  DOS:  19  Procedure: ORIF  Post: 7w 5d    Patient reports symptoms of pain, stiffness/loss of motion, weakness/loss of strength, edema, numbness and tingling  of the left wrist and hand  which occurred due to a fall on the ice.     Occupational Profile Information:  Current occupation is retired    S:  Subjective changes as noted by patient: I have a constant pain in the wrist these last two days  Functional changes noted by patient: Using the hand more without the brace  O:  Pain:    VAS(0-10) 3/13/19 3/20/19 3/27/19   At Rest:  2/10 010 2/10   With Use:  6/10 4/10 4/10     Report of Pain:  Location: left wrist and forearm  Description: Ache,  Sharp,  Radiates into wrist and forearm  Frequency: Intermittent  Duration:  Worse during the day  Exacerbated by: Lifting, gripping, twisting,  pulling, activity, reaching  Relieved by: ice, rest, splints  Progression:  gradually improving    ROM:  Right  Left Left Left      Wrist AROM  (PROM) 3/13/19 3/20/19 3/27/19      60 Ext 60 68 70      65 Flex 60 65 68      20 RD 25 25 25      55 UD 38+ 45 45      80 Sup 80 80       90 Pron 80 85         Strength:   (Measured in pounds)   3/13/19      Trials Right Left Right Left Right Left  Right Left   1  2  3  Av  67  65  65 39  33  35  36           Lat Pinch 3/13/19      Trials Right Left Right Left Right Left  Right Left   1  2  3  Av 10           3 Pt.  Pinch 3/13/19      Trials Right Left Right Left Right Left  Right Left   1  2  3  Av 10           Edema (circumference)   Distal Wrist Crease 3/13/19                Right 17.3                 Left 18.6          Edema:  []     None   [x]     Mild    []     Moderate      []     Severe                  Location: (L) wrist       Scar:  []    NA           [x]  Adherent      []   Non-adherent       []   Raised     []   Flattened              Palpation:  []     Normal        [x]   Tender       [x]  Mild         []   Moderate []   Severe     Location: (L) volar wrist and scar area    Sensation: []               WNL throughout all nerve distributions; per patient report    [x]               Decreased  []    Median    []    Ulnar    []    Radial nerve distribution  Patient reports numbness and tingling in the thumb and IF      Please refer to the daily flowsheet for treatment provided today.     Home Program:   Warmth/Ice  Scar mobilization  AROM of wrist and forearm all planes  PROM for wrist E/F and UD/RD   strengthening  Forearm based wrist orthosis PRN only  Avoid activities that exacerbate pain   Wrist PREs    Next Visit:   A/PROM of wrist   and wrist strengthening

## 2019-04-03 ENCOUNTER — THERAPY VISIT (OUTPATIENT)
Dept: OCCUPATIONAL THERAPY | Facility: CLINIC | Age: 69
End: 2019-04-03
Payer: COMMERCIAL

## 2019-04-03 DIAGNOSIS — M25.532 LEFT WRIST PAIN: ICD-10-CM

## 2019-04-03 PROCEDURE — 97530 THERAPEUTIC ACTIVITIES: CPT | Mod: GO | Performed by: OCCUPATIONAL THERAPIST

## 2019-04-03 PROCEDURE — 97110 THERAPEUTIC EXERCISES: CPT | Mod: GO | Performed by: OCCUPATIONAL THERAPIST

## 2019-04-03 NOTE — PROGRESS NOTES
Progress Note - Hand Therapy     Current Date:  3/27/2019  Reporting Period: 3/13/19 to 4/3/19  Referring Physician: Dr. Darryl Cabraleschris is a 68 year old right hand dominant female.    Diagnosis:Left Distal Radius Fracture  DOI:  19  DOS:  19  Procedure: ORIF  Post: 8w 5d    Patient reports symptoms of pain, stiffness/loss of motion, weakness/loss of strength, edema, numbness and tingling  of the left wrist and hand  which occurred due to a fall on the ice.     Occupational Profile Information:  Current occupation is retired    S:  Subjective changes as noted by patient:The wrist is doing good.  I have been doing my strengthening exercises.  Functional changes noted by patient: Less difficulty with most household tasks    Upper Extremity Functional Index Score:  SCORE:   Column Totals: /80: 70   (A lower score indicates greater disability.)      O:  Pain:    VAS(0-10) 3/13/19 3/20/19 3/27/19 4/3/19   At Rest:  2/10 010 2/10 010   With Use:  6/10 4/10 4/10 3/10     Report of Pain:  Location: left wrist and forearm  Description: Ache,  Sharp,  Radiates into wrist and forearm  Frequency: Intermittent  Duration:  Worse during the day  Exacerbated by: Lifting, gripping, twisting,  pulling, activity, reaching  Relieved by: ice, rest, splints  Progression:  gradually improving    ROM:  Right  Left Left Left      Wrist AROM  (PROM) 3/13/19 3/20/19 3/27/19      60 Ext 60 68 70      65 Flex 60 65 68      20 RD 25 25 25      55 UD 38+ 45 45      80 Sup 80 80       90 Pron 80 85         Strength:   (Measured in pounds)   3/13/19 4/3/19     Trials Right Left Right Left Right Left  Right Left   1  2  3  Av  67  65  65 39  33  35  36  37  33  34  35         Lat Pinch 3/13/19      Trials Right Left Right Left Right Left  Right Left   1  2  3  Av 10           3 Pt.  Pinch 3/13/19      Trials Right Left Right Left Right Left  Right Left   1  2  3  Av 10           Edema (circumference)    Distal Wrist Crease 3/13/19                Right 17.3                 Left 18.6          Edema:  []     None   [x]     Mild    []     Moderate      []     Severe                  Location: (L) wrist      Scar:  []    NA           [x]  Adherent      []   Non-adherent       []   Raised     []   Flattened              Palpation:  []     Normal        [x]   Tender       [x]  Mild         []   Moderate []   Severe     Location: (L) volar wrist and scar area    Sensation: [x]               WNL throughout all nerve distributions; per patient report    []               Decreased  []    Median    []    Ulnar    []    Radial nerve distribution    Please refer to the daily flowsheet for treatment provided today.     Assessment:  Response to therapy has been improvement to:  ROM of Wrist:  All Planes  Flexibility:  less tightness in involved muscles  Strength:   and pinch  Pain:  frequency is less, intensity of pain is decreased, duration of pain is decreased and less tender over affected area  Paresthesias:  Median nerve - less intense numbness and tingling    Overall Assessment:  Patient's symptoms are resolving.  Patient is progressing well and is ready to decrease frequency of treatment in the clinic.  Patient is ready to progress to more complex exercises.  Patient is becoming more independent in home exercise program  STG/LTG:  STGoals have been reviewed and progress or achievement has occurred;  see goal sheet for details and updates.    I have re-evaluated this patient and find that the nature, scope, duration and intensity of the therapy is appropriate for the medical condition of the patient.  Plan:  Frequency/Duration:  Recommend continuing with the current treatment plan. 1 X week, once daily  for 4 weeks    Recommendations for Continued Therapy  Additions to Treatment Plan -  Therapeutic Exercise:  Isotonics      Home Program:   Warmth/Ice  Scar mobilization  AROM of wrist and forearm all planes  PROM for  wrist E/F and UD/RD   strengthening  Forearm based wrist orthosis PRN only  Avoid activities that exacerbate pain   Wrist PREs    Next Visit:   No further visits scheduled at this time. The chart will be left open for one month to allow patient to schedule further appointments if problems develop. If no additional therapy sessions are scheduled, then the patient will be discharged and this will serve as a discharge note.

## 2019-04-11 ENCOUNTER — OFFICE VISIT (OUTPATIENT)
Dept: ORTHOPEDICS | Facility: OTHER | Age: 69
End: 2019-04-11
Payer: COMMERCIAL

## 2019-04-11 ENCOUNTER — ANCILLARY PROCEDURE (OUTPATIENT)
Dept: GENERAL RADIOLOGY | Facility: OTHER | Age: 69
End: 2019-04-11
Attending: ORTHOPAEDIC SURGERY
Payer: COMMERCIAL

## 2019-04-11 VITALS
WEIGHT: 160 LBS | HEIGHT: 63 IN | DIASTOLIC BLOOD PRESSURE: 80 MMHG | SYSTOLIC BLOOD PRESSURE: 160 MMHG | BODY MASS INDEX: 28.35 KG/M2

## 2019-04-11 DIAGNOSIS — S52.532A CLOSED COLLES' FRACTURE OF LEFT RADIUS, INITIAL ENCOUNTER: Primary | ICD-10-CM

## 2019-04-11 DIAGNOSIS — S52.532A CLOSED COLLES' FRACTURE OF LEFT RADIUS, INITIAL ENCOUNTER: ICD-10-CM

## 2019-04-11 PROCEDURE — 73110 X-RAY EXAM OF WRIST: CPT | Mod: LT

## 2019-04-11 PROCEDURE — 99024 POSTOP FOLLOW-UP VISIT: CPT | Performed by: ORTHOPAEDIC SURGERY

## 2019-04-11 ASSESSMENT — MIFFLIN-ST. JEOR: SCORE: 1221.95

## 2019-04-11 NOTE — PROGRESS NOTES
Orthopedic Clinic Post-Operative Note    CHIEF COMPLAINT:   Chief Complaint   Patient presents with     RECHECK     left wrist follow up- DOI: 1/21/2019     Surgical Followup     DOS: 2/1/2019~Left distal radius open reduction and internal fixation~10  weeks postop       HISTORY OF PRESENT ILLNESS  Doing well.  No issues.  Very happy.  Patient's past medical, surgical, social and family histories reviewed.     Past Medical History:   Diagnosis Date     Acute alcoholic hepatitis      Acute pancreatitis 08/07/06    aDMIT. dISCHARGED 08/10/06     Cancer of parotid gland (H) 1974     Chronic airway obstruction, not elsewhere classified      COPD (chronic obstructive pulmonary disease) (H) 3/30/2009     Diarrhea      Hyposmolality and/or hyponatremia      Other and unspecified alcohol dependence, continuous drinking behavior      Other chronic nonalcoholic liver disease      Unspecified protein-calorie malnutrition (H)        Past Surgical History:   Procedure Laterality Date     ARTHROPLASTY SHOULDER Right 5/26/2015    Procedure: ARTHROPLASTY SHOULDER;  Surgeon: Ashutosh Andrews DO;  [facility]: [facility]     C REMOVAL OF OVARY(S)       CL AFF SURGICAL PATHOLOGY  1974    parotid tumor with malignancy     COLONOSCOPY  09/21/09     COLONOSCOPY N/A 7/6/2016    Procedure: COMBINED COLONOSCOPY, SINGLE OR MULTIPLE BIOPSY/POLYPECTOMY BY BIOPSY;  Surgeon: John Bellamy MD;  [facility]: [facility]     EXCISE MASS FOOT  1/17/2012    Procedure:EXCISE MASS FOOT; Excision of Mass Right Foot; Surgeon:CARRIE PAGAN; Location:[facility]     FOREIGN BODY REMOVAL  07/08/10    Soft tissue mass w/peripheral metal fragments     HC COLONOSCOPY W BIOPSY  11/22/06     HC COLONOSCOPY W BIOPSY  10/31/07     HC UGI ENDOSCOPY DIAG W BIOPSY  11/22/06     HYSTERECTOMY, PAP NO LONGER INDICATED       HYSTERECTOMY, FIDE       OPEN REDUCTION INTERNAL FIXATION WRIST Left 2/1/2019    Procedure: Left distal radius open reduction and internal  fixation;  Surgeon: Ashutosh Andrews DO;  [facility]       Medications:    Current Outpatient Medications on File Prior to Visit:  [] acetaminophen (TYLENOL) 325 MG tablet Take 3 tablets (975 mg) by mouth every 8 hours as needed for mild pain   aspirin 81 MG tablet Take 1 tablet (81 mg) by mouth daily   Atorvastatin Calcium (LIPITOR PO) Take 40 mg by mouth daily   buPROPion (WELLBUTRIN XL) 150 MG 24 hr tablet Take 1 tablet (150 mg) by mouth every morning   CARTIA  MG 24 hr capsule Take 180 mg by mouth daily   docusate sodium (COLACE) 100 MG capsule Take 1 capsule (100 mg) by mouth daily   NITROGLYCERIN SL Place 0.4 mg under the tongue Reported on 2017   [] oxyCODONE (ROXICODONE) 5 MG tablet Take 1-2 tablets (5-10 mg) by mouth every 4 hours as needed for moderate to severe pain   POTASSIUM 75 MG OR TABS 1 TABLET  DAILY   PROAIR  (90 Base) MCG/ACT inhaler INHALE TWO PUFFS BY MOUTH EVERY FOUR HOURS AS NEEDED    VITAMIN B-12 500 MCG OR TABS daily   VITAMIN C 500 MG OR TABS ONE TABLET DAILY   VITAMIN D-3 SUPER STRENGTH 2000 UNIT OR TABS daily     No current facility-administered medications on file prior to visit.     Allergies   Allergen Reactions     Nickel Itching     No Known Drug Allergies        Social History     Occupational History     Occupation: unemployed   Tobacco Use     Smoking status: Current Every Day Smoker     Packs/day: 0.50     Years: 29.00     Pack years: 14.50     Types: Cigarettes     Smokeless tobacco: Never Used   Substance and Sexual Activity     Alcohol use: No     Alcohol/week: 14.0 oz     Comment: quit drinking     Drug use: No     Sexual activity: Yes     Partners: Male     Birth control/protection: None     Comment: not currently       Family History   Problem Relation Age of Onset     Arthritis Mother         RA     Cancer Mother         female organs     Cancer Father         colon       REVIEW OF SYSTEMS  General: negative for, night sweats,  "dizziness, fatigue  Resp: No shortness of breath and no cough  CV: negative for chest pain, syncope or near-syncope  GI: negative for nausea, vomiting and diarrhea  : negative for dysuria and hematuria  Musculoskeletal: as above  Neurologic: negative for syncope   Hematologic: negative for bleeding disorder    Physical Exam:  Vitals: /80   Ht 1.588 m (5' 2.5\")   Wt 72.6 kg (160 lb)   BMI 28.80 kg/m    BMI= Body mass index is 28.8 kg/m .  Constitutional: healthy, alert and no acute distress   Psychiatric: mentation appears normal and affect normal/bright  NEURO: no focal deficits  SKIN: .well healed, no erythema, no incision breakdown and no drainage.  JOINT/EXTREMITIES: Near full range of motion.  No focal areas of tenderness.  Distal neurovascular intact  GAIT: not tested     Diagnostic Modalities:  left wrist X-ray: Volar plate and screws in place.  No evidence of position change or loosening.  Increased callus formation with decreased lucency.  Independent visualization of the images was performed.      Impression:   Chief Complaint   Patient presents with     RECHECK     left wrist follow up- DOI: 1/21/2019     Surgical Followup     DOS: 2/1/2019~Left distal radius open reduction and internal fixation~10  weeks postop     Routine healing    Plan:   Activity: None, use as tolerated  Staples/Sutures out: Not applicable.  Pain controlled: Yes. Continue to use: Nothing  Immobilzation: No  Physical Therapy: continue/transition to home exercise routine.   Return to clinic PRN, or sooner as needed for changes.    Re-x-ray on return: No    Maximino Andrews D.O.  "

## 2019-04-11 NOTE — LETTER
4/11/2019         RE: Love Kaiser  [address] [facility] [address]        Dear Colleague,    Thank you for referring your patient, Love Kaiser, to the [facility]. Please see a copy of my visit note below.    Orthopedic Clinic Post-Operative Note    CHIEF COMPLAINT:   Chief Complaint   Patient presents with     RECHECK     left wrist follow up- DOI: 1/21/2019     Surgical Followup     DOS: 2/1/2019~Left distal radius open reduction and internal fixation~10  weeks postop       HISTORY OF PRESENT ILLNESS  Doing well.  No issues.  Very happy.  Patient's past medical, surgical, social and family histories reviewed.     Past Medical History:   Diagnosis Date     Acute alcoholic hepatitis      Acute pancreatitis 08/07/06    aDMIT. dISCHARGED 08/10/06     Cancer of parotid gland (H) 1974     Chronic airway obstruction, not elsewhere classified      COPD (chronic obstructive pulmonary disease) (H) 3/30/2009     Diarrhea      Hyposmolality and/or hyponatremia      Other and unspecified alcohol dependence, continuous drinking behavior      Other chronic nonalcoholic liver disease      Unspecified protein-calorie malnutrition (H)        Past Surgical History:   Procedure Laterality Date     ARTHROPLASTY SHOULDER Right 5/26/2015    Procedure: ARTHROPLASTY SHOULDER;  Surgeon: Ashutosh Andrews DO;  [facility]     C REMOVAL OF OVARY(S)       CL AFF SURGICAL PATHOLOGY  1974    parotid tumor with malignancy     COLONOSCOPY  09/21/09     COLONOSCOPY N/A 7/6/2016    Procedure: COMBINED COLONOSCOPY, SINGLE OR MULTIPLE BIOPSY/POLYPECTOMY BY BIOPSY;  Surgeon: John Bellamy MD;  [facility]     EXCISE MASS FOOT  1/17/2012    Procedure:EXCISE MASS FOOT; Excision of Mass Right Foot; Surgeon:CARRIE PAGAN; [facility]     FOREIGN BODY REMOVAL  07/08/10    Soft tissue mass w/peripheral metal fragments     HC COLONOSCOPY W BIOPSY  11/22/06     HC COLONOSCOPY W BIOPSY   10/31/07      UGI ENDOSCOPY DIAG W BIOPSY  06     HYSTERECTOMY, PAP NO LONGER INDICATED       HYSTERECTOMY, FIDE       OPEN REDUCTION INTERNAL FIXATION WRIST Left 2019    Procedure: Left distal radius open reduction and internal fixation;  Surgeon: Ashutosh Andrews DO;  Location:  OR       Medications:    Current Outpatient Medications on File Prior to Visit:  [] acetaminophen (TYLENOL) 325 MG tablet Take 3 tablets (975 mg) by mouth every 8 hours as needed for mild pain   aspirin 81 MG tablet Take 1 tablet (81 mg) by mouth daily   Atorvastatin Calcium (LIPITOR PO) Take 40 mg by mouth daily   buPROPion (WELLBUTRIN XL) 150 MG 24 hr tablet Take 1 tablet (150 mg) by mouth every morning   CARTIA  MG 24 hr capsule Take 180 mg by mouth daily   docusate sodium (COLACE) 100 MG capsule Take 1 capsule (100 mg) by mouth daily   NITROGLYCERIN SL Place 0.4 mg under the tongue Reported on 2017   [] oxyCODONE (ROXICODONE) 5 MG tablet Take 1-2 tablets (5-10 mg) by mouth every 4 hours as needed for moderate to severe pain   POTASSIUM 75 MG OR TABS 1 TABLET  DAILY   PROAIR  (90 Base) MCG/ACT inhaler INHALE TWO PUFFS BY MOUTH EVERY FOUR HOURS AS NEEDED    VITAMIN B-12 500 MCG OR TABS daily   VITAMIN C 500 MG OR TABS ONE TABLET DAILY   VITAMIN D-3 SUPER STRENGTH 2000 UNIT OR TABS daily     No current facility-administered medications on file prior to visit.     Allergies   Allergen Reactions     Nickel Itching     No Known Drug Allergies        Social History     Occupational History     Occupation: unemployed   Tobacco Use     Smoking status: Current Every Day Smoker     Packs/day: 0.50     Years: 29.00     Pack years: 14.50     Types: Cigarettes     Smokeless tobacco: Never Used   Substance and Sexual Activity     Alcohol use: No     Alcohol/week: 14.0 oz     Comment: quit drinking     Drug use: No     Sexual activity: Yes     Partners: Male     Birth control/protection: None      "Comment: not currently       Family History   Problem Relation Age of Onset     Arthritis Mother         RA     Cancer Mother         female organs     Cancer Father         colon       REVIEW OF SYSTEMS  General: negative for, night sweats, dizziness, fatigue  Resp: No shortness of breath and no cough  CV: negative for chest pain, syncope or near-syncope  GI: negative for nausea, vomiting and diarrhea  : negative for dysuria and hematuria  Musculoskeletal: as above  Neurologic: negative for syncope   Hematologic: negative for bleeding disorder    Physical Exam:  Vitals: /80   Ht 1.588 m (5' 2.5\")   Wt 72.6 kg (160 lb)   BMI 28.80 kg/m     BMI= Body mass index is 28.8 kg/m .  Constitutional: healthy, alert and no acute distress   Psychiatric: mentation appears normal and affect normal/bright  NEURO: no focal deficits  SKIN: .well healed, no erythema, no incision breakdown and no drainage.  JOINT/EXTREMITIES: Near full range of motion.  No focal areas of tenderness.  Distal neurovascular intact  GAIT: not tested     Diagnostic Modalities:  left wrist X-ray: Volar plate and screws in place.  No evidence of position change or loosening.  Increased callus formation with decreased lucency.  Independent visualization of the images was performed.      Impression:   Chief Complaint   Patient presents with     RECHECK     left wrist follow up- DOI: 1/21/2019     Surgical Followup     DOS: 2/1/2019~Left distal radius open reduction and internal fixation~10  weeks postop     Routine healing    Plan:   Activity: None, use as tolerated  Staples/Sutures out: Not applicable.  Pain controlled: Yes. Continue to use: Nothing  Immobilzation: No  Physical Therapy: continue/transition to home exercise routine.   Return to clinic PRN, or sooner as needed for changes.    Re-x-ray on return: No    Maximino Andrews D.O.    Again, thank you for allowing me to participate in the care of your patient.        Sincerely,        Ashutosh" Luis F Andrews, DO

## 2019-06-05 PROBLEM — M25.532 LEFT WRIST PAIN: Status: RESOLVED | Noted: 2019-03-13 | Resolved: 2019-06-05

## 2019-06-06 ENCOUNTER — TRANSFERRED RECORDS (OUTPATIENT)
Dept: HEALTH INFORMATION MANAGEMENT | Facility: CLINIC | Age: 69
End: 2019-06-06

## 2019-07-11 ENCOUNTER — TELEPHONE (OUTPATIENT)
Dept: FAMILY MEDICINE | Facility: OTHER | Age: 69
End: 2019-07-11

## 2019-07-11 DIAGNOSIS — D36.9 ADENOMATOUS POLYP: Primary | ICD-10-CM

## 2019-07-11 NOTE — TELEPHONE ENCOUNTER
Reason for Call: Request for an order or referral:    Order or referral being requested: colonoscopy     Date needed: at your convenience    Has the patient been seen by the PCP for this problem? yes    Additional comments: patient would like to do this at [facility]    Phone number Patient can be reached at:  Home number on file [phone] (home)    Best Time:  any    Can we leave a detailed message on this number?  YES    Call taken on 7/11/2019 at 1:49 PM by Jeannette Hamilton

## 2019-07-12 ENCOUNTER — TELEPHONE (OUTPATIENT)
Dept: FAMILY MEDICINE | Facility: OTHER | Age: 69
End: 2019-07-12

## 2019-07-12 NOTE — TELEPHONE ENCOUNTER
Left message for patient to return call to schedule EGD/colonoscopy. If Rachel or Aliya are not available, please transfer to same day surgery

## 2019-07-12 NOTE — LETTER

## 2019-07-12 NOTE — LETTER
[facility]  [address]  [place] [address]  [phone]        July 15, 2019    Love Kaiser  [address] [place] [id number]

## 2019-07-15 NOTE — TELEPHONE ENCOUNTER
Date of colonoscopy/EGD: 7/24/19  Surgeon: Dr. Jean  Prep:Miralax  Packet:Colonoscopy/EGD instructions mailed to patient's home address.   Date: 7/15/2019      Surgery Scheduler

## 2019-07-23 ENCOUNTER — ANESTHESIA EVENT (OUTPATIENT)
Dept: GASTROENTEROLOGY | Facility: CLINIC | Age: 69
End: 2019-07-23
Payer: COMMERCIAL

## 2019-07-23 ASSESSMENT — ENCOUNTER SYMPTOMS: SEIZURES: 0

## 2019-07-23 ASSESSMENT — COPD QUESTIONNAIRES
CAT_SEVERITY: MODERATE
COPD: 1

## 2019-07-24 ENCOUNTER — ANESTHESIA (OUTPATIENT)
Dept: GASTROENTEROLOGY | Facility: CLINIC | Age: 69
End: 2019-07-24
Payer: COMMERCIAL

## 2019-07-24 ENCOUNTER — HOSPITAL ENCOUNTER (OUTPATIENT)
Facility: CLINIC | Age: 69
Discharge: HOME | End: 2019-07-24
Attending: SURGERY | Admitting: SURGERY
Payer: COMMERCIAL

## 2019-07-24 VITALS
OXYGEN SATURATION: 94 % | TEMPERATURE: 97.8 F | RESPIRATION RATE: 20 BRPM | HEART RATE: 72 BPM | DIASTOLIC BLOOD PRESSURE: 81 MMHG | SYSTOLIC BLOOD PRESSURE: 166 MMHG

## 2019-07-24 LAB — COLONOSCOPY: NORMAL

## 2019-07-24 PROCEDURE — 25800030 ZZH RX IP 258 OP 636: Performed by: SURGERY

## 2019-07-24 PROCEDURE — G0105 COLORECTAL SCRN; HI RISK IND: HCPCS | Performed by: SURGERY

## 2019-07-24 PROCEDURE — 25000125 ZZHC RX 250: Performed by: NURSE ANESTHETIST, CERTIFIED REGISTERED

## 2019-07-24 PROCEDURE — 37000008 ZZH ANESTHESIA TECHNICAL FEE, 1ST 30 MIN: Performed by: SURGERY

## 2019-07-24 PROCEDURE — 25000125 ZZHC RX 250: Performed by: SURGERY

## 2019-07-24 PROCEDURE — 37000009 ZZH ANESTHESIA TECHNICAL FEE, EACH ADDTL 15 MIN: Performed by: SURGERY

## 2019-07-24 PROCEDURE — 45378 DIAGNOSTIC COLONOSCOPY: CPT | Performed by: SURGERY

## 2019-07-24 PROCEDURE — 25000128 H RX IP 250 OP 636: Performed by: NURSE ANESTHETIST, CERTIFIED REGISTERED

## 2019-07-24 RX ORDER — MEPERIDINE HYDROCHLORIDE 25 MG/ML
12.5 INJECTION INTRAMUSCULAR; INTRAVENOUS; SUBCUTANEOUS
Status: DISCONTINUED | OUTPATIENT
Start: 2019-07-24 | End: 2019-07-24 | Stop reason: HOSPADM

## 2019-07-24 RX ORDER — LIDOCAINE HYDROCHLORIDE 10 MG/ML
1 INJECTION, SOLUTION EPIDURAL; INFILTRATION; INTRACAUDAL; PERINEURAL ONCE
Status: COMPLETED | OUTPATIENT
Start: 2019-07-24 | End: 2019-07-24

## 2019-07-24 RX ORDER — SODIUM CHLORIDE, SODIUM LACTATE, POTASSIUM CHLORIDE, CALCIUM CHLORIDE 600; 310; 30; 20 MG/100ML; MG/100ML; MG/100ML; MG/100ML
INJECTION, SOLUTION INTRAVENOUS CONTINUOUS
Status: DISCONTINUED | OUTPATIENT
Start: 2019-07-24 | End: 2019-07-24 | Stop reason: HOSPADM

## 2019-07-24 RX ORDER — PROPOFOL 10 MG/ML
INJECTION, EMULSION INTRAVENOUS PRN
Status: DISCONTINUED | OUTPATIENT
Start: 2019-07-24 | End: 2019-07-24

## 2019-07-24 RX ORDER — ONDANSETRON 4 MG/1
4 TABLET, ORALLY DISINTEGRATING ORAL EVERY 30 MIN PRN
Status: DISCONTINUED | OUTPATIENT
Start: 2019-07-24 | End: 2019-07-24 | Stop reason: HOSPADM

## 2019-07-24 RX ORDER — NALOXONE HYDROCHLORIDE 0.4 MG/ML
.1-.4 INJECTION, SOLUTION INTRAMUSCULAR; INTRAVENOUS; SUBCUTANEOUS
Status: DISCONTINUED | OUTPATIENT
Start: 2019-07-24 | End: 2019-07-24 | Stop reason: HOSPADM

## 2019-07-24 RX ORDER — LIDOCAINE HYDROCHLORIDE 20 MG/ML
INJECTION, SOLUTION INFILTRATION; PERINEURAL PRN
Status: DISCONTINUED | OUTPATIENT
Start: 2019-07-24 | End: 2019-07-24

## 2019-07-24 RX ORDER — SODIUM CHLORIDE, SODIUM LACTATE, POTASSIUM CHLORIDE, CALCIUM CHLORIDE 600; 310; 30; 20 MG/100ML; MG/100ML; MG/100ML; MG/100ML
INJECTION, SOLUTION INTRAVENOUS CONTINUOUS
Status: DISCONTINUED | OUTPATIENT
Start: 2019-07-24 | End: 2019-07-24

## 2019-07-24 RX ORDER — PROPOFOL 10 MG/ML
INJECTION, EMULSION INTRAVENOUS CONTINUOUS PRN
Status: DISCONTINUED | OUTPATIENT
Start: 2019-07-24 | End: 2019-07-24

## 2019-07-24 RX ORDER — ONDANSETRON 2 MG/ML
4 INJECTION INTRAMUSCULAR; INTRAVENOUS EVERY 30 MIN PRN
Status: DISCONTINUED | OUTPATIENT
Start: 2019-07-24 | End: 2019-07-24 | Stop reason: HOSPADM

## 2019-07-24 RX ADMIN — PROPOFOL 50 MG: 10 INJECTION, EMULSION INTRAVENOUS at 12:17

## 2019-07-24 RX ADMIN — SODIUM CHLORIDE, POTASSIUM CHLORIDE, SODIUM LACTATE AND CALCIUM CHLORIDE: 600; 310; 30; 20 INJECTION, SOLUTION INTRAVENOUS at 11:03

## 2019-07-24 RX ADMIN — LIDOCAINE HYDROCHLORIDE 40 MG: 20 INJECTION, SOLUTION INFILTRATION; PERINEURAL at 12:17

## 2019-07-24 RX ADMIN — PROPOFOL 150 MCG/KG/MIN: 10 INJECTION, EMULSION INTRAVENOUS at 12:18

## 2019-07-24 RX ADMIN — LIDOCAINE HYDROCHLORIDE 0.3 ML: 10 INJECTION, SOLUTION EPIDURAL; INFILTRATION; INTRACAUDAL; PERINEURAL at 11:03

## 2019-07-24 NOTE — ANESTHESIA PREPROCEDURE EVALUATION
Anesthesia Pre-Procedure Evaluation    Patient: Love Kaiser   MRN: [id number] : 1950          Preoperative Diagnosis: Adenomatous polyp    Procedure(s):  COLONOSCOPY    Past Medical History:   Diagnosis Date     Acute alcoholic hepatitis      Acute pancreatitis 06    aDMIT. dISCHARGED 08/10/06     Cancer of parotid gland (H)      Chronic airway obstruction, not elsewhere classified      COPD (chronic obstructive pulmonary disease) (H) 3/30/2009     Diarrhea      Hyposmolality and/or hyponatremia      Other and unspecified alcohol dependence, continuous drinking behavior      Other chronic nonalcoholic liver disease      Unspecified protein-calorie malnutrition (H)      Past Surgical History:   Procedure Laterality Date     ARTHROPLASTY SHOULDER Right 2015    Procedure: ARTHROPLASTY SHOULDER;  Surgeon: Ashutosh Andrews DO;  [facility]     C REMOVAL OF OVARY(S)       CL AFF SURGICAL PATHOLOGY      parotid tumor with malignancy     COLONOSCOPY  09     COLONOSCOPY N/A 2016    Procedure: COMBINED COLONOSCOPY, SINGLE OR MULTIPLE BIOPSY/POLYPECTOMY BY BIOPSY;  Surgeon: John Bellamy MD;  [facility]     EXCISE MASS FOOT  2012    Procedure:EXCISE MASS FOOT; Excision of Mass Right Foot; Surgeon:CARRIE PAGAN; [facility]     FOREIGN BODY REMOVAL  07/08/10    Soft tissue mass w/peripheral metal fragments     HC COLONOSCOPY W BIOPSY  06     HC COLONOSCOPY W BIOPSY  10/31/07     HC UGI ENDOSCOPY DIAG W BIOPSY  06     HYSTERECTOMY, PAP NO LONGER INDICATED       HYSTERECTOMY, FIDE       OPEN REDUCTION INTERNAL FIXATION WRIST Left 2019    Procedure: Left distal radius open reduction and internal fixation;  Surgeon: Ashutosh Andrews DO;  Location: [facility]       Anesthesia Evaluation     . Pt has had prior anesthetic. Type: General and MAC    No history of anesthetic complications          ROS/MED HX    ENT/Pulmonary:  Comment: Chronic airway obstruction    (+)moderate COPD, , . .    Neurologic:      (-) seizures and CVA   Cardiovascular:     (+) Dyslipidemia, hypertension----. : . . . :. . Previous cardiac testing date:results:date: results:ECG reviewed date:1-30-19 results:SR date: results:         (-) CAD   METS/Exercise Tolerance:     Hematologic:  - neg hematologic  ROS       Musculoskeletal:   (+) arthritis,  -       GI/Hepatic:     (+) GERD Asymptomatic on medication, bowel prep, liver disease,       Renal/Genitourinary:         Endo:         Psychiatric:     (+) psychiatric history anxiety and depression      Infectious Disease:         Malignancy:   (+) Malignancy History of Other  Other CA Parotid gland Remission status post         Other:    - neg other ROS                      Physical Exam  Normal systems: cardiovascular and pulmonary    Airway   Mallampati: II  TM distance: >3 FB  Neck ROM: full    Dental   (+) upper dentures and lower dentures    Cardiovascular   Rhythm and rate: regular and normal  (-) no murmur    Pulmonary    breath sounds clear to auscultation            Lab Results   Component Value Date    WBC 6.8 08/24/2016    HGB 14.4 08/24/2016    HCT 40.5 08/24/2016     08/24/2016    SED 19 01/06/2007     12/04/2018    POTASSIUM 4.3 12/04/2018    CHLORIDE 107 12/04/2018    CO2 27 12/04/2018    BUN 9 12/04/2018    CR 0.77 12/04/2018    GLC 91 12/04/2018    SHAWNA 8.7 12/04/2018    PHOS 2.9 08/10/2006    MAG 2.5 (H) 08/09/2006    ALBUMIN 4.3 12/04/2018    PROTTOTAL 7.7 12/04/2018    ALT 23 12/04/2018    AST 14 12/04/2018     (H) 08/09/2006    ALKPHOS 68 12/04/2018    BILITOTAL 0.8 12/04/2018    LIPASE 83 01/10/2007    AMYLASE 71 01/10/2007    PTT 32 01/10/2007    INR 0.93 08/24/2016    TSH 3.79 01/29/2015       Preop Vitals  BP Readings from Last 3 Encounters:   04/11/19 160/80   03/06/19 124/72   02/14/19 148/83    Pulse Readings from Last 3 Encounters:   02/14/19 83   02/01/19 79  "  01/30/19 74      Resp Readings from Last 3 Encounters:   02/01/19 15   01/30/19 18   12/04/18 16    SpO2 Readings from Last 3 Encounters:   02/01/19 93%   01/30/19 97%   12/04/18 97%      Temp Readings from Last 1 Encounters:   02/01/19 98.3  F (36.8  C) (Oral)    Ht Readings from Last 1 Encounters:   04/11/19 1.588 m (5' 2.5\")      Wt Readings from Last 1 Encounters:   04/11/19 72.6 kg (160 lb)    Estimated body mass index is 28.8 kg/m  as calculated from the following:    Height as of 4/11/19: 1.588 m (5' 2.5\").    Weight as of 4/11/19: 72.6 kg (160 lb).       Anesthesia Plan      History & Physical Review  History and physical reviewed and following examination; no interval change.    ASA Status:  3 .    NPO Status:  > 8 hours    Plan for MAC with Intravenous and Propofol induction. Maintenance will be TIVA.  Reason for MAC:  Deep or markedly invasive procedure (G8)  PONV prophylaxis:  Ondansetron (or other 5HT-3)       Postoperative Care      Consents  Anesthetic plan, risks, benefits and alternatives discussed with:  Patient or representative and Patient.  Use of blood products discussed: No .   .                 JEANNIE Joshi CRNA  "

## 2019-07-24 NOTE — ANESTHESIA POSTPROCEDURE EVALUATION
Patient: Love DIALLO Gronlchris    Procedure(s):  COLONOSCOPY    Diagnosis:Adenomatous polyp  Diagnosis Additional Information: No value filed.    Anesthesia Type:  MAC    Note:  Anesthesia Post Evaluation    Patient location during evaluation: Phase 2 and Bedside  Patient participation: Able to fully participate in evaluation  Level of consciousness: awake and alert  Pain management: adequate  Airway patency: patent  Cardiovascular status: acceptable  Respiratory status: acceptable  Hydration status: acceptable  PONV: none     Anesthetic complications: None          Last vitals:  Vitals:    07/24/19 1049   BP: 135/88   Pulse: 92   Resp: 20   Temp: 97.8  F (36.6  C)   SpO2: 100%         Electronically Signed By: JEANNIE Joshi CRNA  July 24, 2019  12:49 PM

## 2019-07-24 NOTE — ANESTHESIA CARE TRANSFER NOTE
Patient: Love Kaiser    Procedure(s):  COLONOSCOPY    Diagnosis: Adenomatous polyp  Diagnosis Additional Information: No value filed.    Anesthesia Type:   MAC     Note:  Airway :Room Air  Patient transferred to:Phase II  Handoff Report: Identifed the Patient, Identified the Reponsible Provider, Reviewed the pertinent medical history, Discussed the surgical course, Reviewed Intra-OP anesthesia mangement and issues during anesthesia, Set expectations for post-procedure period and Allowed opportunity for questions and acknowledgement of understanding      Vitals: (Last set prior to Anesthesia Care Transfer)    CRNA VITALS  7/24/2019 1210 - 7/24/2019 1249      7/24/2019             Resp Rate (observed):  4  (Abnormal)                 Electronically Signed By: JEANNIE Joshi CRNA  July 24, 2019  12:49 PM

## 2019-07-24 NOTE — H&P
[facility]    Pre-Endoscopy History and Physical     Love Kaiser MRN# [id number]   YOB: 1950 Age: 69 year old     Date of Procedure: 7/24/2019  Primary care provider: Gauri Kenney  Type of Endoscopy: Colonoscopy with possible biopsy, possible polypectomy  Reason for Procedure: survilleance  Type of Anesthesia Anticipated: MAC    HPI:    Love is a 69 year old female who will be undergoing the above procedure.  colonoscopy in 2016 - hx of adenomatous polyps; famhx of colon cancer    A history and physical has been performed. The patient's medications and allergies have been reviewed. The risks and benefits of the procedure and the sedation options and risks were discussed with the patient.  All questions were answered and informed consent was obtained.      She denies a personal or family history of anesthesia complications or bleeding disorders.     Patient Active Problem List   Diagnosis     Esophageal reflux     Acute gastritis     Slow transit constipation     Osteoporosis     COPD (chronic obstructive pulmonary disease) (H)     Pneumonia     Atopic rhinitis     Hyperlipidemia LDL goal <160     Health Care Home     Hx of colonic polyp     Adenomatous polyp     Advanced directives, counseling/discussion     S/P shoulder replacement     Essential hypertension, benign     Impingement syndrome, shoulder, left     Prinzmetal angina (H)     Hyperlipidemia LDL goal <70     Macular degeneration     Closed Colles' fracture of left radius, initial encounter        Past Medical History:   Diagnosis Date     Acute alcoholic hepatitis      Acute pancreatitis 08/07/06    aDMIT. dISCHARGED 08/10/06     Cancer of parotid gland (H) 1974     Chronic airway obstruction, not elsewhere classified      COPD (chronic obstructive pulmonary disease) (H) 3/30/2009     Diarrhea      Hyposmolality and/or hyponatremia      Other and unspecified alcohol dependence, continuous drinking  behavior      Other chronic nonalcoholic liver disease      Unspecified protein-calorie malnutrition (H)         Past Surgical History:   Procedure Laterality Date     ARTHROPLASTY SHOULDER Right 5/26/2015    Procedure: ARTHROPLASTY SHOULDER;  Surgeon: Ashutosh Andrews DO;  [facility]     C REMOVAL OF OVARY(S)       CL AFF SURGICAL PATHOLOGY  1974    parotid tumor with malignancy     COLONOSCOPY  09/21/09     COLONOSCOPY N/A 7/6/2016    Procedure: COMBINED COLONOSCOPY, SINGLE OR MULTIPLE BIOPSY/POLYPECTOMY BY BIOPSY;  Surgeon: John Bellamy MD;  [facility]     EXCISE MASS FOOT  1/17/2012    Procedure:EXCISE MASS FOOT; Excision of Mass Right Foot; Surgeon:CARRIE PAGAN; Location:[facility]     FOREIGN BODY REMOVAL  07/08/10    Soft tissue mass w/peripheral metal fragments     HC COLONOSCOPY W BIOPSY  11/22/06     HC COLONOSCOPY W BIOPSY  10/31/07     HC UGI ENDOSCOPY DIAG W BIOPSY  11/22/06     HYSTERECTOMY, PAP NO LONGER INDICATED       HYSTERECTOMY, FIDE       OPEN REDUCTION INTERNAL FIXATION WRIST Left 2/1/2019    Procedure: Left distal radius open reduction and internal fixation;  Surgeon: Ashutosh Andrews DO;  [facility]       Social History     Tobacco Use     Smoking status: Current Every Day Smoker     Packs/day: 0.50     Years: 29.00     Pack years: 14.50     Types: Cigarettes     Smokeless tobacco: Never Used   Substance Use Topics     Alcohol use: No     Alcohol/week: 14.0 oz     Comment: quit drinking       Family History   Problem Relation Age of Onset     Arthritis Mother         RA     Cancer Mother         female organs     Cancer Father         colon       Prior to Admission medications    Medication Sig Start Date End Date Taking? Authorizing Provider   aspirin 81 MG tablet Take 1 tablet (81 mg) by mouth daily 12/1/16  Yes Gauri Kenney MD   Atorvastatin Calcium (LIPITOR PO) Take 40 mg by mouth daily   Yes Reported, Patient   CARTIA  MG 24 hr  "capsule Take 180 mg by mouth daily 11/7/16  Yes Reported, Patient   docusate sodium (COLACE) 100 MG capsule Take 1 capsule (100 mg) by mouth daily 1/30/19  Yes Gauri Kenney MD   POTASSIUM 75 MG OR TABS 1 TABLET  DAILY 12/15/06  Yes Gauri Kenney MD   PROAIR  (90 Base) MCG/ACT inhaler INHALE TWO PUFFS BY MOUTH EVERY FOUR HOURS AS NEEDED  2/13/19  Yes Gauri Kenney MD   VITAMIN B-12 500 MCG OR TABS daily 2/11/10  Yes Gauri Kenney MD   VITAMIN D-3 SUPER STRENGTH 2000 UNIT OR TABS daily 2/11/10  Yes Gauri Kenney MD   buPROPion (WELLBUTRIN XL) 150 MG 24 hr tablet Take 1 tablet (150 mg) by mouth every morning 1/30/19 1/30/20  Gauri Kenney MD   NITROGLYCERIN SL Place 0.4 mg under the tongue Reported on 5/17/2017    Reported, Patient   VITAMIN C 500 MG OR TABS ONE TABLET DAILY 2/11/10   Garui Kenney MD       Allergies   Allergen Reactions     Nickel Itching     No Known Drug Allergies         REVIEW OF SYSTEMS:   neg    PHYSICAL EXAM:   /88   Pulse 92   Temp 97.8  F (36.6  C) (Oral)   Resp 20   SpO2 100%  Estimated body mass index is 28.8 kg/m  as calculated from the following:    Height as of 4/11/19: 1.588 m (5' 2.5\").    Weight as of 4/11/19: 72.6 kg (160 lb).   Constitutional: Awake, alert, no acute distress.  Eyes: No scleral icterus.  Conjunctiva are without injection.  ENMT: Mucous membranes moist, dentition and gums are intact.   Neck: Soft, supple, trachea midline.    Endocrine: The thyroid is without masses and mobile with swallow.   Lymphatic: There is no cervical, submandibular, adenopathy.  Respiratory: Lungs are clear to auscultation and percussion bilaterally.   Cardiovascular: Regular rate and rhythm. No murmurs, rubs, or gallops.    Abdomen: Non-distended, non-tender, normoactive bowel sounds present, N No hepatosplenomegaly.   Musculoskeletal: No spinal or CVA tenderness. Full range of motion in the upper and lower extremities.    Skin: No skin rashes or " lesions to inspection.  No petechia.    Neurologic: Cranial nerves II through XII are grossly intact and symmetric.  Psychiatric: The patient is alert and oriented times 3.  The patient's affect is not blunted and mood is appropriate.  DIAGNOSTICS:    Not indicated    IMPRESSION   ASA Class 2 - Mild systemic disease    PLAN:   Plan for Colonoscopy with possible biopsy, possible polypectomy. We discussed the risks, benefits and alternatives and the patient wished to proceed.  Patient is cleared for the above procedure.    The above has been forwarded to the consulting provider.    [facility], [facility]

## 2019-07-24 NOTE — DISCHARGE INSTRUCTIONS
[facility]    Home Care Following Endoscopy          Activity:    You have just undergone an endoscopic procedure usually performed with conscious sedation.  Do not work or operate machinery (including a car) for at least 12 hours.      I encourage you to walk and attempt to pass this air as soon as possible.    Diet:    Return to the diet you were on before your procedure but eat lightly for the first 12-24 hours.    Drink plenty of water.    Resume any regular medications unless otherwise advised by your physician.  Please begin any new medication prescribed as a result of your procedure as directed by your physician.     If you had any biopsy or polyp removed please refrain from aspirin or aspirin products for 2 days.  If on Coumadin please restart as instructed by your physician.   Pain:    You may take Tylenol as needed for pain.  Expected Recovery:    You can expect some mild abdominal fullness and/or discomfort due to the air used to inflate your intestinal tract. It is also normal to have a mild sore throat after upper endoscopy.    Call Your Physician if You Have:    After Colonoscopy:  o Worsening persisting abdominal pain which is worse with activity.  o Fevers (>101 degrees F), chills or shakes.  o Passage of continued blood with bowel movements.   Any questions or concerns about your recovery, please call [phone] or after hours [phone] Nurse Advice Line.    Follow-up Care:    You should receive a call or letter with your results within 1 week. Please call if you have not received a notification of your results.  If asked to return to clinic please make an appointment 1 week after your procedure.  Call [phone].

## 2019-08-05 ENCOUNTER — TRANSFERRED RECORDS (OUTPATIENT)
Dept: HEALTH INFORMATION MANAGEMENT | Facility: CLINIC | Age: 69
End: 2019-08-05

## 2019-12-16 ENCOUNTER — TRANSFERRED RECORDS (OUTPATIENT)
Dept: HEALTH INFORMATION MANAGEMENT | Facility: CLINIC | Age: 69
End: 2019-12-16

## 2020-03-12 DIAGNOSIS — E78.5 HYPERLIPEMIA: Primary | ICD-10-CM

## 2020-03-12 LAB
CHOLEST SERPL-MCNC: 154 MG/DL
HDLC SERPL-MCNC: 62 MG/DL
LDLC SERPL CALC-MCNC: 64 MG/DL
NONHDLC SERPL-MCNC: 92 MG/DL
TRIGL SERPL-MCNC: 138 MG/DL

## 2020-03-12 PROCEDURE — 36415 COLL VENOUS BLD VENIPUNCTURE: CPT | Performed by: PHYSICIAN ASSISTANT

## 2020-03-12 PROCEDURE — 80061 LIPID PANEL: CPT | Performed by: PHYSICIAN ASSISTANT

## 2020-03-25 DIAGNOSIS — J44.1 COPD EXACERBATION (H): ICD-10-CM

## 2020-03-25 DIAGNOSIS — J41.0 SIMPLE CHRONIC BRONCHITIS (H): ICD-10-CM

## 2020-03-26 RX ORDER — ALBUTEROL SULFATE 90 UG/1
AEROSOL, METERED RESPIRATORY (INHALATION)
Qty: 8.5 G | Refills: 1 | Status: SHIPPED | OUTPATIENT
Start: 2020-03-26 | End: 2020-07-15

## 2020-03-26 NOTE — TELEPHONE ENCOUNTER
Due for visit, deferring due to covid. Ivory given. Please have her let us know if having any worsening symptoms.  Gauri Kenney MD

## 2020-07-13 DIAGNOSIS — J41.0 SIMPLE CHRONIC BRONCHITIS (H): ICD-10-CM

## 2020-07-13 DIAGNOSIS — J44.1 COPD EXACERBATION (H): ICD-10-CM

## 2020-07-13 NOTE — TELEPHONE ENCOUNTER
Pending Prescriptions:                       Disp   Refills    albuterol (PROAIR HFA/PROVENTIL HFA/VENTOL*18 g   0        Sig: INHALE TWO PUFFS BY MOUTH EVERY FOUR HOURS AS NEEDED       Support Staff:   Please call patient to schedule a visit. (F2F, Video, Phone or E-visit) med check   Then ask if patient will need a refill of the above medication before the visit.   Please reflag for RN and route to pool to give a 30 or 90 day kaushik to get them to their visit or to remove the medication and to close the encounter.    Thank you!    Quang Ferraro, RN, BSN

## 2020-07-14 RX ORDER — ALBUTEROL SULFATE 90 UG/1
AEROSOL, METERED RESPIRATORY (INHALATION)
Qty: 18 G | Refills: 0 | OUTPATIENT
Start: 2020-07-14

## 2020-07-14 NOTE — PROGRESS NOTES
"Love Kaiser is a 70 year old female who is being evaluated via a billable telephone visit.      The patient has been notified of following:     \"This telephone visit will be conducted via a call between you and your physician/provider. We have found that certain health care needs can be provided without the need for a physical exam.  This service lets us provide the care you need with a short phone conversation.  If a prescription is necessary we can send it directly to your pharmacy.  If lab work is needed we can place an order for that and you can then stop by our lab to have the test done at a later time.    Telephone visits are billed at different rates depending on your insurance coverage. During this emergency period, for some insurers they may be billed the same as an in-person visit.  Please reach out to your insurance provider with any questions.    If during the course of the call the physician/provider feels a telephone visit is not appropriate, you will not be charged for this service.\"    Patient has given verbal consent for Telephone visit?  Yes    What phone number would you like to be contacted at?     How would you like to obtain your AVS? Mail a copy    Subjective     Love Kaiser is a 70 year old female who presents via phone visit today for the following health issues:  Woke up the last week of Feb and the first week of March and could not keep up without inhaler and had trouble getting between bed and bathroom. Has not felt like that since she had pneumonia.  Discussed controllers and spirometry and possible updates to COPD severity.  Wants to know about hearing testing.    HPI  Annual Wellness Visit    Are you in the first 12 months of your Medicare Part B coverage?  No    Physical Health:    In general, how would you rate your overall physical health? good    Outside of work, how many days during the week do you exercise?6-7 days/week    Outside of work, approximately how many " "minutes a day do you exercise?15-30 minutes    If you drink alcohol do you typically have >3 drinks per day or >7 drinks per week? No    Do you usually eat at least 4 servings of fruit and vegetables a day, include whole grains & fiber and avoid regularly eating high fat or \"junk\" foods? Yes    Do you have any problems taking medications regularly? No    Do you have any side effects from medications? none    Needs assistance for the following daily activities: transportation , legally blind and cannot drive    Which of the following safety concerns are present in your home?  none identified     Hearing impairment: Yes, Feel that people are mumbling or not speaking clearly.    Need to ask people to speak up or repeat themselves.    In the past 6 months, have you been bothered by leaking of urine? yes    Mental Health:    In general, how would you rate your overall mental or emotional health? good  PHQ-2 Score:      Do you feel safe in your environment? Yes    Have you ever done Advance Care Planning? (For example, a Health Directive, POLST, or a discussion with a medical provider or your loved ones about your wishes)? No, advance care planning information given to patient to review.  Advanced care planning was discussed at today's visit.    Fall risk:NO to both       Cognitive Screenin) Repeat 3 items (Leader, Season, Table)    2) Clock draw: done during visit, will mail in. Normal- addended  3) 3 item recall: Recalls 2 objects   Results: 3 items recalled: COGNITIVE IMPAIRMENT LESS LIKELY    Mini-CogTM Copyright S Kellee. Licensed by the author for use in [facility]; reprinted with permission (soob@.[email or web address]). All rights reserved.          Current providers sharing in care for this patient include:   Patient Care Team:  Gauri Kenney MD as PCP - General  Irma Saez PA-C as Assigned PCP   [facility] for cardiology                -------------------------------------    Patient Active " Problem List   Diagnosis     Esophageal reflux     Acute gastritis     Slow transit constipation     Osteoporosis     COPD (chronic obstructive pulmonary disease) (H)     Pneumonia     Atopic rhinitis     Hyperlipidemia LDL goal <160     Health Care Home     Hx of colonic polyp     Adenomatous polyp     Advanced directives, counseling/discussion     S/P shoulder replacement     Essential hypertension, benign     Impingement syndrome, shoulder, left     Prinzmetal angina (H)     Hyperlipidemia LDL goal <70     Intermediate stage nonexudative age-related macular degeneration of both eyes     Closed Colles' fracture of left radius, initial encounter     Legally blind     Past Surgical History:   Procedure Laterality Date     ARTHROPLASTY SHOULDER Right 5/26/2015    Procedure: ARTHROPLASTY SHOULDER;  Surgeon: Ashutosh Andrews DO;  [facility]     C REMOVAL OF OVARY(S)       CL AFF SURGICAL PATHOLOGY  1974    parotid tumor with malignancy     COLONOSCOPY  09/21/09     COLONOSCOPY N/A 7/6/2016    Procedure: COMBINED COLONOSCOPY, SINGLE OR MULTIPLE BIOPSY/POLYPECTOMY BY BIOPSY;  Surgeon: John Bellamy MD;  [facility]     COLONOSCOPY N/A 7/24/2019    Procedure: COLONOSCOPY;  Surgeon: Kia Jean MD;  [facility]     EXCISE MASS FOOT  1/17/2012    Procedure:EXCISE MASS FOOT; Excision of Mass Right Foot; Surgeon:CARRIE PAGAN; [facility]     FOREIGN BODY REMOVAL  07/08/10    Soft tissue mass w/peripheral metal fragments     HC COLONOSCOPY W BIOPSY  11/22/06     HC COLONOSCOPY W BIOPSY  10/31/07     HC UGI ENDOSCOPY DIAG W BIOPSY  11/22/06     HYSTERECTOMY, PAP NO LONGER INDICATED       HYSTERECTOMY, FIDE       OPEN REDUCTION INTERNAL FIXATION WRIST Left 2/1/2019    Procedure: Left distal radius open reduction and internal fixation;  Surgeon: Ashutosh Andrews DO;  [facility]: [facility]       Social History     Tobacco Use     Smoking status: Current Every Day Smoker      Packs/day: 0.50     Years: 29.00     Pack years: 14.50     Types: Cigarettes     Smokeless tobacco: Never Used   Substance Use Topics     Alcohol use: No     Alcohol/week: 23.3 standard drinks     Comment: quit drinking     Family History   Problem Relation Age of Onset     Arthritis Mother         RA     Cancer Mother         female organs     Cancer Father         colon           Reviewed and updated as needed this visit by Provider  Tobacco  Allergies  Meds  Problems  Med Hx  Surg Hx  Fam Hx         Review of Systems   Constitutional, HEENT, cardiovascular, pulmonary, GI, , musculoskeletal, neuro, skin, endocrine and psych systems are negative, except as otherwise noted.       Objective   Reported vitals:  There were no vitals taken for this visit.   healthy, alert and no distress  PSYCH: Alert and oriented times 3; coherent speech, normal   rate and volume, able to articulate logical thoughts, able   to abstract reason, no tangential thoughts, no hallucinations   or delusions  Her affect is normal  RESP: No cough, no audible wheezing, able to talk in full sentences  Remainder of exam unable to be completed due to telephone visits            Assessment/Plan:    ICD-10-CM    1. Encounter for Medicare annual wellness exam  Z00.00    2. Simple chronic bronchitis (H)  J41.0 albuterol (PROAIR HFA) 108 (90 Base) MCG/ACT inhaler     PHYSICIAN TELEPHONE EVALUATION 5-10 MIN   3. COPD exacerbation (H)  J44.1 albuterol (PROAIR HFA) 108 (90 Base) MCG/ACT inhaler     PHYSICIAN TELEPHONE EVALUATION 5-10 MIN   4. Hyperlipidemia LDL goal <160  E78.5 PHYSICIAN TELEPHONE EVALUATION 5-10 MIN   5. Intermediate stage nonexudative age-related macular degeneration of both eyes  H35.3132    6. Legally blind  H54.8    7. Encounter for screening mammogram for breast cancer  Z12.31 MA SCREENING DIGITAL BILAT - Future  (s+30)      Brief 10 min discussion on the COPD and hearing. Not yet ready for audiology as just a little  bother.  Discussed COPD and plan and would ideally get spirometry, but cannot get to the clinic at this time due to COVID, will consider when she can.    Will send AVS with reminders and information.    Return in about 53 weeks (around 7/21/2021) for Annual Wellness Visit.      Phone call duration:  27 minutes    Gauri Kenney MD, MD

## 2020-07-15 ENCOUNTER — VIRTUAL VISIT (OUTPATIENT)
Dept: FAMILY MEDICINE | Facility: OTHER | Age: 70
End: 2020-07-15
Payer: COMMERCIAL

## 2020-07-15 DIAGNOSIS — Z00.00 ENCOUNTER FOR MEDICARE ANNUAL WELLNESS EXAM: Primary | ICD-10-CM

## 2020-07-15 DIAGNOSIS — H35.3132 INTERMEDIATE STAGE NONEXUDATIVE AGE-RELATED MACULAR DEGENERATION OF BOTH EYES: ICD-10-CM

## 2020-07-15 DIAGNOSIS — J41.0 SIMPLE CHRONIC BRONCHITIS (H): ICD-10-CM

## 2020-07-15 DIAGNOSIS — Z12.31 ENCOUNTER FOR SCREENING MAMMOGRAM FOR BREAST CANCER: ICD-10-CM

## 2020-07-15 DIAGNOSIS — E78.5 HYPERLIPIDEMIA LDL GOAL <160: ICD-10-CM

## 2020-07-15 DIAGNOSIS — H54.8 LEGALLY BLIND: ICD-10-CM

## 2020-07-15 DIAGNOSIS — J44.1 COPD EXACERBATION (H): ICD-10-CM

## 2020-07-15 PROCEDURE — G0439 PPPS, SUBSEQ VISIT: HCPCS | Mod: 95 | Performed by: FAMILY MEDICINE

## 2020-07-15 PROCEDURE — 99213 OFFICE O/P EST LOW 20 MIN: CPT | Mod: 25 | Performed by: FAMILY MEDICINE

## 2020-07-15 RX ORDER — ALBUTEROL SULFATE 90 UG/1
AEROSOL, METERED RESPIRATORY (INHALATION)
Qty: 8.5 G | Refills: 1 | Status: SHIPPED | OUTPATIENT
Start: 2020-07-15 | End: 2020-12-30

## 2020-07-15 NOTE — PATIENT INSTRUCTIONS
DUE For shingles and Tdap    Patient Education   Personalized Prevention Plan  You are due for the preventive services outlined below.  Your care team is available to assist you in scheduling these services.  If you have already completed any of these items, please share that information with your care team to update in your medical record.  Health Maintenance Due   Topic Date Due     Zoster (Shingles) Vaccine (1 of 2) 03/15/2002     Annual Wellness Visit  03/15/2017     FALL RISK ASSESSMENT  05/11/2018     Diptheria Tetanus Pertussis (DTAP/TDAP/TD) Vaccine (2 - Td) 03/30/2019     Mammogram  05/04/2019     PHQ-2  01/01/2020     Discuss Advance Care Planning  01/28/2020

## 2020-09-23 ENCOUNTER — ANCILLARY PROCEDURE (OUTPATIENT)
Dept: MAMMOGRAPHY | Facility: OTHER | Age: 70
End: 2020-09-23
Attending: FAMILY MEDICINE
Payer: COMMERCIAL

## 2020-09-23 DIAGNOSIS — Z12.31 ENCOUNTER FOR SCREENING MAMMOGRAM FOR BREAST CANCER: ICD-10-CM

## 2020-09-23 PROCEDURE — 77067 SCR MAMMO BI INCL CAD: CPT | Mod: TC

## 2020-09-23 PROCEDURE — 77063 BREAST TOMOSYNTHESIS BI: CPT | Mod: TC

## 2020-12-14 ENCOUNTER — TRANSFERRED RECORDS (OUTPATIENT)
Dept: HEALTH INFORMATION MANAGEMENT | Facility: CLINIC | Age: 70
End: 2020-12-14

## 2020-12-29 DIAGNOSIS — J41.0 SIMPLE CHRONIC BRONCHITIS (H): ICD-10-CM

## 2020-12-29 DIAGNOSIS — J44.1 COPD EXACERBATION (H): ICD-10-CM

## 2020-12-30 RX ORDER — ALBUTEROL SULFATE 90 UG/1
AEROSOL, METERED RESPIRATORY (INHALATION)
Qty: 8.5 G | Refills: 0 | Status: SHIPPED | OUTPATIENT
Start: 2020-12-30 | End: 2021-02-22

## 2020-12-30 NOTE — TELEPHONE ENCOUNTER
Prescription approved per [facility] Refill Protocol.  Rolan Whyte RN  December 30, 2020

## 2021-02-15 ENCOUNTER — OFFICE VISIT (OUTPATIENT)
Dept: FAMILY MEDICINE | Facility: OTHER | Age: 71
End: 2021-02-15
Payer: COMMERCIAL

## 2021-02-15 ENCOUNTER — NURSE TRIAGE (OUTPATIENT)
Dept: FAMILY MEDICINE | Facility: OTHER | Age: 71
End: 2021-02-15

## 2021-02-15 VITALS
HEART RATE: 94 BPM | OXYGEN SATURATION: 99 % | RESPIRATION RATE: 14 BRPM | TEMPERATURE: 98.5 F | DIASTOLIC BLOOD PRESSURE: 90 MMHG | SYSTOLIC BLOOD PRESSURE: 150 MMHG | WEIGHT: 151.5 LBS | BODY MASS INDEX: 26.84 KG/M2 | HEIGHT: 63 IN

## 2021-02-15 DIAGNOSIS — K11.8 PAROTID DUCT OBSTRUCTION: Primary | ICD-10-CM

## 2021-02-15 PROBLEM — F10.20 ALCOHOL DEPENDENCE (H): Status: ACTIVE | Noted: 2021-02-15

## 2021-02-15 PROCEDURE — 99213 OFFICE O/P EST LOW 20 MIN: CPT | Performed by: PHYSICIAN ASSISTANT

## 2021-02-15 RX ORDER — SULFAMETHOXAZOLE/TRIMETHOPRIM 800-160 MG
1 TABLET ORAL 2 TIMES DAILY
Qty: 20 TABLET | Refills: 0 | Status: SHIPPED | OUTPATIENT
Start: 2021-02-15 | End: 2021-06-15

## 2021-02-15 ASSESSMENT — MIFFLIN-ST. JEOR: SCORE: 1178.71

## 2021-02-15 NOTE — PROGRESS NOTES
"Assessment & Plan     Parotid duct obstruction  Suspected based on her presentation and location today that she has a bit of an infection in this region and hopefully antibiotics will take care of this.  She is to follow-up in 2 weeks and if this is not improving we will obtain a soft tissue CT scan of the neck to evaluate this further.  - sulfamethoxazole-trimethoprim (BACTRIM DS) 800-160 MG tablet; Take 1 tablet by mouth 2 times daily      Tobacco Cessation:   reports that she has been smoking cigarettes. She has a 14.50 pack-year smoking history. She has never used smokeless tobacco.  Tobacco Cessation Action Plan: Information offered: Patient not interested at this time    BMI:   Estimated body mass index is 27.25 kg/m  as calculated from the following:    Height as of this encounter: 1.588 m (5' 2.52\").    Weight as of this encounter: 68.7 kg (151 lb 8 oz).   Weight management plan: Discussed healthy diet and exercise guidelines    Regular exercise  Return in about 2 weeks (around 3/1/2021) for recheck of current condition, if symptoms do not improve.    Isaac Ballesteros PA-C  [facility]     Love Kaiser is a 70 year old female who presents to clinic today for the following health issues:    History of Present Illness       She eats 0-1 servings of fruits and vegetables daily.She consumes 3 sweetened beverage(s) daily.She exercises with enough effort to increase her heart rate 10 to 19 minutes per day.  She exercises with enough effort to increase her heart rate 5 days per week.   She is taking medications regularly.         Neck swelling. Patient noticed it today on the left side. Painful to the touch.     Review of Systems   Constitutional, HEENT, cardiovascular, pulmonary, GI, , musculoskeletal, neuro, skin, endocrine and psych systems are negative, except as otherwise noted.      Objective    BP (!) 150/90   Pulse 94   Temp 98.5  F (36.9  C) (Temporal)   Resp 14 " "  Ht 1.588 m (5' 2.52\")   Wt 68.7 kg (151 lb 8 oz)   SpO2 99%   BMI 27.25 kg/m    Body mass index is 27.25 kg/m .  Physical Exam   GENERAL: healthy, alert and no distress  HENT: normal cephalic/atraumatic, ear canals and TM's normal, nose and mouth without ulcers or lesions, oropharynx clear, oral mucous membranes moist and left parotid gland seems to be inflamed and enlarged.  It is tender to touch.  NECK: cervical adenopathy left anterior chain, no asymmetry, masses, or scars and thyroid normal to palpation  RESP: lungs clear to auscultation - no rales, rhonchi or wheezes  CV: regular rate and rhythm, normal S1 S2, no S3 or S4, no murmur, click or rub, no peripheral edema and peripheral pulses strong  MS: no gross musculoskeletal defects noted, no edema  SKIN: no suspicious lesions or rashes  to exposed visible skin today.  NEURO: Normal strength and tone, mentation intact and speech normal  PSYCH: mentation appears normal, affect normal/bright       "

## 2021-02-15 NOTE — TELEPHONE ENCOUNTER
"Spoke with patient. Neck is swollen from ear to jaw, left side.  All puffy and warm.  Unsure of fever.      1. ONSET: \"When did the pain begin?\"       Sometime over night  2. LOCATION: \"Where does it hurt?\"       Left side on neck  3. PATTERN \"Does the pain come and go, or has it been constant since it started?\"       constant  4. SEVERITY: \"How bad is the pain?\"  (Scale 1-10; or mild, moderate, severe)    - MILD (1-3): doesn't interfere with normal activities     - MODERATE (4-7): interferes with normal activities or awakens from sleep     - SEVERE (8-10):  excruciating pain, unable to do any normal activities       mild  5. RADIATION: \"Does the pain go anywhere else, shoot into your arms?\"      No radiation  6. CORD SYMPTOMS: \"Any weakness or numbness of the arms or legs?\"      no  7. CAUSE: \"What do you think is causing the neck pain?\"      Possible infection  8. NECK OVERUSE: \"Any recent activities that involved turning or twisting the neck?\"      Nothing known  9. OTHER SYMPTOMS: \"Do you have any other symptoms?\" (e.g., headache, fever, chest pain, difficulty breathing, neck swelling)      No headache, no chest pain,  No troubles breathing or talking.  Some swelling and puffiness, no new cough (smokers cough), no runny nose.  No fever known, hearing normally. Drinking normally, tenderness when chewing.   10. PREGNANCY: \"Is there any chance you are pregnant?\" \"When was your last menstrual period?\"        NA    Needs to be seen for visit.    Next 5 appointments (look out 90 days)    Feb 15, 2021  3:40 PM  Office Visit with Isaac Mcmillan PA-C  [facility] ([facility] ) [address]  [facility] [id number] [phone]        HUMBERTO MedinaN, RN, PHN  [facility]  February 15, 2021              Additional Information    Negative: Shock suspected (e.g., cold/pale/clammy skin, too weak to stand, low BP, rapid pulse)    " Negative: Similar pain previously and it was from 'heart attack'    Negative: Similar pain previously from 'angina' and not relieved by nitroglycerin    Negative: Difficult to awaken or acting confused (e.g., disoriented, slurred speech)    Negative: Sounds like a life-threatening emergency to the triager    Negative: Followed an injury to neck (e.g., MVA, sports, impact or collision)    Negative: Chest pain    Negative: Sore throat is the main symptom    Negative: Difficulty breathing or unusual sweating (e.g., sweating without exertion)    Negative: Chest pain lasting longer than 5 minutes    Negative: Stiff neck (can't touch chin to chest) and has headache    Negative: Stiff neck (can't touch chin to chest) and fever    Negative: Weakness of an arm or hand    Negative: Problems with bowel or bladder control    Negative: Patient sounds very sick or weak to the triager    Negative: SEVERE pain (e.g., excruciating, unable to do any normal activities)    Negative: Head is twisting to one side (or ask 'is it turning against your will?')    Negative: Fever > 103 F (39.4 C)    Negative: Fever > 100.0 F (37.8 C) and IVDA (intravenous drug abuse)    Negative: Fever > 100.0 F (37.8 C) and has diabetes mellitus or a weak immune system (e.g., HIV positive, cancer chemotherapy, organ transplant, splenectomy, chronic steroids)    Lymph node in the neck is swollen or painful to the touch    Negative: Sounds like a life-threatening emergency to the triager    Negative: Sore throat is the main symptom and has swollen node in the neck that is < 1 inch (2.5 cm) in size    Negative: Node is in the neck and causes difficulty breathing    Negative: Patient sounds very sick or weak to the triager    Negative: Node is in the neck and can't swallow fluids    Negative: Fever > 103 F (39.4 C)    Negative: Lump or swelling in groin and pulsating (like heartbeat)    Negative: Single large node and size > 1 inch (2.5 cm)    Negative:  Overlying skin is red    Negative: Rapid increase in size of node over several hours    Negative: Tender node in the groin and has a sore, scratch, cut, or painful red area on that leg    Negative: Tender node in the armpit and has a sore, scratch, cut, or painful red area on that arm    Negative: Tender node in the neck and also has a sore throat with minimal/no runny nose or cough    Negative: Fever present > 3 days (72 hours)    Very tender to the touch but no fever    Negative: Large nodes at multiple locations    Protocols used: NECK PAIN OR [id number], LYMPH NODES - [id number]

## 2021-02-22 DIAGNOSIS — J44.1 COPD EXACERBATION (H): ICD-10-CM

## 2021-02-22 DIAGNOSIS — J41.0 SIMPLE CHRONIC BRONCHITIS (H): ICD-10-CM

## 2021-02-22 RX ORDER — ALBUTEROL SULFATE 90 UG/1
AEROSOL, METERED RESPIRATORY (INHALATION)
Qty: 8.5 G | Refills: 0 | Status: SHIPPED | OUTPATIENT
Start: 2021-02-22 | End: 2021-04-06

## 2021-02-22 NOTE — TELEPHONE ENCOUNTER
Prescription approved per [facility] Refill Protocol.  Rolan Whyte RN, BSN  [place]  February 22, 2021

## 2021-04-06 DIAGNOSIS — J41.0 SIMPLE CHRONIC BRONCHITIS (H): ICD-10-CM

## 2021-04-06 DIAGNOSIS — J44.1 COPD EXACERBATION (H): ICD-10-CM

## 2021-04-06 RX ORDER — ALBUTEROL SULFATE 90 UG/1
AEROSOL, METERED RESPIRATORY (INHALATION)
Qty: 8.5 G | Refills: 1 | Status: SHIPPED | OUTPATIENT
Start: 2021-04-06 | End: 2021-07-09

## 2021-04-06 NOTE — TELEPHONE ENCOUNTER
Prescription approved per [facility] Refill Protocol.    Xochitl Kim RN on 4/6/2021 at 2:13 PM

## 2021-06-14 NOTE — PROGRESS NOTES
Assessment & Plan     Breast pain, right  Uncertain the cause of her symptoms.  Her description of pain is consistent with more neuropathic pain.  There are no signs of local infection.  There are no signs of shingles lesions and at this point would have expected skin eruption of symptoms.  There are no masses to suggest breast cancer but given location recommended additional evaluation.  Also obtained x-ray to make sure no obvious lung etiology although she does decline respiratory symptoms.   - XR Chest 2 Views; Future  - MA Diagnostic Digital Bilateral; Future  - US Breast Right Complete 4 Quadrants; Future    At the end of the visit she request something for constipation - she takes senna daily, has had constipation since she was 6 years old, no changes just wants something more effective, recommended Miralax 17 grams daily, they will get at the store. Follow-up if not improving.     Return in about 1 week (around 6/22/2021) for If not improving, sooner if worse or new concerns.    Options for treatment and follow-up care were reviewed with the patient and/or guardian. Patient and/or guardian engaged in the decision making process and verbalized understanding of the options discussed and agreed with the final plan.    Irma Saez PA-C  [facility] FRANKLYN Aleman is a 71 year old who presents for the following health issues  accompanied by her daughter:    HPI     Breast Concern  Onset/Duration: 1 1/2 weeks ago  Description:   Location: upper outer quadrant  Pain or tenderness: YES  Redness: no  Intensity: severe  Progression of Symptoms: worsening  Accompanying Signs & Symptoms:  Any lumps in axillary region: YES  Movable: no  Nipple discharge: none  Changes in the skin or nipple: No  On Hormone therapy: no  Does it change with menstrual cycle: no  Previous history of similar problem: none  First degree relative with breast cancer: a negative family history for breast  cancer.  Precipitating factors:           Worsened by: none  Alleviating factors:            Improved by: none  Therapies tried and outcome: None  No LMP recorded. Patient has had a hysterectomy.    Started about 1.5 weeks ago.  She noticed some tingling and sharp prickly feeling in the right side breast.  It has spread over the top of the breast.  The other day it was so severe she couldn't even let water run on it because it hurt so bad.  Maybe some swelling noted. No rashes or redness.  No masses of the breast that she can feel.   She had her right shoulder replaced so has always had pain after that and she had suffered a fall years ago.  This hasn't changed but the new pain is just adding to it.    She denies fevers, chills, cough, chest pain, shortness of breath, wheezing.  This isn't internal it is external, she can feel it and touch it.     Review of Systems   Constitutional, HEENT, cardiovascular, pulmonary, skin, msk, neuro systems are negative, except as otherwise noted.      Objective    /70 (BP Location: Left arm, Patient Position: Sitting, Cuff Size: Adult Regular)   Pulse 76   Temp 98.5  F (36.9  C) (Temporal)   Resp 15   Wt 65.3 kg (144 lb)   SpO2 98%   BMI 25.90 kg/m    Body mass index is 25.9 kg/m .  Physical Exam   GENERAL: healthy, alert and no distress  BREAST: RIGHT BREAST - tenderness to palpation over the superolateral quadrants of the breast without obvious masses, there is slight increase in size of tissue compared to the left but barely visible, perctoralis muscle is tight and palpable without masses but is mildly tender to palpation.  Nipple and areola appear normal.  No overlying skin changes.  No axillary masses or lymphadenopathy.   MS: no gross musculoskeletal defects noted, no edema  SKIN: no suspicious lesions or rashes  PSYCH: mentation appears normal, affect normal/bright    Results for orders placed or performed in visit on 06/15/21   XR Chest 2 Views     Status: None     Narrative    XR CHEST 2 VW  6/15/2021 12:29 PM       INDICATION: Right sided breast pain  COMPARISON: 9/4/2018       Impression    IMPRESSION: Negative chest.    HAMLET URBAN MD

## 2021-06-15 ENCOUNTER — OFFICE VISIT (OUTPATIENT)
Dept: FAMILY MEDICINE | Facility: OTHER | Age: 71
End: 2021-06-15
Payer: COMMERCIAL

## 2021-06-15 ENCOUNTER — ANCILLARY PROCEDURE (OUTPATIENT)
Dept: GENERAL RADIOLOGY | Facility: OTHER | Age: 71
End: 2021-06-15
Attending: PHYSICIAN ASSISTANT
Payer: COMMERCIAL

## 2021-06-15 ENCOUNTER — TELEPHONE (OUTPATIENT)
Dept: FAMILY MEDICINE | Facility: OTHER | Age: 71
End: 2021-06-15

## 2021-06-15 VITALS
OXYGEN SATURATION: 98 % | WEIGHT: 144 LBS | BODY MASS INDEX: 25.9 KG/M2 | TEMPERATURE: 98.5 F | RESPIRATION RATE: 15 BRPM | HEART RATE: 76 BPM | DIASTOLIC BLOOD PRESSURE: 70 MMHG | SYSTOLIC BLOOD PRESSURE: 120 MMHG

## 2021-06-15 DIAGNOSIS — N64.4 BREAST PAIN, RIGHT: ICD-10-CM

## 2021-06-15 DIAGNOSIS — N64.4 BREAST PAIN, RIGHT: Primary | ICD-10-CM

## 2021-06-15 PROCEDURE — 71046 X-RAY EXAM CHEST 2 VIEWS: CPT | Performed by: RADIOLOGY

## 2021-06-15 PROCEDURE — 99213 OFFICE O/P EST LOW 20 MIN: CPT | Performed by: PHYSICIAN ASSISTANT

## 2021-06-15 NOTE — TELEPHONE ENCOUNTER
Xochitl Singh RT (R) contacted [facility] on 06/15/21 and left a message. If patient calls back please inform patient of results below, thanks    ----- Message from Irma Saez PA-C sent at 6/15/2021  2:52 PM CDT -----  Please call patient. Her chest x-ray is negative, continue with plan to have breast imaging done.     Irma Saez PA-C

## 2021-06-23 ENCOUNTER — ANCILLARY PROCEDURE (OUTPATIENT)
Dept: MAMMOGRAPHY | Facility: CLINIC | Age: 71
End: 2021-06-23
Attending: PHYSICIAN ASSISTANT
Payer: COMMERCIAL

## 2021-06-23 ENCOUNTER — ANCILLARY PROCEDURE (OUTPATIENT)
Dept: ULTRASOUND IMAGING | Facility: CLINIC | Age: 71
End: 2021-06-23
Attending: PHYSICIAN ASSISTANT
Payer: COMMERCIAL

## 2021-06-23 DIAGNOSIS — N64.4 BREAST PAIN, RIGHT: ICD-10-CM

## 2021-06-23 PROCEDURE — G0279 TOMOSYNTHESIS, MAMMO: HCPCS | Performed by: RADIOLOGY

## 2021-06-23 PROCEDURE — 76642 ULTRASOUND BREAST LIMITED: CPT | Mod: RT | Performed by: RADIOLOGY

## 2021-06-23 PROCEDURE — 77066 DX MAMMO INCL CAD BI: CPT | Performed by: RADIOLOGY

## 2021-06-29 NOTE — PROGRESS NOTES
Assessment & Plan     Breast pain, right  At this point she has had mammogram with ultrasound to rule out breast etiology.  Low suspicion this is shoulder joint related as she has full motion and location of her pain is located in breast region.  Question if this is costochondritis as there is a clear start and end point region of her pain but wouldn't explain the significant sensitivity to touch she has over the breast tissue.  Did obtain labs today, if BMP is normal then will trial Naproxen twice daily, could also consider prednisone as well.  If not improving within a week then recommend chest CT. If chest CT is negative then consider pain management for evaluation and even general surgery just given location to talk more about her breast tissue.   - CBC with platelets differential  - ESR: Erythrocyte sedimentation rate  - CRP, inflammation  - Basic metabolic panel    Screening for hyperlipidemia  Due for screening.   - Lipid panel reflex to direct LDL Fasting    Return in about 1 week (around 7/7/2021) for If not improving, sooner if worse or new concerns.     Options for treatment and follow-up care were reviewed with the patient and/or guardian. Patient and/or guardian engaged in the decision making process and verbalized understanding of the options discussed and agreed with the final plan.     Irma Saez PA-C  [facility] FRANKLYN Aleman is a 71 year old who presents for the following health issues     HPI     Follow up Breast Concern  Onset/Duration: closer to 4 weeks now, she was seen about two weeks ago and had imaging done and now it is worse.   Description:   Location: the whole right breast.   Pain or tenderness: YES- anytime there is pain. Sometimes just walking makes it hurt. Supporting does ease the pain if she cups/holds her breast.   Redness: no  Intensity: 3/10  Progression of Symptoms: worsening  Accompanying Signs & Symptoms:  Any lumps in axillary region:  " no   Movable: not applicable.   Nipple discharge: none  Changes in the skin or nipple: none  On Hormone therapy:  no   Does it change with menstrual cycle: not applicable  Previous history of similar problem: yes - has been ongoing for a few weeks.   First degree relative with breast cancer: a negative family history for breast cancer.  Precipitating factors:           Worsened by: everything  Alleviating factors:            Improved by: holding her breast.   Therapies tried and outcome: None  No LMP recorded. Patient has had a hysterectomy.    - Still having persistent right breast pain - so sensitive to touch she can't even wear her two other bras the lace hurt.  She can't  the shower and let the water hit it directly due to pain  - It hasn't changed much other than it is still there.   - She has noticed some pain into her right upper back but she admits she probably isn't using this arm as much  - No rashes or skin changes  - No changes to her breathing.   - No new symptoms  - No fevers or chills.   - Support of the right breast does help her pain.       Review of Systems   Constitutional, HEENT, cardiovascular, pulmonary, msk, skin, neuro systems are negative, except as otherwise noted.      Objective    /78   Pulse 80   Temp 98  F (36.7  C) (Temporal)   Resp 14   Ht 1.61 m (5' 3.39\")   Wt 64.3 kg (141 lb 12.8 oz)   SpO2 97%   BMI 24.81 kg/m    Body mass index is 24.81 kg/m .  Physical Exam   GENERAL: healthy, alert and no distress  MS: no gross musculoskeletal defects noted, no edema.  Full active ROM of the right shoulder with pain elicited with any motion but localized to the right breast tissue.  Non-tender over the right clavicle, GH joint, humeral head, proximal humerus, scapula.  Mildly tender over the right rhomboid muscles but not the pain she is noting.  Tenderness over the right sided costochondral cartilage and less tender over the proximal right side ribs and non-tender over " the sternum.    SKIN: no suspicious lesions or rashes  PSYCH: mentation appears normal, affect normal/bright    Examinations: MA DIAGNOSTIC BILATERAL W/ KANDY, US BREAST RIGHT LIMITED  1-3 QUADRANTS, 6/23/2021 9:36 AM     Comparisons: 9/23/2020, 5/4/2017, 12/29/2014, 11/7/2013, 4/12/2011     History: Right breast pain for 1.5 weeks. No family history of breast  cancer.     BREAST DENSITY: Scattered fibroglandular densities.     Findings:     Mammogram:  Technique: Standard mammographic views were performed with  tomosynthesis and 2D reconstruction.  No significant interval change     Ultrasound:  Real-time targeted technologist and physician performed sonographic  imaging of the right breast.  At the 12:00 position 2 cm from the nipple at the site of pain, no  suspicious sonographic finding is seen.                                                                      IMPRESSION: BI-RADS CATEGORY: 1 -  Negative.     RECOMMENDED FOLLOW-UP: Annual Mammography.  Given the lack of imaging findings, further management of breast pain  should be based on clinical grounds.        The patient was given the results of the examination.     NITESH MYLES MD    XR CHEST 2 VW   6/15/2021 12:29 PM       INDICATION: Right sided breast pain  COMPARISON: 9/4/2018                                                                       IMPRESSION: Negative chest.     HAMLET URBAN MD

## 2021-06-30 ENCOUNTER — OFFICE VISIT (OUTPATIENT)
Dept: FAMILY MEDICINE | Facility: OTHER | Age: 71
End: 2021-06-30
Payer: COMMERCIAL

## 2021-06-30 VITALS
RESPIRATION RATE: 14 BRPM | BODY MASS INDEX: 25.12 KG/M2 | HEART RATE: 80 BPM | HEIGHT: 63 IN | TEMPERATURE: 98 F | DIASTOLIC BLOOD PRESSURE: 78 MMHG | OXYGEN SATURATION: 97 % | SYSTOLIC BLOOD PRESSURE: 130 MMHG | WEIGHT: 141.8 LBS

## 2021-06-30 DIAGNOSIS — Z13.220 SCREENING FOR HYPERLIPIDEMIA: ICD-10-CM

## 2021-06-30 DIAGNOSIS — N64.4 BREAST PAIN, RIGHT: Primary | ICD-10-CM

## 2021-06-30 LAB
ANION GAP SERPL CALCULATED.3IONS-SCNC: 2 MMOL/L (ref 3–14)
BASOPHILS # BLD AUTO: 0.1 10E9/L (ref 0–0.2)
BASOPHILS NFR BLD AUTO: 0.7 %
BUN SERPL-MCNC: 9 MG/DL (ref 7–30)
CALCIUM SERPL-MCNC: 9.5 MG/DL (ref 8.5–10.1)
CHLORIDE SERPL-SCNC: 108 MMOL/L (ref 94–109)
CHOLEST SERPL-MCNC: 149 MG/DL
CO2 SERPL-SCNC: 31 MMOL/L (ref 20–32)
CREAT SERPL-MCNC: 0.79 MG/DL (ref 0.52–1.04)
CRP SERPL-MCNC: <2.9 MG/L (ref 0–8)
DIFFERENTIAL METHOD BLD: NORMAL
EOSINOPHIL # BLD AUTO: 0.1 10E9/L (ref 0–0.7)
EOSINOPHIL NFR BLD AUTO: 1.7 %
ERYTHROCYTE [DISTWIDTH] IN BLOOD BY AUTOMATED COUNT: 12.1 % (ref 10–15)
ERYTHROCYTE [SEDIMENTATION RATE] IN BLOOD BY WESTERGREN METHOD: 5 MM/H (ref 0–30)
GFR SERPL CREATININE-BSD FRML MDRD: 76 ML/MIN/{1.73_M2}
GLUCOSE SERPL-MCNC: 113 MG/DL (ref 70–99)
HCT VFR BLD AUTO: 42 % (ref 35–47)
HDLC SERPL-MCNC: 67 MG/DL
HGB BLD-MCNC: 14.6 G/DL (ref 11.7–15.7)
LDLC SERPL CALC-MCNC: 62 MG/DL
LYMPHOCYTES # BLD AUTO: 2.2 10E9/L (ref 0.8–5.3)
LYMPHOCYTES NFR BLD AUTO: 31.3 %
MCH RBC QN AUTO: 30.8 PG (ref 26.5–33)
MCHC RBC AUTO-ENTMCNC: 34.8 G/DL (ref 31.5–36.5)
MCV RBC AUTO: 89 FL (ref 78–100)
MONOCYTES # BLD AUTO: 0.6 10E9/L (ref 0–1.3)
MONOCYTES NFR BLD AUTO: 9 %
NEUTROPHILS # BLD AUTO: 4 10E9/L (ref 1.6–8.3)
NEUTROPHILS NFR BLD AUTO: 57.3 %
NONHDLC SERPL-MCNC: 82 MG/DL
PLATELET # BLD AUTO: 272 10E9/L (ref 150–450)
POTASSIUM SERPL-SCNC: 3.7 MMOL/L (ref 3.4–5.3)
RBC # BLD AUTO: 4.74 10E12/L (ref 3.8–5.2)
SODIUM SERPL-SCNC: 141 MMOL/L (ref 133–144)
TRIGL SERPL-MCNC: 100 MG/DL
WBC # BLD AUTO: 7 10E9/L (ref 4–11)

## 2021-06-30 PROCEDURE — 80061 LIPID PANEL: CPT | Performed by: PHYSICIAN ASSISTANT

## 2021-06-30 PROCEDURE — 85025 COMPLETE CBC W/AUTO DIFF WBC: CPT | Performed by: PHYSICIAN ASSISTANT

## 2021-06-30 PROCEDURE — 36415 COLL VENOUS BLD VENIPUNCTURE: CPT | Performed by: PHYSICIAN ASSISTANT

## 2021-06-30 PROCEDURE — 85652 RBC SED RATE AUTOMATED: CPT | Performed by: PHYSICIAN ASSISTANT

## 2021-06-30 PROCEDURE — 80048 BASIC METABOLIC PNL TOTAL CA: CPT | Performed by: PHYSICIAN ASSISTANT

## 2021-06-30 PROCEDURE — 99213 OFFICE O/P EST LOW 20 MIN: CPT | Performed by: PHYSICIAN ASSISTANT

## 2021-06-30 PROCEDURE — 86140 C-REACTIVE PROTEIN: CPT | Performed by: PHYSICIAN ASSISTANT

## 2021-06-30 RX ORDER — NAPROXEN 500 MG/1
500 TABLET ORAL 2 TIMES DAILY WITH MEALS
Qty: 60 TABLET | Refills: 0 | Status: SHIPPED | OUTPATIENT
Start: 2021-06-30 | End: 2021-07-07

## 2021-06-30 ASSESSMENT — MIFFLIN-ST. JEOR: SCORE: 1143.45

## 2021-06-30 ASSESSMENT — PAIN SCALES - GENERAL: PAINLEVEL: MILD PAIN (3)

## 2021-06-30 NOTE — LETTER
June 30, 2021      [facility] Kaiser  [address] [facility] [address]        Dear ,    We are writing to inform you of your test results.    No Concerns on the labs.  Kidneys are doing well, cholesterol is normal. I'm going to send over an anti-inflammatory to take twice daily with food in stomach for the next week, if not improving by next week please contact me.     Irma Saez PA-C    Resulted Orders   Lipid panel reflex to direct LDL Fasting   Result Value Ref Range    Cholesterol 149 <200 mg/dL    Triglycerides 100 <150 mg/dL    HDL Cholesterol 67 >49 mg/dL    LDL Cholesterol Calculated 62 <100 mg/dL      Comment:      Desirable:       <100 mg/dl    Non HDL Cholesterol 82 <130 mg/dL   CBC with platelets differential   Result Value Ref Range    WBC 7.0 4.0 - 11.0 10e9/L    RBC Count 4.74 3.8 - 5.2 10e12/L    Hemoglobin 14.6 11.7 - 15.7 g/dL    Hematocrit 42.0 35.0 - 47.0 %    MCV 89 78 - 100 fl    MCH 30.8 26.5 - 33.0 pg    MCHC 34.8 31.5 - 36.5 g/dL    RDW 12.1 10.0 - 15.0 %    Platelet Count 272 150 - 450 10e9/L    % Neutrophils 57.3 %    % Lymphocytes 31.3 %    % Monocytes 9.0 %    % Eosinophils 1.7 %    % Basophils 0.7 %    Absolute Neutrophil 4.0 1.6 - 8.3 10e9/L    Absolute Lymphocytes 2.2 0.8 - 5.3 10e9/L    Absolute Monocytes 0.6 0.0 - 1.3 10e9/L    Absolute Eosinophils 0.1 0.0 - 0.7 10e9/L    Absolute Basophils 0.1 0.0 - 0.2 10e9/L    Diff Method Automated Method    ESR: Erythrocyte sedimentation rate   Result Value Ref Range    Sed Rate 5 0 - 30 mm/h   CRP, inflammation   Result Value Ref Range    CRP Inflammation <2.9 0.0 - 8.0 mg/L   Basic metabolic panel   Result Value Ref Range    Sodium 141 133 - 144 mmol/L    Potassium 3.7 3.4 - 5.3 mmol/L    Chloride 108 94 - 109 mmol/L    Carbon Dioxide 31 20 - 32 mmol/L    Anion Gap 2 (L) 3 - 14 mmol/L    Glucose 113 (H) 70 - 99 mg/dL    Urea Nitrogen 9 7 - 30 mg/dL    Creatinine 0.79 0.52 - 1.04 mg/dL    GFR Estimate 76 >60  mL/min/[1.73_m2]      Comment:      Non  GFR Calc  Starting 12/18/2018, serum creatinine based estimated GFR (eGFR) will be   calculated using the Chronic Kidney Disease Epidemiology Collaboration   (CKD-EPI) equation.      GFR Estimate If Black 89 >60 mL/min/[1.73_m2]      Comment:       GFR Calc  Starting 12/18/2018, serum creatinine based estimated GFR (eGFR) will be   calculated using the Chronic Kidney Disease Epidemiology Collaboration   (CKD-EPI) equation.      Calcium 9.5 8.5 - 10.1 mg/dL       If you have any questions or concerns, please call the clinic at the number listed above.       Sincerely,      Irma Saez PA-C

## 2021-07-06 NOTE — PROGRESS NOTES
Assessment & Plan     Breast pain, right  Her symptoms have consistently been more associated with the breast tissue and skin versus lung or cardiac etiology.  The naproxen did not help at all with her symptoms so she can discontinue it. Up to this point we have had normal x-ray and mammogram imaging.  Recommended evaluation with Dr. Jean to make sure she has no additional concerns with the breast tissue and location of pain, we will try to treat with neuropathic medication Gabapentin. Discussed potential side effects of medication and how to take it. Starting at 100 mg TID and will increase weekly as needed and tolerating.    - GENERAL SURG ADULT REFERRAL; Future  - gabapentin (NEURONTIN) 100 MG capsule; Take 1 capsule (100 mg) by mouth 3 times daily Take for a week and if tolerating can increase to 200 mg three times per day    Return in about 1 week (around 7/14/2021) for Recheck.    Options for treatment and follow-up care were reviewed with the patient and/or guardian. Patient and/or guardian engaged in the decision making process and verbalized understanding of the options discussed and agreed with the final plan.    Irma Saez PA-C  [facility] FRANKLYN Pickens is a 71 year old who presents for the following health issues  accompanied by her daughter:    HPI     Concern - right breast  Onset: month  Description: is only comfortable not in pain if she supports her breast with a pillow  Intensity: moderate  Progression of Symptoms:  same  Accompanying Signs & Symptoms: sharp stabbing pain  Previous history of similar problem: no  Precipitating factors:        Worsened by: not supporting it  Alleviating factors:        Improved by: support with pillow  Therapies tried and outcome:  none     - No improvement with the Naproxen.   - Has actually noticed more breast pain and it has no shifted to the outer quadrant of the breast.     Review of Systems    Constitutional,cardiovascular, pulmonary, skin, msk, neuro systems are negative, except as otherwise noted.      Objective    /80   Pulse 75   Temp 98.3  F (36.8  C) (Temporal)   Resp 16   Wt 64.4 kg (142 lb)   SpO2 96%   BMI 24.85 kg/m    Body mass index is 24.85 kg/m .  Physical Exam   GENERAL: healthy, alert and no distress  MS: no gross musculoskeletal defects noted, no edema  PSYCH: mentation appears normal, affect normal/bright

## 2021-07-07 ENCOUNTER — OFFICE VISIT (OUTPATIENT)
Dept: FAMILY MEDICINE | Facility: OTHER | Age: 71
End: 2021-07-07
Payer: COMMERCIAL

## 2021-07-07 VITALS
RESPIRATION RATE: 16 BRPM | DIASTOLIC BLOOD PRESSURE: 80 MMHG | HEART RATE: 75 BPM | SYSTOLIC BLOOD PRESSURE: 120 MMHG | BODY MASS INDEX: 24.85 KG/M2 | OXYGEN SATURATION: 96 % | WEIGHT: 142 LBS | TEMPERATURE: 98.3 F

## 2021-07-07 DIAGNOSIS — N64.4 BREAST PAIN, RIGHT: Primary | ICD-10-CM

## 2021-07-07 PROCEDURE — 99213 OFFICE O/P EST LOW 20 MIN: CPT | Performed by: PHYSICIAN ASSISTANT

## 2021-07-07 RX ORDER — GABAPENTIN 100 MG/1
100 CAPSULE ORAL 3 TIMES DAILY
Qty: 90 CAPSULE | Refills: 0 | Status: SHIPPED | OUTPATIENT
Start: 2021-07-07 | End: 2021-08-19

## 2021-07-07 ASSESSMENT — PAIN SCALES - GENERAL: PAINLEVEL: SEVERE PAIN (6)

## 2021-07-07 NOTE — PATIENT INSTRUCTIONS
Take gabapentin:  1 tablet three times per day   If tolerating it then after a week increase to 2 tablets three times per day if needed.     STOP Naproxen.     See Dr. Jean Next week.

## 2021-07-07 NOTE — Clinical Note
MARIA DEL CARMEN Jean.     I referred this patient to you for next week.  She has had this continued right breast pain that does not appear to be pulmonary or cardiac in nature.  She can always feel the pain, it is reproducible, some of it sounds neuropathic. Her breast imaging had no immediate concerns. I was hoping you could verify my findings.  I'm trying to treat for more potential neuropathic pain at this point. Let me know if you have any questions or concerns.     Irma Saez PA-C

## 2021-07-09 DIAGNOSIS — J44.1 COPD EXACERBATION (H): ICD-10-CM

## 2021-07-09 DIAGNOSIS — J41.0 SIMPLE CHRONIC BRONCHITIS (H): ICD-10-CM

## 2021-07-09 RX ORDER — ALBUTEROL SULFATE 90 UG/1
AEROSOL, METERED RESPIRATORY (INHALATION)
Qty: 8.5 G | Refills: 0 | Status: SHIPPED | OUTPATIENT
Start: 2021-07-09 | End: 2021-09-01

## 2021-07-09 NOTE — TELEPHONE ENCOUNTER
Pending Prescriptions:                       Disp   Refills    albuterol (PROAIR HFA/PROVENTIL HFA/XIAO*8.5 g  0            Sig: INHALE TWO PUFFS BY MOUTH EVERY FOUR HOURS AS           NEEDED     Medication is being filled for 1 time kaushik refill only due to:  Patient is due for recheck visit    Please call and help schedule.  Thank you!

## 2021-07-13 ENCOUNTER — OFFICE VISIT (OUTPATIENT)
Dept: SURGERY | Facility: CLINIC | Age: 71
End: 2021-07-13
Attending: PHYSICIAN ASSISTANT
Payer: COMMERCIAL

## 2021-07-13 VITALS
DIASTOLIC BLOOD PRESSURE: 74 MMHG | SYSTOLIC BLOOD PRESSURE: 126 MMHG | TEMPERATURE: 97.5 F | BODY MASS INDEX: 25.48 KG/M2 | HEIGHT: 63 IN | WEIGHT: 143.8 LBS

## 2021-07-13 DIAGNOSIS — J41.0 SIMPLE CHRONIC BRONCHITIS (H): ICD-10-CM

## 2021-07-13 DIAGNOSIS — R07.89 RIGHT-SIDED CHEST WALL PAIN: Primary | ICD-10-CM

## 2021-07-13 DIAGNOSIS — Z96.611 STATUS POST REPLACEMENT OF RIGHT SHOULDER JOINT: ICD-10-CM

## 2021-07-13 PROCEDURE — 99203 OFFICE O/P NEW LOW 30 MIN: CPT | Performed by: SURGERY

## 2021-07-13 ASSESSMENT — MIFFLIN-ST. JEOR: SCORE: 1151.16

## 2021-07-13 NOTE — LETTER
7/13/2021         RE: Love Kaiser  [address] [facility] [address]        Dear Colleague,    Thank you for referring your patient, Love Kaiser, to the [facility]. Please see a copy of my visit note below.        Assessment & Plan   Problem List Items Addressed This Visit        Respiratory    COPD (chronic obstructive pulmonary disease) (H)       Other    S/P shoulder replacement      Other Visit Diagnoses     Right-sided chest wall pain    -  Primary         Patient has right sided chest wall pain.  I reassured the patient that this is unlikely rest pain related as her pain is lateral goes medial into her back and into the right shoulder blade.  Thus this is likely muscular skeletal in nature.  I reviewed the patient's ultrasound and mammogram along with her benign family history of breast cancer, thus I reassured her that this is unlikely due to breast tissue related.  She can try to increase the gabapentin to 200mg 3 times daily see if this will help with this pain.  If it does not within the next 1 to 2 weeks, I recommend that patient be reevaluated and likely undergo physical therapy.    No follow-ups on file.    Kia Jean MD  [facility]    De Pickens is a 71 year old who presents for the following health issues  accompanied by her cousin:    Right breast - burning shooting pain - feels like pins and needles push outward.  Intermittent and short - pressure with pain will improve the pain.  Been going on for 7 or so weeks.  Sometimes just bending over or changes in position seems to ellicit the pain.  Tried a tighter bra seems to make it worse.  No changes in the skin, no palpable mass.  No nipple change - no inversion, no discharge, no NAC changes.  Pain is generalized and will change around.  Right shoulder blade also started hurting also.   No numbness or tingling into the right arm and no abnormalities of right hand.  No  "family of breast cancer; never had abnormal mammogram.      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  Right shoulder replacement (in 2015).  Never CVA; never MI.  Does have \"spasm\" of the heart - got her BP meds changed.           Review of Systems   Constitutional, HEENT, cardiovascular, pulmonary, GI, , musculoskeletal, neuro, skin, endocrine and psych systems are negative, except as otherwise noted.      Objective    /74   Temp 97.5  F (36.4  C) (Temporal)   Ht 1.608 m (5' 3.3\")   Wt 65.2 kg (143 lb 12.8 oz)   BMI 25.23 kg/m    Body mass index is 25.23 kg/m .  Physical Exam  Vitals reviewed.   HENT:      Head: Normocephalic.   Eyes:      Conjunctiva/sclera: Conjunctivae normal.   Cardiovascular:      Pulses: Normal pulses.   Pulmonary:      Effort: Pulmonary effort is normal.   Chest:      Breasts:         Right: Normal. No inverted nipple, mass, skin change or tenderness.         Left: Normal. No swelling, inverted nipple, skin change or tenderness.   Musculoskeletal:         General: Normal range of motion.      Cervical back: Normal range of motion.   Lymphadenopathy:      Upper Body:      Right upper body: No supraclavicular or axillary adenopathy.      Left upper body: No supraclavicular or axillary adenopathy.   Skin:     General: Skin is warm.      Capillary Refill: Capillary refill takes less than 2 seconds.   Neurological:      General: No focal deficit present.      Mental Status: She is alert.   Psychiatric:         Mood and Affect: Mood normal.              Examinations: MA DIAGNOSTIC BILATERAL W/ KANDY, US BREAST RIGHT LIMITED  1-3 QUADRANTS, 6/23/2021 9:36 AM     Comparisons: 9/23/2020, 5/4/2017, 12/29/2014, 11/7/2013, 4/12/2011     History: Right breast pain for 1.5 weeks. No family history of breast  cancer.     BREAST DENSITY: Scattered fibroglandular densities.     Findings:     Mammogram:  Technique: Standard mammographic views were performed " with  tomosynthesis and 2D reconstruction.  No significant interval change     Ultrasound:  Real-time targeted technologist and physician performed sonographic  imaging of the right breast.  At the 12:00 position 2 cm from the nipple at the site of pain, no  suspicious sonographic finding is seen.                                                                      IMPRESSION: BI-RADS CATEGORY: 1 -  Negative.     RECOMMENDED FOLLOW-UP: Annual Mammography.  Given the lack of imaging findings, further management of breast pain  should be based on clinical grounds.      Again, thank you for allowing me to participate in the care of your patient.        Sincerely,        Kia Jean MD

## 2021-07-13 NOTE — PROGRESS NOTES
"    Assessment & Plan   Problem List Items Addressed This Visit        Respiratory    COPD (chronic obstructive pulmonary disease) (H)       Other    S/P shoulder replacement      Other Visit Diagnoses     Right-sided chest wall pain    -  Primary         Patient has right sided chest wall pain.  I reassured the patient that this is unlikely rest pain related as her pain is lateral goes medial into her back and into the right shoulder blade.  Thus this is likely muscular skeletal in nature.  I reviewed the patient's ultrasound and mammogram along with her benign family history of breast cancer, thus I reassured her that this is unlikely due to breast tissue related.  She can try to increase the gabapentin to 200mg 3 times daily see if this will help with this pain.  If it does not within the next 1 to 2 weeks, I recommend that patient be reevaluated and likely undergo physical therapy.    No follow-ups on file.    Kia Jean MD  [facility] TITO Pickens is a 71 year old who presents for the following health issues  accompanied by her cousin:    Right breast - burning shooting pain - feels like pins and needles push outward.  Intermittent and short - pressure with pain will improve the pain.  Been going on for 7 or so weeks.  Sometimes just bending over or changes in position seems to ellicit the pain.  Tried a tighter bra seems to make it worse.  No changes in the skin, no palpable mass.  No nipple change - no inversion, no discharge, no NAC changes.  Pain is generalized and will change around.  Right shoulder blade also started hurting also.   No numbness or tingling into the right arm and no abnormalities of right hand.  No family of breast cancer; never had abnormal mammogram.      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  Right shoulder replacement (in 2015).  Never CVA; never MI.  Does have \"spasm\" of the heart - got her BP meds changed.     " "      Review of Systems   Constitutional, HEENT, cardiovascular, pulmonary, GI, , musculoskeletal, neuro, skin, endocrine and psych systems are negative, except as otherwise noted.      Objective    /74   Temp 97.5  F (36.4  C) (Temporal)   Ht 1.608 m (5' 3.3\")   Wt 65.2 kg (143 lb 12.8 oz)   BMI 25.23 kg/m    Body mass index is 25.23 kg/m .  Physical Exam  Vitals reviewed.   HENT:      Head: Normocephalic.   Eyes:      Conjunctiva/sclera: Conjunctivae normal.   Cardiovascular:      Pulses: Normal pulses.   Pulmonary:      Effort: Pulmonary effort is normal.   Chest:      Breasts:         Right: Normal. No inverted nipple, mass, skin change or tenderness.         Left: Normal. No swelling, inverted nipple, skin change or tenderness.   Musculoskeletal:         General: Normal range of motion.      Cervical back: Normal range of motion.   Lymphadenopathy:      Upper Body:      Right upper body: No supraclavicular or axillary adenopathy.      Left upper body: No supraclavicular or axillary adenopathy.   Skin:     General: Skin is warm.      Capillary Refill: Capillary refill takes less than 2 seconds.   Neurological:      General: No focal deficit present.      Mental Status: She is alert.   Psychiatric:         Mood and Affect: Mood normal.              Examinations: MA DIAGNOSTIC BILATERAL W/ KANDY, US BREAST RIGHT LIMITED  1-3 QUADRANTS, 6/23/2021 9:36 AM     Comparisons: 9/23/2020, 5/4/2017, 12/29/2014, 11/7/2013, 4/12/2011     History: Right breast pain for 1.5 weeks. No family history of breast  cancer.     BREAST DENSITY: Scattered fibroglandular densities.     Findings:     Mammogram:  Technique: Standard mammographic views were performed with  tomosynthesis and 2D reconstruction.  No significant interval change     Ultrasound:  Real-time targeted technologist and physician performed sonographic  imaging of the right breast.  At the 12:00 position 2 cm from the nipple at the site of pain, " no  suspicious sonographic finding is seen.                                                                      IMPRESSION: BI-RADS CATEGORY: 1 -  Negative.     RECOMMENDED FOLLOW-UP: Annual Mammography.  Given the lack of imaging findings, further management of breast pain  should be based on clinical grounds.

## 2021-07-19 ENCOUNTER — TRANSFERRED RECORDS (OUTPATIENT)
Dept: HEALTH INFORMATION MANAGEMENT | Facility: CLINIC | Age: 71
End: 2021-07-19

## 2021-07-21 ENCOUNTER — TELEPHONE (OUTPATIENT)
Dept: FAMILY MEDICINE | Facility: OTHER | Age: 71
End: 2021-07-21

## 2021-07-21 NOTE — TELEPHONE ENCOUNTER
Reason for Call:  Other prescription    Detailed comments: On medication and needs to talk about what to do next. Please call to discuss    Phone Number Patient can be reached at: Home number on file [phone] (home)    Best Time: As soon as possible    Can we leave a detailed message on this number? YES    Call taken on 7/21/2021 at 8:21 AM by Estefanía Hoover

## 2021-07-22 NOTE — TELEPHONE ENCOUNTER
Please verify what dose she is currently taking of Gabapentin and then if possible schedule for a virtual visit on Friday (40 min).     Irma Saez PA-C

## 2021-07-23 ENCOUNTER — VIRTUAL VISIT (OUTPATIENT)
Dept: FAMILY MEDICINE | Facility: OTHER | Age: 71
End: 2021-07-23
Payer: COMMERCIAL

## 2021-07-23 DIAGNOSIS — N64.4 BREAST PAIN, RIGHT: Primary | ICD-10-CM

## 2021-07-23 PROCEDURE — 99441 PR PHYSICIAN TELEPHONE EVALUATION 5-10 MIN: CPT | Mod: 95 | Performed by: PHYSICIAN ASSISTANT

## 2021-07-23 RX ORDER — GABAPENTIN 300 MG/1
300 CAPSULE ORAL 3 TIMES DAILY
Qty: 90 CAPSULE | Refills: 0 | Status: SHIPPED | OUTPATIENT
Start: 2021-07-23 | End: 2021-08-19

## 2021-07-23 NOTE — PROGRESS NOTES
Nelia is a 71 year old who is being evaluated via a billable telephone visit.      What phone number would you like to be contacted at? [phone]  How would you like to obtain your AVS? Mail a copy    Assessment & Plan     Breast pain, right  It is reassuring that when she increased to 200 mg TID that she noticed resolution/improvement of pain therefore feel we are on the right path for treatment.  Increased dose to 300 mg TID, she hasn't had side effects thus far.     If pain was persistent or continues to recur after adjustments in dose consider MRI of the thoracic and lumbar (upper lumbar region potentially implicated) and also consider Pain management referral not sure if this is something an injection could help with or not.  Again still feel that her symptoms do not involve any red flag symptoms or concerns that any surgical management is required at this time.     - gabapentin (NEURONTIN) 300 MG capsule; Take 1 capsule (300 mg) by mouth 3 times daily    Return in about 4 weeks (around 8/20/2021) for Medication Re-check.    Options for treatment and follow-up care were reviewed with the patient and/or guardian. Patient and/or guardian engaged in the decision making process and verbalized understanding of the options discussed and agreed with the final plan.    FATUMA [facility]    Subjective   Nelia is a 71 year old who presents for the following health issues      HPI     Chronic Pain Follow-Up    Where in your body do you have pain? Breast pain right  How has your pain affected your ability to work? Not applicable  Which of these pain treatments have you tried since your last clinic visit? Other: increased medication  How well are you sleeping? varies  How has your mood been since your last visit? About the same  Have you had a significant life event? No  Other aggravating factors: none  Taking medication as directed? No: took 3 100 mg gabapentin capsules this  morning    Saw Dr. Jean on 07/13/2021.  She reassured us all that it is not breast related and likely MSK in nature.     She recommended increasing the Gabapentin to 200 mg TID.  Nelia did this that night and by the end of the day noticed immediate improvement. Wasn't having the sharp pain, no tingling sensations, she was tolerating the increase.     Just recently in the last few days she noticed the symptoms are returning, no new symptoms.     PHQ-9 SCORE 11/4/2013   PHQ-9 Total Score 5     No flowsheet data found.  No flowsheet data found.  Encounter-Level CSA:    There are no encounter-level csa.     Patient-Level CSA:    There are no patient-level csa.               Review of Systems   Constitutional, HEENT, cardiovascular, pulmonary, gi and gu systems are negative, except as otherwise noted.      Objective           Vitals:  No vitals were obtained today due to virtual visit.    Physical Exam   healthy, alert and no distress  PSYCH: Alert and oriented times 3; coherent speech, normal   rate and volume, able to articulate logical thoughts, able   to abstract reason, no tangential thoughts, no hallucinations   or delusions  Her affect is normal  RESP: No cough, no audible wheezing, able to talk in full sentences  Remainder of exam unable to be completed due to telephone visits                Phone call duration: 6:28 minutes

## 2021-08-19 ENCOUNTER — TELEPHONE (OUTPATIENT)
Dept: FAMILY MEDICINE | Facility: OTHER | Age: 71
End: 2021-08-19

## 2021-08-19 DIAGNOSIS — N64.4 BREAST PAIN, RIGHT: ICD-10-CM

## 2021-08-19 RX ORDER — GABAPENTIN 100 MG/1
CAPSULE ORAL
Qty: 90 CAPSULE | Refills: 0 | Status: SHIPPED | OUTPATIENT
Start: 2021-08-19 | End: 2021-08-19 | Stop reason: ALTCHOICE

## 2021-08-19 RX ORDER — GABAPENTIN 300 MG/1
300 CAPSULE ORAL 3 TIMES DAILY
Qty: 42 CAPSULE | Refills: 0 | Status: SHIPPED | OUTPATIENT
Start: 2021-08-19 | End: 2021-09-01

## 2021-08-19 NOTE — TELEPHONE ENCOUNTER
"RN is forwarding this refill request to Isaac Guo as Irma Saez is her PCP, but is out of the office until next week. She called the clinic stating she only has 2 capsules of the 300 mg and 2 of the 100 mg.      \" I missed an evening dose Sunday night as I couldn't remember if I had taken one or not so I didn't take it and boy of boy I have had increased pain in the right breast for the past 3 days. I am almost out and I need a refill on the 300 mg capsules. I still have 2 left of the 100 mg. I was wondering if I could increase the dose a little by adding one of those daily until I can see Irma on 9/1/21?  My Right breast got so sore again by missing the one dose and I feel liek I have pins and needs with jolts of lightening at times like before. Could you ask Isaac Guo if he will fill them for me?  I have seen him before. I use [facility] in [place]. Please let me know if this has been approved. Thank you. [phone] (home) \"    Gabapentin 300 mg capsule  Last Written Prescription Date:  7/23/21  Last Fill Quantity: 90 ( take one 3 times a day) ,  # refills: 0   Last office visit: 7/7/2021 with prescribing provider:  Irma Saez PA-C.    Future Office Visit:   Next 5 appointments (look out 90 days)    Sep 01, 2021  7:40 AM  SHORT with Irma Saez PA-C  [facility] ([facility] ) [address] [place] [id number]  [phone]           Gabapentin 100 mg capsule  Last Written Prescription Date:  7/7/21  Last Fill Quantity: 90,( take 1 capsule 3 times daily fr a week, if tolerating then incrase to 200 mg 3xper day, for one week then increase to 300mg 3x per day, if tolerating.   # refills: 0   Last office visit: 7/7/2021 with prescribing provider:  Irma Saez PA-C   Future Office Visit:   Next 5 appointments (look out 90 days)    Sep 01, 2021  7:40 AM  SHORT with Irma Saez PA-C  [facility] " [facility] ) [address]  [facility] [address]  [phone]       Routing refill request to provider for review/approval because:  Drug not on the [facility] refill protocol   SaranyaRN

## 2021-08-19 NOTE — TELEPHONE ENCOUNTER
Contacted the patient to clarify medication.  She is taking gabapentin 300 mg 3 times daily refilled #42.  Discontinued 100 mg dosing.  Electronically signed:    Isaac Ballesteros PA-C

## 2021-08-19 NOTE — TELEPHONE ENCOUNTER
[facility] pharmacy calling for clarity on the 100 mg tablets of gabapentin.  Are you wanting #90 given to her or just 14 of them to make it to the appointment on 09/01/2021?? And does she take them along with the 300s for 1000mg total daily?    Next 5 appointments (look out 90 days)    Sep 01, 2021  7:40 AM  SHORT with Irma Saez PA-C  [facility] ([facility] ) [address] [place] [id number] [phone]        Please advise.

## 2021-08-31 NOTE — PROGRESS NOTES
{PROVIDER CHARTING PREFERENCE:807577}    Subjective   Nelia is a 71 year old who presents for the following health issues {ACCOMPANIED BY STATEMENT (Optional):141059}    HPI     {SUPERLIST (Optional):293332}  {additonal problems for provider to add (Optional):519524}    Review of Systems   {ROS COMP (Optional):498100}      Objective    There were no vitals taken for this visit.  There is no height or weight on file to calculate BMI.  Physical Exam   {Exam List (Optional):570933}    {Diagnostic Test Results (Optional):557165}    {AMBULATORY ATTESTATION (Optional):903213}

## 2021-09-01 ENCOUNTER — OFFICE VISIT (OUTPATIENT)
Dept: FAMILY MEDICINE | Facility: OTHER | Age: 71
End: 2021-09-01
Payer: COMMERCIAL

## 2021-09-01 VITALS
TEMPERATURE: 98.4 F | WEIGHT: 141.5 LBS | BODY MASS INDEX: 25.07 KG/M2 | OXYGEN SATURATION: 98 % | DIASTOLIC BLOOD PRESSURE: 82 MMHG | SYSTOLIC BLOOD PRESSURE: 128 MMHG | RESPIRATION RATE: 14 BRPM | HEIGHT: 63 IN | HEART RATE: 84 BPM

## 2021-09-01 DIAGNOSIS — Z00.00 ENCOUNTER FOR MEDICARE ANNUAL WELLNESS EXAM: Primary | ICD-10-CM

## 2021-09-01 DIAGNOSIS — J41.0 SIMPLE CHRONIC BRONCHITIS (H): ICD-10-CM

## 2021-09-01 DIAGNOSIS — Z87.891 PERSONAL HISTORY OF TOBACCO USE: ICD-10-CM

## 2021-09-01 DIAGNOSIS — J44.1 COPD EXACERBATION (H): ICD-10-CM

## 2021-09-01 DIAGNOSIS — I20.1 PRINZMETAL ANGINA (H): ICD-10-CM

## 2021-09-01 DIAGNOSIS — N64.4 BREAST PAIN, RIGHT: ICD-10-CM

## 2021-09-01 PROBLEM — F10.20 ALCOHOL DEPENDENCE (H): Status: RESOLVED | Noted: 2021-02-15 | Resolved: 2021-09-01

## 2021-09-01 PROCEDURE — 99213 OFFICE O/P EST LOW 20 MIN: CPT | Mod: 25 | Performed by: PHYSICIAN ASSISTANT

## 2021-09-01 PROCEDURE — G0296 VISIT TO DETERM LDCT ELIG: HCPCS | Performed by: PHYSICIAN ASSISTANT

## 2021-09-01 PROCEDURE — 99406 BEHAV CHNG SMOKING 3-10 MIN: CPT | Performed by: PHYSICIAN ASSISTANT

## 2021-09-01 PROCEDURE — 99397 PER PM REEVAL EST PAT 65+ YR: CPT | Performed by: PHYSICIAN ASSISTANT

## 2021-09-01 RX ORDER — ALBUTEROL SULFATE 90 UG/1
AEROSOL, METERED RESPIRATORY (INHALATION)
Qty: 8.5 G | Refills: 0 | Status: SHIPPED | OUTPATIENT
Start: 2021-09-01 | End: 2021-10-13

## 2021-09-01 RX ORDER — GABAPENTIN 100 MG/1
100 CAPSULE ORAL 3 TIMES DAILY
Qty: 90 CAPSULE | Refills: 1 | Status: SHIPPED | OUTPATIENT
Start: 2021-09-01 | End: 2021-11-02

## 2021-09-01 RX ORDER — GABAPENTIN 300 MG/1
300 CAPSULE ORAL 3 TIMES DAILY
Qty: 90 CAPSULE | Refills: 1 | Status: SHIPPED | OUTPATIENT
Start: 2021-09-01 | End: 2021-11-02

## 2021-09-01 ASSESSMENT — MIFFLIN-ST. JEOR: SCORE: 1140.73

## 2021-09-01 ASSESSMENT — PAIN SCALES - GENERAL: PAINLEVEL: NO PAIN (0)

## 2021-09-01 ASSESSMENT — ACTIVITIES OF DAILY LIVING (ADL): CURRENT_FUNCTION: TRANSPORTATION REQUIRES ASSISTANCE

## 2021-09-01 NOTE — PROGRESS NOTES
The patient was counseled and encouraged to consider modifying their diet and eating habits. She was provided with information on recommended healthy diet options.  The patient reports that she has difficulty with activities of daily living. I have asked that the patient make a follow up appointment in 53 weeks where this issue will be further evaluated and addressed.

## 2021-09-01 NOTE — PATIENT INSTRUCTIONS
- Gabapentin - start taking total 400 mg three times per day (300 capsule + 100 capsule)  - think about the Low dose chest CT for lung cancer screening.   - think about the MRI of the thoracic spine for the breast pain.   - think about the hearing assessment.   Tetanus and Shingle vaccination if you want at the Pharmacy - you can ask insurance if they cover.       Patient Education   Personalized Prevention Plan  You are due for the preventive services outlined below.  Your care team is available to assist you in scheduling these services.  If you have already completed any of these items, please share that information with your care team to update in your medical record.  Health Maintenance Due   Topic Date Due     Zoster (Shingles) Vaccine (1 of 2) Never done     Diptheria Tetanus Pertussis (DTAP/TDAP/TD) Vaccine (2 - Td or Tdap) 03/30/2019     FALL RISK ASSESSMENT  07/15/2021     Flu Vaccine (1) Never done       Understanding [employer] MyPlate  The USDA has guidelines to help you make healthy food choices. These are called MyPlate. MyPlate shows the food groups that make up healthy meals using the image of a place setting. Before you eat, think about the healthiest choices for what to put on your plate or in your cup or bowl. To learn more about building a healthy plate, visit www.choosemyplate.gov.    The food groups    Fruits. Any fruit or 100% fruit juice counts as part of the Fruit Group. Fruits may be fresh, canned, frozen, or dried, and may be whole, cut-up, or pureed. Make 1/2 of your plate fruits and vegetables.    Vegetables. Any vegetable or 100% vegetable juice counts as a member of the Vegetable Group. Vegetables may be fresh, frozen, canned, or dried. They can be served raw or cooked and may be whole, cut-up, or mashed. Make 1/2 of your plate fruits and vegetables.    Grains. All foods made from grains are part of the Grains Group. These include wheat, rice, oats, cornmeal, and barley. Grains are often  used to make foods such as bread, pasta, oatmeal, cereal, tortillas, and grits. Grains should be no more than 1/4 of your plate. At least half of your grains should be whole grains.    Protein. This group includes meat, poultry, seafood, beans and peas, eggs, processed soy products (such as tofu), nuts (including nut butters), and seeds. Make protein choices no more than 1/4 of your plate. Meat and poultry choices should be lean or low fat.    Dairy. The Dairy Group includes all fluid milk products and foods made from milk that contain calcium, such as yogurt and cheese. (Foods that have little calcium, such as cream, butter, and cream cheese, are not part of this group.) Most dairy choices should be low-fat or fat-free.    Oils. Oils aren't a food group, but they do contain essential nutrients. However it's important to watch your intake of oils. These are fats that are liquid at room temperature. They include canola, corn, olive, soybean, vegetable, and sunflower oil. Foods that are mainly oil include mayonnaise, certain salad dressings, and soft margarines. You likely already get your daily oil allowance from the foods you eat.  Things to limit  Eating healthy also means limiting these things in your diet:       Salt (sodium). Many processed foods have a lot of sodium. To keep sodium intake down, eat fresh vegetables, meats, poultry, and seafood when possible. Purchase low-sodium, reduced-sodium, or no-salt-added food products at the store. And don't add salt to your meals at home. Instead, season them with herbs and spices such as dill, oregano, cumin, and paprika. Or try adding flavor with lemon or lime zest and juice.    Saturated fat. Saturated fats are most often found in animal products such as beef, pork, and chicken. They are often solid at room temperature, such as butter. To reduce your saturated fat intake, choose leaner cuts of meat and poultry. And try healthier cooking methods such as grilling,  broiling, roasting, or baking. For a simple lower-fat swap, use plain nonfat yogurt instead of mayonnaise when making potato salad or macaroni salad.    Added sugars. These are sugars added to foods. They are in foods such as ice cream, candy, soda, fruit drinks, sports drinks, energy drinks, cookies, pastries, jams, and syrups. Cut down on added sugars by sharing sweet treats with a family member or friend. You can also choose fruit for dessert, and drink water or other unsweetened beverages.     StayWell last reviewed this educational content on 6/1/2020 2000-2021 [employer]. All rights reserved. This information is not intended as a substitute for professional medical care. Always follow your healthcare professional's instructions.        Activities of Daily Living    Your Health Risk Assessment indicates you have difficulties with activities of daily living such as housework, bathing, preparing meals, taking medication, etc. Please make a follow up appointment for us to address this issue in more detail.     Lung Cancer Screening   Frequently Asked Questions  If you are at high-risk for lung cancer, getting screened with low-dose computed tomography (LDCT) every year can help save your life. This handout offers answers to some of the most common questions about lung cancer screening. If you have other questions, please call [phone] ([phone]).     What is it?  Lung cancer screening uses special X-ray technology to create an image of your lung tissue. The exam is quick and easy and takes less than 10 seconds. We don t give you any medicine or use any needles. You can eat before and after the exam. You don t need to change your clothes as long as the clothing on your chest doesn t contain metal. But, you do need to be able to hold your breath for at least 6 seconds during the exam.    What is the goal of lung cancer screening?  The goal of lung cancer screening is to save lives.  Many times, lung cancer is not found until a person starts having physical symptoms. Lung cancer screening can help detect lung cancer in the earliest stages when it may be easier to treat.    Who should be screened for lung cancer?  We suggest lung cancer screening for anyone who is at high-risk for lung cancer. You are in the high-risk group if you:      are between the ages of 55 and 79, and    have smoked at least 1 pack of cigarettes a day for 30 or more years, and    still smoke or have quit within the past 15 years.    However, if you have a new cough or shortness of breath, you should talk to your doctor before being screened.    Some national lung health advocacy groups also recommend screening for people ages 50 to 79 who have smoked an average of 1 pack of cigarettes a day for 20 years. They must also have at least 1 other risk factor for lung cancer, not including exposure to secondhand smoke. Other risk factors are having had cancer in the past, emphysema, pulmonary fibrosis, COPD, a family history of lung cancer, or exposure to certain materials such as arsenic, asbestos, beryllium, cadmium, chromium, diesel fumes, nickel, radon or silica. Your care team can help you know if you have one of these risk factors.     Why does it matter if I have symptoms?  Certain symptoms can be a sign that you have a condition in your lungs that should be checked and treated by your doctor. These symptoms include fever, chest pain, a new or changing cough, shortness of breath that you have never felt before, coughing up blood or unexplained weight loss. Having any of these symptoms can greatly affect the results of lung cancer screening.       Should all smokers get an LDCT lung cancer screening exam?  It depends. Lung cancer screening is for a very specific group of men and women who have a history of heavy smoking over a long period of time (see  Who should be screened for lung cancer  above).  I am in the high-risk  group, but have been diagnosed with cancer in the past. Is LDCT lung cancer screening right for me?  In some cases, you should not have LDCT lung screening, such as when your doctor is already following your cancer with CT scan studies. Your doctor will help you decide if LDCT lung screening is right for you.  Do I need to have a screening exam every year?  Yes. If you are in the high-risk group described earlier, you should get an LDCT lung cancer screening exam every year until you are 79, or are no longer willing or able to undergo screening and possible procedures to diagnose and treat lung cancer.  How effective is LDCT at preventing death from lung cancer?  Studies have shown that LDCT lung cancer screening can lower the risk of death from lung cancer by 20 percent in people who are at high-risk.  What are the risks?  There are some risks and limitations of LDCT lung cancer screening. We want to make sure you understand the risks and benefits, so please let us know if you have any questions. Your doctor may want to talk with you more about these risks.    Radiation exposure: As with any exam that uses radiation, there is a very small increased risk of cancer. The amount of radiation in LDCT is small--about the same amount a person would get from a mammogram. Your doctor orders the exam when he or she feels the potential benefits outweigh the risks.    False negatives: No test is perfect, including LDCT. It is possible that you may have a medical condition, including lung cancer, that is not found during your exam. This is called a false negative result.    False positives and more testing: LDCT very often finds something in the lung that could be cancer, but in fact is not. This is called a false positive result. False positive tests often cause anxiety. To make sure these findings are not cancer, you may need to have more tests. These tests will be done only if you give us permission. Sometimes patients need  a treatment that can have side effects, such as a biopsy. For more information on false positives, see  What can I expect from the results?     Findings not related to lung cancer: Your LDCT exam also takes pictures of areas of your body next to your lungs. In a very small number of cases, the CT scan will show an abnormal finding in one of these areas, such as your kidneys, adrenal glands, liver or thyroid. This finding may not be serious, but you may need more tests. Your doctor can help you decide what other tests you may need, if any.  What can I expect from the results?  About 1 out of 4 LDCT exams will find something that may need more tests. Most of the time, these findings are lung nodules. Lung nodules are very small collections of tissue in the lung. These nodules are very common, and the vast majority--more than 97 percent--are not cancer (benign). Most are normal lymph nodes or small areas of scarring from past infections.  But, if a small lung nodule is found to be cancer, the cancer can be cured more than 90 percent of the time. To know if the nodule is cancer, we may need to get more images before your next yearly screening exam. If the nodule has suspicious features (for example, it is large, has an odd shape or grows over time), we will refer you to a specialist for further testing.  Will my doctor also get the results?  Yes. Your doctor will get a copy of your results.  Is it okay to keep smoking now that there s a cancer screening exam?  No. Tobacco is one of the strongest cancer-causing agents. It causes not only lung cancer, but other cancers and cardiovascular (heart) diseases as well. The damage caused by smoking builds over time. This means that the longer you smoke, the higher your risk of disease. While it is never too late to quit, the sooner you quit, the better.  Where can I find help to quit smoking?  The best way to prevent lung cancer is to stop smoking. If you have already quit  smoking, congratulations and keep it up! For help on quitting smoking, please call [employer] at 1-693-825-TEBF ([phone]) or the American Cancer Society at 1-754.862.7361 to find local resources near you.  One-on-one health coaching:  If you d prefer to work individually with a health care provider on tobacco cessation, we offer:      Medication Therapy Management:  Our specially trained pharmacists work closely with you and your doctor to help you quit smoking.  Call [phone] or [phone] (toll free).     Can Do: Health coaching offered by [facility].  [email or web address]

## 2021-09-08 ENCOUNTER — HOSPITAL ENCOUNTER (OUTPATIENT)
Dept: CT IMAGING | Facility: CLINIC | Age: 71
Discharge: HOME | End: 2021-09-08
Attending: PHYSICIAN ASSISTANT | Admitting: PHYSICIAN ASSISTANT
Payer: COMMERCIAL

## 2021-09-08 DIAGNOSIS — Z87.891 PERSONAL HISTORY OF TOBACCO USE: ICD-10-CM

## 2021-09-08 PROCEDURE — 71271 CT THORAX LUNG CANCER SCR C-: CPT

## 2021-10-11 DIAGNOSIS — J41.0 SIMPLE CHRONIC BRONCHITIS (H): ICD-10-CM

## 2021-10-11 DIAGNOSIS — J44.1 COPD EXACERBATION (H): ICD-10-CM

## 2021-10-13 RX ORDER — ALBUTEROL SULFATE 90 UG/1
AEROSOL, METERED RESPIRATORY (INHALATION)
Qty: 8.5 G | Refills: 2 | Status: SHIPPED | OUTPATIENT
Start: 2021-10-13 | End: 2022-04-05

## 2021-11-01 DIAGNOSIS — N64.4 BREAST PAIN, RIGHT: ICD-10-CM

## 2021-11-02 RX ORDER — GABAPENTIN 300 MG/1
300 CAPSULE ORAL 3 TIMES DAILY
Qty: 90 CAPSULE | Refills: 0 | Status: SHIPPED | OUTPATIENT
Start: 2021-11-02 | End: 2021-12-06

## 2021-11-02 RX ORDER — GABAPENTIN 100 MG/1
100 CAPSULE ORAL 3 TIMES DAILY
Qty: 90 CAPSULE | Refills: 0 | Status: SHIPPED | OUTPATIENT
Start: 2021-11-02 | End: 2021-12-06

## 2021-11-02 NOTE — TELEPHONE ENCOUNTER
Pending Prescriptions:                       Disp   Refills    gabapentin (NEURONTIN) 300 MG capsule [Pha*90 cap*0        Sig: Take 1 capsule (300 mg) by mouth 3 times daily With           100 mg capsule for total dose of 400 mg three           times daily    gabapentin (NEURONTIN) 100 MG capsule [Pha*90 cap*0        Sig: Take 1 capsule (100 mg) by mouth 3 times daily Take           with 300 mg dose for total of 400 mg three times           daily    Routing refill request to provider for review/approval because:  Drug not on the [facility] refill protocol

## 2021-12-06 DIAGNOSIS — N64.4 BREAST PAIN, RIGHT: ICD-10-CM

## 2021-12-06 RX ORDER — GABAPENTIN 300 MG/1
CAPSULE ORAL
Qty: 90 CAPSULE | Refills: 0 | Status: SHIPPED | OUTPATIENT
Start: 2021-12-06 | End: 2022-11-29

## 2021-12-06 RX ORDER — GABAPENTIN 100 MG/1
CAPSULE ORAL
Qty: 90 CAPSULE | Refills: 0 | Status: SHIPPED | OUTPATIENT
Start: 2021-12-06 | End: 2022-11-29

## 2022-04-05 DIAGNOSIS — J41.0 SIMPLE CHRONIC BRONCHITIS (H): ICD-10-CM

## 2022-04-05 DIAGNOSIS — J44.1 COPD EXACERBATION (H): ICD-10-CM

## 2022-04-05 RX ORDER — ALBUTEROL SULFATE 90 UG/1
AEROSOL, METERED RESPIRATORY (INHALATION)
Qty: 8.5 G | Refills: 0 | Status: SHIPPED | OUTPATIENT
Start: 2022-04-05 | End: 2022-05-27

## 2022-05-27 DIAGNOSIS — J44.1 COPD EXACERBATION (H): ICD-10-CM

## 2022-05-27 DIAGNOSIS — J41.0 SIMPLE CHRONIC BRONCHITIS (H): ICD-10-CM

## 2022-05-27 RX ORDER — ALBUTEROL SULFATE 90 UG/1
AEROSOL, METERED RESPIRATORY (INHALATION)
Qty: 8.5 G | Refills: 0 | Status: SHIPPED | OUTPATIENT
Start: 2022-05-27 | End: 2022-06-22

## 2022-06-22 DIAGNOSIS — J41.0 SIMPLE CHRONIC BRONCHITIS (H): ICD-10-CM

## 2022-06-22 DIAGNOSIS — J44.1 COPD EXACERBATION (H): ICD-10-CM

## 2022-06-22 RX ORDER — ALBUTEROL SULFATE 90 UG/1
AEROSOL, METERED RESPIRATORY (INHALATION)
Qty: 8.5 G | Refills: 0 | Status: SHIPPED | OUTPATIENT
Start: 2022-06-22 | End: 2022-08-01

## 2022-07-13 NOTE — MR AVS SNAPSHOT
"              After Visit Summary   5/17/2017    Love Kaiser    MRN: [id number]           Patient Information     Date Of Birth          1950        Visit Information        Provider Department      5/17/2017 3:00 PM Gauri Kenney MD [facility]        Today's Diagnoses     Closed fracture of multiple ribs of right side with routine healing, subsequent encounter    -  1      Care Instructions    -Deep breaths every 1 hours.   -Limit pain medication to 6 per day. Follow up if your pain is not improving and before you run out of meds.        Follow-ups after your visit        Who to contact     If you have questions or need follow up information about today's clinic visit or your schedule please contact [facility] directly at [phone].  Normal or non-critical lab and imaging results will be communicated to you by [employer], letter or phone within 4 business days after the clinic has received the results. If you do not hear from us within 7 days, please contact the clinic through [employer] or phone. If you have a critical or abnormal lab result, we will notify you by phone as soon as possible.  Submit refill requests through [employer] or call your pharmacy and they will forward the refill request to us. Please allow 3 business days for your refill to be completed.          Additional Information About Your Visit        [employer] Information     [employer] lets you send messages to your doctor, view your test results, renew your prescriptions, schedule appointments and more. To sign up, go to [email or web address] . Click on \"Log in\" on the left side of the screen, which will take you to the Welcome page. Then click on \"Sign up Now\" on the right side of the page.     You will be asked to enter the access code listed below, as well as some personal information. Please follow the directions to create your username and password.     Your access code is: [email or web address]  Expires: " Morning assessment completed, patient denies pain at this time. Reviewed POC, including use of IS & cough & deep breath, use of IS and upper GI this AM. AM meds reviewed, no questions at this time. Hypoactive bowel sounds x4. Abd binder on. needs at this time, call light in reach, will continue to monitor. Fresh ice water with mouth swabs provided. Reminded pt that she will remain NPO until Ebb Servando, NP see's patient late morning. She expressed understanding. The care plan and education has been reviewed and mutually agreed upon with the patient. "7/15/2017  2:01 PM     Your access code will  in 90 days. If you need help or a new code, please call your [facility] or [phone].        Care EveryWhere ID     This is your Care EveryWhere ID. This could be used by other organizations to access your [place] medical records  [id number]        Your Vitals Were     Pulse Temperature Respirations Height Pulse Oximetry BMI (Body Mass Index)    68 99.2  F (37.3  C) (Temporal) 16 5' 3.5\" (1.613 m) 96% 29.64 kg/m2       Blood Pressure from Last 3 Encounters:   17 122/78   17 148/86   17 122/64    Weight from Last 3 Encounters:   17 170 lb (77.1 kg)   17 168 lb (76.2 kg)   17 168 lb (76.2 kg)              Today, you had the following     No orders found for display         Where to get your medicines      Some of these will need a paper prescription and others can be bought over the counter.  Ask your nurse if you have questions.     Bring a paper prescription for each of these medications     oxyCODONE 5 MG IR tablet          Primary Care Provider Office Phone # Fax #    Gauri Mercedez Kenney [phone]       [facility]  [address] [address]        Thank you!     Thank you for choosing [facility]  for your care. Our goal is always to provide you with excellent care. Hearing back from our patients is one way we can continue to improve our services. Please take a few minutes to complete the written survey that you may receive in the mail after your visit with us. Thank you!             Your Updated Medication List - Protect others around you: Learn how to safely use, store and throw away your medicines at www.disposemymeds.org.          This list is accurate as of: 17  3:25 PM.  Always use your most recent med list.                   Brand Name Dispense Instructions for use    albuterol 108 (90 BASE) MCG/ACT Inhaler    albuterol    8.5 g    INHALE 2 PUFFS INTO " THE LUNGS EVERY 4 HOURS AS NEEDED FOR SHORTNESS OF BREATH / DYSPNEA.       ascorbic acid 500 MG tablet    VITAMIN C    90 Tab    ONE TABLET DAILY       aspirin 81 MG tablet     90 tablet    Take 1 tablet (81 mg) by mouth daily       calcium 600 MG tablet      daily       CARTIA  MG 24 hr capsule   Generic drug:  diltiazem      Take 180 mg by mouth daily       cyanocolbalamin 500 MCG tablet    vitamin  B-12     daily       docusate sodium 100 MG capsule    COLACE     Take 100 mg by mouth daily       LIPITOR PO      Take 40 mg by mouth daily       NITROGLYCERIN SL      Place 0.4 mg under the tongue Reported on 5/17/2017       * oxyCODONE 5 MG IR tablet    ROXICODONE    30 tablet    Take 1 tablet (5 mg) by mouth every 4 hours as needed for pain maximum 3 tablet(s) per day       * oxyCODONE 5 MG IR tablet    ROXICODONE    50 tablet    Take 1 tablet (5 mg) by mouth every 4 hours as needed       potassium 75 MG Tabs      1 TABLET  DAILY       Vitamin D-3 Super Strength 2000 UNITS tablet   Generic drug:  cholecalciferol      daily       * Notice:  This list has 2 medication(s) that are the same as other medications prescribed for you. Read the directions carefully, and ask your doctor or other care provider to review them with you.

## 2022-08-01 DIAGNOSIS — J41.0 SIMPLE CHRONIC BRONCHITIS (H): ICD-10-CM

## 2022-08-01 DIAGNOSIS — J44.1 COPD EXACERBATION (H): ICD-10-CM

## 2022-08-01 RX ORDER — ALBUTEROL SULFATE 90 UG/1
AEROSOL, METERED RESPIRATORY (INHALATION)
Qty: 8.5 G | Refills: 0 | Status: SHIPPED | OUTPATIENT
Start: 2022-08-01 | End: 2022-09-12

## 2022-09-11 DIAGNOSIS — J41.0 SIMPLE CHRONIC BRONCHITIS (H): ICD-10-CM

## 2022-09-11 DIAGNOSIS — J44.1 COPD EXACERBATION (H): ICD-10-CM

## 2022-09-12 RX ORDER — ALBUTEROL SULFATE 90 UG/1
AEROSOL, METERED RESPIRATORY (INHALATION)
Qty: 8.5 G | Refills: 0 | Status: SHIPPED | OUTPATIENT
Start: 2022-09-12 | End: 2022-10-12

## 2022-10-12 DIAGNOSIS — J44.1 COPD EXACERBATION (H): ICD-10-CM

## 2022-10-12 DIAGNOSIS — J41.0 SIMPLE CHRONIC BRONCHITIS (H): ICD-10-CM

## 2022-10-12 RX ORDER — ALBUTEROL SULFATE 90 UG/1
AEROSOL, METERED RESPIRATORY (INHALATION)
Qty: 8.5 G | Refills: 0 | Status: SHIPPED | OUTPATIENT
Start: 2022-10-12 | End: 2022-11-17

## 2022-11-17 DIAGNOSIS — J44.1 COPD EXACERBATION (H): ICD-10-CM

## 2022-11-17 DIAGNOSIS — J41.0 SIMPLE CHRONIC BRONCHITIS (H): ICD-10-CM

## 2022-11-17 RX ORDER — ALBUTEROL SULFATE 90 UG/1
AEROSOL, METERED RESPIRATORY (INHALATION)
Qty: 8.5 G | Refills: 0 | Status: SHIPPED | OUTPATIENT
Start: 2022-11-17 | End: 2022-12-21

## 2022-11-29 ENCOUNTER — OFFICE VISIT (OUTPATIENT)
Dept: FAMILY MEDICINE | Facility: OTHER | Age: 72
End: 2022-11-29
Payer: COMMERCIAL

## 2022-11-29 VITALS
BODY MASS INDEX: 21.88 KG/M2 | HEIGHT: 63 IN | WEIGHT: 123.5 LBS | OXYGEN SATURATION: 97 % | DIASTOLIC BLOOD PRESSURE: 72 MMHG | HEART RATE: 83 BPM | SYSTOLIC BLOOD PRESSURE: 138 MMHG | TEMPERATURE: 99 F | RESPIRATION RATE: 12 BRPM

## 2022-11-29 DIAGNOSIS — F17.200 TOBACCO USE DISORDER: ICD-10-CM

## 2022-11-29 DIAGNOSIS — I10 ESSENTIAL HYPERTENSION, BENIGN: ICD-10-CM

## 2022-11-29 DIAGNOSIS — I20.1 PRINZMETAL ANGINA (H): ICD-10-CM

## 2022-11-29 DIAGNOSIS — J41.0 SIMPLE CHRONIC BRONCHITIS (H): ICD-10-CM

## 2022-11-29 DIAGNOSIS — D22.5 ATYPICAL NEVUS OF BACK: ICD-10-CM

## 2022-11-29 DIAGNOSIS — E78.5 HYPERLIPIDEMIA LDL GOAL <160: ICD-10-CM

## 2022-11-29 DIAGNOSIS — Z00.00 ENCOUNTER FOR MEDICARE ANNUAL WELLNESS EXAM: Primary | ICD-10-CM

## 2022-11-29 DIAGNOSIS — L98.9 NODULAR LESION ON SURFACE OF SKIN: ICD-10-CM

## 2022-11-29 DIAGNOSIS — H91.93 HEARING DIFFICULTY OF BOTH EARS: ICD-10-CM

## 2022-11-29 DIAGNOSIS — Z87.891 PERSONAL HISTORY OF TOBACCO USE: ICD-10-CM

## 2022-11-29 LAB
ALBUMIN SERPL-MCNC: 4.3 G/DL (ref 3.4–5)
ALBUMIN UR-MCNC: 100 MG/DL
ALP SERPL-CCNC: 56 U/L (ref 40–150)
ALT SERPL W P-5'-P-CCNC: 17 U/L (ref 0–50)
ANION GAP SERPL CALCULATED.3IONS-SCNC: 4 MMOL/L (ref 3–14)
APPEARANCE UR: CLEAR
AST SERPL W P-5'-P-CCNC: 12 U/L (ref 0–45)
BASOPHILS # BLD AUTO: 0 10E3/UL (ref 0–0.2)
BASOPHILS NFR BLD AUTO: 0 %
BILIRUB SERPL-MCNC: 0.6 MG/DL (ref 0.2–1.3)
BILIRUB UR QL STRIP: NEGATIVE
BUN SERPL-MCNC: 8 MG/DL (ref 7–30)
CALCIUM SERPL-MCNC: 9.3 MG/DL (ref 8.5–10.1)
CHLORIDE BLD-SCNC: 105 MMOL/L (ref 94–109)
CHOLEST SERPL-MCNC: 153 MG/DL
CO2 SERPL-SCNC: 29 MMOL/L (ref 20–32)
COLOR UR AUTO: YELLOW
CREAT SERPL-MCNC: 0.73 MG/DL (ref 0.52–1.04)
CREAT UR-MCNC: 82 MG/DL
EOSINOPHIL # BLD AUTO: 0.1 10E3/UL (ref 0–0.7)
EOSINOPHIL NFR BLD AUTO: 1 %
ERYTHROCYTE [DISTWIDTH] IN BLOOD BY AUTOMATED COUNT: 12.1 % (ref 10–15)
FASTING STATUS PATIENT QL REPORTED: YES
GFR SERPL CREATININE-BSD FRML MDRD: 88 ML/MIN/1.73M2
GLUCOSE BLD-MCNC: 108 MG/DL (ref 70–99)
GLUCOSE UR STRIP-MCNC: NEGATIVE MG/DL
HCT VFR BLD AUTO: 42.5 % (ref 35–47)
HDLC SERPL-MCNC: 87 MG/DL
HGB BLD-MCNC: 14.7 G/DL (ref 11.7–15.7)
HGB UR QL STRIP: ABNORMAL
KETONES UR STRIP-MCNC: NEGATIVE MG/DL
LDLC SERPL CALC-MCNC: 48 MG/DL
LEUKOCYTE ESTERASE UR QL STRIP: ABNORMAL
LYMPHOCYTES # BLD AUTO: 2.1 10E3/UL (ref 0.8–5.3)
LYMPHOCYTES NFR BLD AUTO: 31 %
MCH RBC QN AUTO: 30.9 PG (ref 26.5–33)
MCHC RBC AUTO-ENTMCNC: 34.6 G/DL (ref 31.5–36.5)
MCV RBC AUTO: 90 FL (ref 78–100)
MICROALBUMIN UR-MCNC: 315 MG/L
MICROALBUMIN/CREAT UR: 384.15 MG/G CR (ref 0–25)
MONOCYTES # BLD AUTO: 0.6 10E3/UL (ref 0–1.3)
MONOCYTES NFR BLD AUTO: 9 %
NEUTROPHILS # BLD AUTO: 4.1 10E3/UL (ref 1.6–8.3)
NEUTROPHILS NFR BLD AUTO: 59 %
NITRATE UR QL: NEGATIVE
NONHDLC SERPL-MCNC: 66 MG/DL
PH UR STRIP: 6.5 [PH] (ref 5–7)
PLATELET # BLD AUTO: 277 10E3/UL (ref 150–450)
POTASSIUM BLD-SCNC: 3.7 MMOL/L (ref 3.4–5.3)
PROT SERPL-MCNC: 6.7 G/DL (ref 6.8–8.8)
RBC # BLD AUTO: 4.75 10E6/UL (ref 3.8–5.2)
RBC #/AREA URNS AUTO: ABNORMAL /HPF
SODIUM SERPL-SCNC: 138 MMOL/L (ref 133–144)
SP GR UR STRIP: 1.01 (ref 1–1.03)
SQUAMOUS #/AREA URNS AUTO: ABNORMAL /LPF
TRIGL SERPL-MCNC: 89 MG/DL
UROBILINOGEN UR STRIP-ACNC: 0.2 E.U./DL
WBC # BLD AUTO: 6.9 10E3/UL (ref 4–11)
WBC #/AREA URNS AUTO: ABNORMAL /HPF

## 2022-11-29 PROCEDURE — 80061 LIPID PANEL: CPT | Performed by: PHYSICIAN ASSISTANT

## 2022-11-29 PROCEDURE — 85025 COMPLETE CBC W/AUTO DIFF WBC: CPT | Performed by: PHYSICIAN ASSISTANT

## 2022-11-29 PROCEDURE — 82043 UR ALBUMIN QUANTITATIVE: CPT | Performed by: PHYSICIAN ASSISTANT

## 2022-11-29 PROCEDURE — 99214 OFFICE O/P EST MOD 30 MIN: CPT | Mod: 25 | Performed by: PHYSICIAN ASSISTANT

## 2022-11-29 PROCEDURE — 36415 COLL VENOUS BLD VENIPUNCTURE: CPT | Performed by: PHYSICIAN ASSISTANT

## 2022-11-29 PROCEDURE — G0296 VISIT TO DETERM LDCT ELIG: HCPCS | Performed by: PHYSICIAN ASSISTANT

## 2022-11-29 PROCEDURE — 80053 COMPREHEN METABOLIC PANEL: CPT | Performed by: PHYSICIAN ASSISTANT

## 2022-11-29 PROCEDURE — 81001 URINALYSIS AUTO W/SCOPE: CPT | Performed by: PHYSICIAN ASSISTANT

## 2022-11-29 PROCEDURE — G0439 PPPS, SUBSEQ VISIT: HCPCS | Performed by: PHYSICIAN ASSISTANT

## 2022-11-29 RX ORDER — BUPROPION HYDROCHLORIDE 300 MG/1
300 TABLET ORAL EVERY MORNING
Qty: 90 TABLET | Refills: 1 | Status: SHIPPED | OUTPATIENT
Start: 2022-11-29 | End: 2023-12-12 | Stop reason: SINTOL

## 2022-11-29 RX ORDER — BECLOMETHASONE DIPROPIONATE HFA 40 UG/1
1 AEROSOL, METERED RESPIRATORY (INHALATION) 2 TIMES DAILY
Qty: 10.6 G | Refills: 5 | Status: SHIPPED | OUTPATIENT
Start: 2022-11-29 | End: 2023-12-12

## 2022-11-29 RX ORDER — BUPROPION HYDROCHLORIDE 150 MG/1
150 TABLET ORAL EVERY MORNING
Qty: 7 TABLET | Refills: 0 | Status: SHIPPED | OUTPATIENT
Start: 2022-11-29 | End: 2023-12-12 | Stop reason: SINTOL

## 2022-11-29 RX ORDER — NICOTINE 21 MG/24HR
1 PATCH, TRANSDERMAL 24 HOURS TRANSDERMAL EVERY 24 HOURS
Qty: 30 PATCH | Refills: 1 | Status: SHIPPED | OUTPATIENT
Start: 2022-11-29

## 2022-11-29 ASSESSMENT — ENCOUNTER SYMPTOMS
ABDOMINAL PAIN: 0
NAUSEA: 0
JOINT SWELLING: 0
EYE PAIN: 0
ARTHRALGIAS: 0
DIZZINESS: 0
CHILLS: 0
DIARRHEA: 0
HEMATOCHEZIA: 0
FREQUENCY: 0
PALPITATIONS: 0
BREAST MASS: 0
HEMATURIA: 0
FEVER: 0
SORE THROAT: 0
COUGH: 0
MYALGIAS: 0
WEAKNESS: 0
CONSTIPATION: 1
DYSURIA: 0
PARESTHESIAS: 0
HEADACHES: 1
SHORTNESS OF BREATH: 0
NERVOUS/ANXIOUS: 0
HEARTBURN: 0

## 2022-11-29 ASSESSMENT — ACTIVITIES OF DAILY LIVING (ADL)
CURRENT_FUNCTION: TRANSPORTATION REQUIRES ASSISTANCE
CURRENT_FUNCTION: SHOPPING REQUIRES ASSISTANCE

## 2022-11-29 ASSESSMENT — PAIN SCALES - GENERAL: PAINLEVEL: NO PAIN (0)

## 2022-11-29 NOTE — PROGRESS NOTES
The patient reports that she has difficulty with activities of daily living. I have asked that the patient make a follow up appointment in 53 weeks where this issue will be further evaluated and addressed.  The patient was provided with written information regarding signs of hearing loss.

## 2022-11-29 NOTE — PATIENT INSTRUCTIONS
Patient Education   Personalized Prevention Plan  You are due for the preventive services outlined below.  Your care team is available to assist you in scheduling these services.  If you have already completed any of these items, please share that information with your care team to update in your medical record.  Health Maintenance Due   Topic Date Due     COVID-19 Vaccine (3 - Booster for Moderna series) 06/16/2021     Annual Wellness Visit  07/15/2021     Cholesterol Lab  06/30/2022     Flu Vaccine (1) Never done     LUNG CANCER SCREENING  09/08/2022     Activities of Daily Living    Your Health Risk Assessment indicates you have difficulties with activities of daily living such as housework, bathing, preparing meals, taking medication, etc. Please make a follow up appointment for us to address this issue in more detail.    Signs of Hearing Loss      Hearing much better with one ear can be a sign of hearing loss.   Hearing loss is a problem shared by many people. In fact, it is one of the most common health problems, particularly as people age. Most people age 65 and older have some hearing loss. By age 80, almost everyone does. Hearing loss often occurs slowly over the years. So you may not realize your hearing has gotten worse.  Have your hearing checked  Call your healthcare provider if you:    Have to strain to hear normal conversation    Have to watch other people s faces very carefully to follow what they re saying    Need to ask people to repeat what they ve said    Often misunderstand what people are saying    Turn the volume of the television or radio up so high that others complain    Feel that people are mumbling when they re talking to you    Find that the effort to hear leaves you feeling tired and irritated    Notice, when using the phone, that you hear better with one ear than the other  [employer] last reviewed this educational content on 1/1/2020 2000-2021 [employer]. All rights  reserved. This information is not intended as a substitute for professional medical care. Always follow your healthcare professional's instructions.           Lung Cancer Screening   Frequently Asked Questions  If you are at high-risk for lung cancer, getting screened with low-dose computed tomography (LDCT) every year can help save your life. This handout offers answers to some of the most common questions about lung cancer screening. If you have other questions, please call [phone] ([phone]).     What is it?  Lung cancer screening uses special X-ray technology to create an image of your lung tissue. The exam is quick and easy and takes less than 10 seconds. We don t give you any medicine or use any needles. You can eat before and after the exam. You don t need to change your clothes as long as the clothing on your chest doesn t contain metal. But, you do need to be able to hold your breath for at least 6 seconds during the exam.    What is the goal of lung cancer screening?  The goal of lung cancer screening is to save lives. Many times, lung cancer is not found until a person starts having physical symptoms. Lung cancer screening can help detect lung cancer in the earliest stages when it may be easier to treat.    Who should be screened for lung cancer?  We suggest lung cancer screening for anyone who is at high-risk for lung cancer. You are in the high-risk group if you:      are between the ages of 55 and 79, and    have smoked at least 1 pack of cigarettes a day for 20 or more years, and    still smoke or have quit within the past 15 years.    However, if you have a new cough or shortness of breath, you should talk to your doctor before being screened.    Why does it matter if I have symptoms?  Certain symptoms can be a sign that you have a condition in your lungs that should be checked and treated by your doctor. These symptoms include fever, chest pain, a new or changing cough, shortness of  breath that you have never felt before, coughing up blood or unexplained weight loss. Having any of these symptoms can greatly affect the results of lung cancer screening.       Should all smokers get an LDCT lung cancer screening exam?  It depends. Lung cancer screening is for a very specific group of men and women who have a history of heavy smoking over a long period of time (see  Who should be screened for lung cancer  above).  I am in the high-risk group, but have been diagnosed with cancer in the past. Is LDCT lung cancer screening right for me?  In some cases, you should not have LDCT lung screening, such as when your doctor is already following your cancer with CT scan studies. Your doctor will help you decide if LDCT lung screening is right for you.  Do I need to have a screening exam every year?  Yes. If you are in the high-risk group described earlier, you should get an LDCT lung cancer screening exam every year until you are 79, or are no longer willing or able to undergo screening and possible procedures to diagnose and treat lung cancer.  How effective is LDCT at preventing death from lung cancer?  Studies have shown that LDCT lung cancer screening can lower the risk of death from lung cancer by 20 percent in people who are at high-risk.  What are the risks?  There are some risks and limitations of LDCT lung cancer screening. We want to make sure you understand the risks and benefits, so please let us know if you have any questions. Your doctor may want to talk with you more about these risks.    Radiation exposure: As with any exam that uses radiation, there is a very small increased risk of cancer. The amount of radiation in LDCT is small--about the same amount a person would get from a mammogram. Your doctor orders the exam when he or she feels the potential benefits outweigh the risks.    False negatives: No test is perfect, including LDCT. It is possible that you may have a medical condition,  including lung cancer, that is not found during your exam. This is called a false negative result.    False positives and more testing: LDCT very often finds something in the lung that could be cancer, but in fact is not. This is called a false positive result. False positive tests often cause anxiety. To make sure these findings are not cancer, you may need to have more tests. These tests will be done only if you give us permission. Sometimes patients need a treatment that can have side effects, such as a biopsy. For more information on false positives, see  What can I expect from the results?     Findings not related to lung cancer: Your LDCT exam also takes pictures of areas of your body next to your lungs. In a very small number of cases, the CT scan will show an abnormal finding in one of these areas, such as your kidneys, adrenal glands, liver or thyroid. This finding may not be serious, but you may need more tests. Your doctor can help you decide what other tests you may need, if any.  What can I expect from the results?  About 1 out of 4 LDCT exams will find something that may need more tests. Most of the time, these findings are lung nodules. Lung nodules are very small collections of tissue in the lung. These nodules are very common, and the vast majority--more than 97 percent--are not cancer (benign). Most are normal lymph nodes or small areas of scarring from past infections.  But, if a small lung nodule is found to be cancer, the cancer can be cured more than 90 percent of the time. To know if the nodule is cancer, we may need to get more images before your next yearly screening exam. If the nodule has suspicious features (for example, it is large, has an odd shape or grows over time), we will refer you to a specialist for further testing.  Will my doctor also get the results?  Yes. Your doctor will get a copy of your results.  Is it okay to keep smoking now that there s a cancer screening exam?  No.  Tobacco is one of the strongest cancer-causing agents. It causes not only lung cancer, but other cancers and cardiovascular (heart) diseases as well. The damage caused by smoking builds over time. This means that the longer you smoke, the higher your risk of disease. While it is never too late to quit, the sooner you quit, the better.  Where can I find help to quit smoking?  The best way to prevent lung cancer is to stop smoking. If you have already quit smoking, congratulations and keep it up! For help on quitting smoking, please call [employer] at 5-824-QUITNOW (1-252.766.1101) or the [employer] at 1-438.480.3416 to find local resources near you.  One-on-one health coaching:  If you d prefer to work individually with a health care provider on tobacco cessation, we offer:      Medication Therapy Management:  Our specially trained pharmacists work closely with you and your doctor to help you quit smoking.  Call [phone] or [phone] (toll free).

## 2022-11-29 NOTE — PROGRESS NOTES
"SUBJECTIVE:       Nelia is a 72 year old who presents for Preventive Visit.    Are you in the first 12 months of your Medicare coverage?  No    Healthy Habits:     In general, how would you rate your overall health?  Good    Frequency of exercise:  2-3 days/week    Duration of exercise:  15-30 minutes    Do you usually eat at least 4 servings of fruit and vegetables a day, include whole grains    & fiber and avoid regularly eating high fat or \"junk\" foods?  Yes    Taking medications regularly:  Yes    Medication side effects:  None    Ability to successfully perform activities of daily living:  Transportation requires assistance and shopping requires assistance    Home Safety:  No safety concerns identified    Hearing Impairment:  Difficulty following a conversation in a noisy restaurant or crowded room and difficulty understanding soft or whispered speech    In the past 6 months, have you been bothered by leaking of urine?  No    In general, how would you rate your overall mental or emotional health?  Good      PHQ-2 Total Score: 0    Additional concerns today:  No      Have you ever done Advance Care Planning? (For example, a Health Directive, POLST, or a discussion with a medical provider or your loved ones about your wishes): Yes, patient states has an Advance Care Planning document and will bring a copy to the clinic.       Fall risk  Fallen 2 or more times in the past year?: No  Any fall with injury in the past year?: No    Cognitive Screening   1) Repeat 3 items (Leader, Season, Table)    2) Clock draw: Unable to complete due to vision: Blind  3) 3 item recall: Recalls 3 objects  Results: 3 items recalled: COGNITIVE IMPAIRMENT LESS LIKELY    Mini-CogTM Copyright MARLENY Goodson. Licensed by the author for use in [facility]; reprinted with permission (lance@.[facility]). All rights reserved.          Reviewed and updated as needed this visit by clinical staff   Tobacco  Allergies  Meds  Problems         "     Reviewed and updated as needed this visit by Provider    Allergies  Meds  Problems            Social History     Tobacco Use     Smoking status: Every Day     Packs/day: 1.00     Years: 31.00     Pack years: 31.00     Types: Cigarettes     Smokeless tobacco: Never   Substance Use Topics     Alcohol use: No     Alcohol/week: 23.3 standard drinks     Comment: quit drinking         Alcohol Use 11/29/2022   Prescreen: >3 drinks/day or >7 drinks/week? Not Applicable   Prescreen: >3 drinks/day or >7 drinks/week? -               Current providers sharing in care for this patient include:   Patient Care Team:  Irma Saez PA-C as PCP - General (Family Medicine)  Irma Saez PA-C as Assigned PCP  Jean, MD Kia as Assigned Surgical Provider    The following health maintenance items are reviewed in Epic and correct as of today:  Health Maintenance   Topic Date Due     COVID-19 Vaccine (3 - Booster for Moderna series) 06/16/2021     MEDICARE ANNUAL WELLNESS VISIT  07/15/2021     LIPID  06/30/2022     INFLUENZA VACCINE (1) Never done     LUNG CANCER SCREENING  09/08/2022     MAMMO SCREENING  06/23/2023     NICOTINE/TOBACCO CESSATION COUNSELING Q 1 YR  11/29/2023     ANNUAL REVIEW OF HM ORDERS  11/29/2023     FALL RISK ASSESSMENT  11/29/2023     COLORECTAL CANCER SCREENING  07/24/2024     ADVANCE CARE PLANNING  11/29/2027     DTAP/TDAP/TD IMMUNIZATION (3 - Td or Tdap) 09/03/2031     DEXA  06/06/2034     SPIROMETRY  Completed     HEPATITIS C SCREENING  Completed     COPD ACTION PLAN  Completed     PHQ-2 (once per calendar year)  Completed     Pneumococcal Vaccine: 65+ Years  Completed     ZOSTER IMMUNIZATION  Completed     IPV IMMUNIZATION  Aged Out     MENINGITIS IMMUNIZATION  Aged Out     BP Readings from Last 3 Encounters:   11/29/22 138/72   09/01/21 128/82   07/13/21 126/74    Wt Readings from Last 3 Encounters:   11/29/22 56 kg (123 lb 8 oz)   09/01/21 64.2 kg (141 lb 8 oz)   07/13/21 65.2 kg (143 lb 12.8  oz)                  Patient Active Problem List   Diagnosis     Esophageal reflux     Acute gastritis     Slow transit constipation     Osteoporosis     COPD (chronic obstructive pulmonary disease) (H)     Pneumonia     Atopic rhinitis     Hyperlipidemia LDL goal <160     Health Care Home     Hx of colonic polyp     Adenomatous polyp     Advanced directives, counseling/discussion     S/P shoulder replacement     Essential hypertension, benign     Prinzmetal angina (H)     Hyperlipidemia LDL goal <70     Intermediate stage nonexudative age-related macular degeneration of both eyes     Closed Colles' fracture of left radius, initial encounter     Legally blind     Past Surgical History:   Procedure Laterality Date     ARTHROPLASTY SHOULDER Right 5/26/2015    Procedure: ARTHROPLASTY SHOULDER;  Surgeon: Ashutosh Andrews DO;  Location: [facility] OR     CL AFF SURGICAL PATHOLOGY  1974    parotid tumor with malignancy     COLONOSCOPY  09/21/09     COLONOSCOPY N/A 7/6/2016    Procedure: COMBINED COLONOSCOPY, SINGLE OR MULTIPLE BIOPSY/POLYPECTOMY BY BIOPSY;  Surgeon: John Bellamy MD;  Location: [facility]     COLONOSCOPY N/A 7/24/2019    Procedure: COLONOSCOPY;  Surgeon: Kia Jean MD;  Location: [facility]     EXCISE MASS FOOT  1/17/2012    Procedure:EXCISE MASS FOOT; Excision of Mass Right Foot; Surgeon:CARRIE PAGAN; Location:[facility] OR     FOREIGN BODY REMOVAL  07/08/10    Soft tissue mass w/peripheral metal fragments     [facility] UGI ENDOSCOPY DIAG W BIOPSY  11/22/06     HYSTERECTOMY, PAP NO LONGER INDICATED       HYSTERECTOMY, FIDE       OPEN REDUCTION INTERNAL FIXATION WRIST Left 2/1/2019    Procedure: Left distal radius open reduction and internal fixation;  Surgeon: Ashutosh Andrews DO;  Location: [facility] OR     ZZC REMOVAL OF OVARY(S)       ZZHC COLONOSCOPY W BIOPSY  11/22/06     ZZHC COLONOSCOPY W BIOPSY  10/31/07       Social History     Tobacco Use     Smoking status: Every Day     Packs/day: 1.00      Years: 31.00     Pack years: 31.00     Types: Cigarettes     Smokeless tobacco: Never   Substance Use Topics     Alcohol use: No     Alcohol/week: 23.3 standard drinks     Comment: quit drinking     Family History   Problem Relation Age of Onset     Arthritis Mother         RA     Cancer Mother         female organs     Cancer Father         colon         Current Outpatient Medications   Medication Sig Dispense Refill     beclomethasone HFA (QVAR REDIHALER) 40 MCG/ACT inhaler Inhale 1 puff into the lungs 2 times daily 10.6 g 5     buPROPion (WELLBUTRIN XL) 150 MG 24 hr tablet Take 1 tablet (150 mg) by mouth every morning For a week then increase to the 300 mg tablet. 7 tablet 0     buPROPion (WELLBUTRIN XL) 300 MG 24 hr tablet Take 1 tablet (300 mg) by mouth every morning 90 tablet 1     nicotine (NICODERM CQ) 14 MG/24HR 24 hr patch Place 1 patch onto the skin every 24 hours 30 patch 1     albuterol (PROAIR HFA/PROVENTIL HFA/VENTOLIN HFA) 108 (90 Base) MCG/ACT inhaler INHALE ONE OR TWO PUFFS BY MOUTH EVERY FOUR HOURS AS NEEDED 8.5 g 0     aspirin 81 MG tablet Take 1 tablet (81 mg) by mouth daily 90 tablet 3     Atorvastatin Calcium (LIPITOR PO) Take 40 mg by mouth daily       CARTIA  MG 24 hr capsule Take 180 mg by mouth daily  8     NITROGLYCERIN SL Place 0.4 mg under the tongue Reported on 5/17/2017 (Patient not taking: Reported on 7/13/2021)       POTASSIUM 75 MG OR TABS 1 TABLET  DAILY       VITAMIN B-12 500 MCG OR TABS daily       VITAMIN C 500 MG OR TABS ONE TABLET DAILY 90 Tab 3     VITAMIN D-3 SUPER STRENGTH 2000 UNIT OR TABS daily       Allergies   Allergen Reactions     Nickel Itching     No Known Drug Allergies              Review of Systems   Constitutional: Negative for chills and fever.   HENT: Negative for congestion, ear pain, hearing loss and sore throat.    Eyes: Positive for visual disturbance. Negative for pain.   Respiratory: Negative for cough and shortness of breath.    Cardiovascular:  "Negative for chest pain, palpitations and peripheral edema.   Gastrointestinal: Positive for constipation. Negative for abdominal pain, diarrhea, heartburn, hematochezia and nausea.   Breasts:  Negative for tenderness, breast mass and discharge.   Genitourinary: Negative for dysuria, frequency, genital sores, hematuria, pelvic pain, urgency, vaginal bleeding and vaginal discharge.   Musculoskeletal: Negative for arthralgias, joint swelling and myalgias.   Skin: Negative for rash.   Neurological: Positive for headaches. Negative for dizziness, weakness and paresthesias.   Psychiatric/Behavioral: Negative for mood changes. The patient is not nervous/anxious.          OBJECTIVE:   /72   Pulse 83   Temp 99  F (37.2  C) (Temporal)   Resp 12   Ht 1.595 m (5' 2.8\")   Wt 56 kg (123 lb 8 oz)   SpO2 97%   BMI 22.02 kg/m   Estimated body mass index is 22.02 kg/m  as calculated from the following:    Height as of this encounter: 1.595 m (5' 2.8\").    Weight as of this encounter: 56 kg (123 lb 8 oz).  Physical Exam  GENERAL: healthy, alert and no distress  EYES: Eyes grossly normal to inspection, PERRL and conjunctivae and sclerae normal  HENT: ear canals and TM's normal, nose and mouth without ulcers or lesions  NECK: no adenopathy, no asymmetry, masses, or scars and thyroid normal to palpation  RESP: lungs clear to auscultation - no rales, rhonchi or wheezes  CV: regular rate and rhythm, normal S1 S2, no S3 or S4, no murmur, click or rub, no peripheral edema and peripheral pulses strong  ABDOMEN: soft, nontender, no hepatosplenomegaly, no masses and bowel sounds normal  MS: no gross musculoskeletal defects noted, no edema  SKIN: Left cheek there is a slightly nodular or cystic lesion.  Left side back 5 mm rough oval shaped lesion that is general skin colored but medially there is area of dark pigmentation.   NEURO: Normal strength and tone, mentation intact and speech normal  PSYCH: mentation appears normal, " affect normal/bright    Diagnostic Test Results:  Labs reviewed in Epic  Results for orders placed or performed in visit on 11/29/22 (from the past 24 hour(s))   UA Macro with Reflex to Micro and Culture - lab collect    Specimen: Urine, NOS   Result Value Ref Range    Color Urine Yellow Colorless, Straw, Light Yellow, Yellow    Appearance Urine Clear Clear    Glucose Urine Negative Negative mg/dL    Bilirubin Urine Negative Negative    Ketones Urine Negative Negative mg/dL    Specific Gravity Urine 1.010 1.003 - 1.035    Blood Urine Trace (A) Negative    pH Urine 6.5 5.0 - 7.0    Protein Albumin Urine 100 (A) Negative mg/dL    Urobilinogen Urine 0.2 0.2, 1.0 E.U./dL    Nitrite Urine Negative Negative    Leukocyte Esterase Urine Trace (A) Negative   CBC with platelets and differential    Narrative    The following orders were created for panel order CBC with platelets and differential.  Procedure                               Abnormality         Status                     ---------                               -----------         ------                     CBC with platelets and d...[[id number]]                      Final result                 Please view results for these tests on the individual orders.   CBC with platelets and differential   Result Value Ref Range    WBC Count 6.9 4.0 - 11.0 10e3/uL    RBC Count 4.75 3.80 - 5.20 10e6/uL    Hemoglobin 14.7 11.7 - 15.7 g/dL    Hematocrit 42.5 35.0 - 47.0 %    MCV 90 78 - 100 fL    MCH 30.9 26.5 - 33.0 pg    MCHC 34.6 31.5 - 36.5 g/dL    RDW 12.1 10.0 - 15.0 %    Platelet Count 277 150 - 450 10e3/uL    % Neutrophils 59 %    % Lymphocytes 31 %    % Monocytes 9 %    % Eosinophils 1 %    % Basophils 0 %    Absolute Neutrophils 4.1 1.6 - 8.3 10e3/uL    Absolute Lymphocytes 2.1 0.8 - 5.3 10e3/uL    Absolute Monocytes 0.6 0.0 - 1.3 10e3/uL    Absolute Eosinophils 0.1 0.0 - 0.7 10e3/uL    Absolute Basophils 0.0 0.0 - 0.2 10e3/uL   Urine Microscopic   Result Value Ref Range     RBC Urine 0-2 0-2 /HPF /HPF    WBC Urine 0-5 0-5 /HPF /HPF    Squamous Epithelials Urine Few (A) None Seen /LPF    Narrative    Urine Culture not indicated       ASSESSMENT / PLAN:       ICD-10-CM    1. Encounter for Medicare annual wellness exam  Z00.00       2. Hyperlipidemia LDL goal <160  E78.5       3. Prinzmetal angina (H)  I20.1 Lipid panel reflex to direct LDL Non-fasting     Comprehensive metabolic panel (BMP + Alb, Alk Phos, ALT, AST, Total. Bili, TP)     CBC with platelets and differential     CBC with platelets and differential     Comprehensive metabolic panel (BMP + Alb, Alk Phos, ALT, AST, Total. Bili, TP)     Lipid panel reflex to direct LDL Non-fasting      4. Simple chronic bronchitis (H)  J41.0 CBC with platelets and differential     CBC with platelets and differential     beclomethasone HFA (QVAR REDIHALER) 40 MCG/ACT inhaler      5. Essential hypertension, benign  I10 Albumin Random Urine Quantitative with Creat Ratio     UA Macro with Reflex to Micro and Culture - lab collect     UA Macro with Reflex to Micro and Culture - lab collect     Albumin Random Urine Quantitative with Creat Ratio     Urine Microscopic      6. Personal history of tobacco use  Z87.891 Prof fee: Shared Decision Making for Lung Cancer Screening     CT Chest Lung Cancer Scrn Low Dose wo     SMOKING CESSATION COUNSELING 3-10 MIN      7. Nodular lesion on surface of skin  L98.9 Adult Dermatology Referral      8. Atypical nevus of back  D22.5 Adult Dermatology Referral      9. Hearing difficulty of both ears  H91.93 Adult Audiology [facility] hospital Referral      10. Tobacco use disorder  F17.200 buPROPion (WELLBUTRIN XL) 150 MG 24 hr tablet     buPROPion (WELLBUTRIN XL) 300 MG 24 hr tablet     nicotine (NICODERM CQ) 14 MG/24HR 24 hr patch     Prof fee: Shared Decision Making for Lung Cancer Screening     CT Chest Lung Cancer Scrn Low Dose wo     SMOKING CESSATION COUNSELING 3-10 MIN        HLD, Angina:  - Updated labs.   - The  10-year ASCVD risk score (Lisa LINN, et al., 2019) is: 20.3%    Values used to calculate the score:      Age: 72 years      Sex: Female      Is Non- : No      Diabetic: No      Tobacco smoker: Yes      Systolic Blood Pressure: 138 mmHg      Is BP treated: Yes      HDL Cholesterol: 67 mg/dL      Total Cholesterol: 149 mg/dL  - Patient on Atorvastatin.   - Sees Cardiology yearly.     COPD:  - Noticing more need for her albuterol inhaler.   - Started her on Qvar daily to see if this helps improves her symptoms, continue with albuterol PRN in the meantime.   - Follow-up in 3-6 months pending how she is doing.     HTN:  - Stable.     Tobacco use history:  - She is ready to quit. She had adverse reactions to Chantix in the past  - Will try Wellbutrin, we discussed potential side effects including black box warning. Will start on Wellbutrin 150 mg once daily for a week then increase to 300 mg daily. Sounds like she didn't tolerate nicotine replacement in the past orally will try topical patch.   - Due for her CT Scan.     Skin lesios:  - Facial one is new and can be painful if touching it, referral placed to dermatology, there is also an atypical lesion on left back would like Dermatology to evaluate, can always biopsy in clinic if needed.     Difficulty hearing:  - Noticing more difficulties.   - Referral to Audiology.     Patient has been advised of split billing requirements and indicates understanding: Yes      COUNSELING:  Reviewed preventive health counseling, as reflected in patient instructions        She reports that she has been smoking cigarettes. She has a 31.00 pack-year smoking history. She has never used smokeless tobacco.  Nicotine/Tobacco Cessation Plan:   Pharmacotherapies : bupropion (Zyban)      Appropriate preventive services were discussed with this patient, including applicable screening as appropriate for cardiovascular disease, diabetes, osteopenia/osteoporosis, and  glaucoma.  As appropriate for age/gender, discussed screening for colorectal cancer, prostate cancer, breast cancer, and cervical cancer. Checklist reviewing preventive services available has been given to the patient.    Reviewed patients plan of care and provided an AVS. The Intermediate Care Plan ( asthma action plan, low back pain action plan, and migraine action plan) for Love meets the Care Plan requirement. This Care Plan has been established and reviewed with the Patient.      Irma Saez PA-C  [facility]    Identified Health Risks:  Lung Cancer Screening Shared Decision Making Visit     Love Kaiser, a 72 year old female, is eligible for lung cancer screening    History   Smoking Status     Every Day     Packs/day: 1.00     Years: 31.00     Types: Cigarettes   Smokeless Tobacco     Never       I have discussed with patient the risks and benefits of screening for lung cancer with low-dose CT.     The risks include:    radiation exposure: one low dose chest CT has as much ionizing radiation as about 15 chest x-rays, or 6 months of background radiation living in [place]      false positives: most findings/nodules are NOT cancer, but some might still require additional diagnostic evaluation, including biopsy    over-diagnosis: some slow growing cancers that might never have been clinically significant will be detected and treated unnecessarily     The benefit of early detection of lung cancer is contingent upon adherence to annual screening or more frequent follow up if indicated.     Furthermore, to benefit from screening, Love must be willing and able to undergo diagnostic procedures, if indicated. Although no specific guide is available for determining severity of comorbidities, it is reasonable to withhold screening in patients who have greater mortality risk from other diseases.     We did discuss that the best way to prevent lung cancer is to not smoke.    Some  patients may value a numeric estimation of lung cancer risk when evaluating if lung cancer screening is right for them, here is one calculator:    ShouldIScreen

## 2022-12-05 ENCOUNTER — HOSPITAL ENCOUNTER (OUTPATIENT)
Dept: CT IMAGING | Facility: CLINIC | Age: 72
Discharge: HOME | End: 2022-12-05
Attending: PHYSICIAN ASSISTANT | Admitting: PHYSICIAN ASSISTANT
Payer: COMMERCIAL

## 2022-12-05 DIAGNOSIS — Z87.891 PERSONAL HISTORY OF TOBACCO USE: ICD-10-CM

## 2022-12-05 DIAGNOSIS — F17.200 TOBACCO USE DISORDER: ICD-10-CM

## 2022-12-05 PROCEDURE — 71271 CT THORAX LUNG CANCER SCR C-: CPT

## 2022-12-06 ENCOUNTER — TELEPHONE (OUTPATIENT)
Dept: FAMILY MEDICINE | Facility: OTHER | Age: 72
End: 2022-12-06

## 2022-12-06 NOTE — TELEPHONE ENCOUNTER
----- Message from Irma Saez PA-C sent at 12/6/2022  6:25 AM CST -----  Please call patient. Her CT scan showed the emphysema changes again.  Will continue yearly screening, no other changes or concerns at this time. Continue to see the plaque in her arteries of her heart so continue on cholesterol medications.     Irma Saez PA-C

## 2022-12-21 DIAGNOSIS — J44.1 COPD EXACERBATION (H): ICD-10-CM

## 2022-12-21 DIAGNOSIS — J41.0 SIMPLE CHRONIC BRONCHITIS (H): ICD-10-CM

## 2022-12-21 RX ORDER — ALBUTEROL SULFATE 90 UG/1
AEROSOL, METERED RESPIRATORY (INHALATION)
Qty: 8.5 G | Refills: 0 | Status: SHIPPED | OUTPATIENT
Start: 2022-12-21 | End: 2023-03-10

## 2023-02-13 ENCOUNTER — OFFICE VISIT (OUTPATIENT)
Dept: AUDIOLOGY | Facility: OTHER | Age: 73
End: 2023-02-13
Attending: PHYSICIAN ASSISTANT
Payer: COMMERCIAL

## 2023-02-13 DIAGNOSIS — H91.93 HEARING DIFFICULTY OF BOTH EARS: ICD-10-CM

## 2023-02-13 DIAGNOSIS — H93.13 TINNITUS OF BOTH EARS: ICD-10-CM

## 2023-02-13 DIAGNOSIS — H90.3 SENSORINEURAL HEARING LOSS, BILATERAL: Primary | ICD-10-CM

## 2023-02-13 PROCEDURE — 99207 PR NO CHARGE LOS: CPT | Performed by: AUDIOLOGIST

## 2023-02-13 PROCEDURE — 92557 COMPREHENSIVE HEARING TEST: CPT | Performed by: AUDIOLOGIST

## 2023-02-13 PROCEDURE — 92550 TYMPANOMETRY & REFLEX THRESH: CPT | Performed by: AUDIOLOGIST

## 2023-02-13 NOTE — PROGRESS NOTES
AUDIOLOGY REPORT    SUBJECTIVE:  Love Kaiser is a 72 year old female who was seen in the Audiology Clinic at the [facility] for audiologic evaluation, referred by Irma Saez PA-C. The patient reports that she has had to turn the TV volume up and her family has expressed concern about her hearing. She also reports constant bilateral tinnitus. She reports that she sometimes has a plugged feeling in her ears, and her hearing gets better for a short period of time if she pops her ears. The patient also reports a bump behind her left ear that is painful if she presses on it. 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 This affected her hearing in the right ear for several years. The patient also reports a history of TMJ surgery. 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 She reports a family history of hearing loss attributed to noise exposure. The patient reports that she does not have ear pain. The patient was unaccompanied to today's appointment.     OBJECTIVE:  Otoscopic exam indicates ears are clear of cerumen bilaterally. A bump is visible behind the left ear.     Pure Tone Thresholds assessed using conventional audiometry with good  reliability from 250-8000 Hz bilaterally using insert earphones and circumaural headphones     RIGHT:  normal hearing sensitivity at 250 Hz sloping to mild to severe sensorineural hearing loss    LEFT:    normal hearing sensitivity at 250 Hz sloping to mild to moderately severe sensorineural hearing loss    Tympanogram:    RIGHT: hypercompliant eardrum mobility    LEFT:   normal eardrum mobility (borderline hypercompliant)    Reflexes (reported by stimulus ear):  RIGHT: Ipsilateral is present at normal levels  RIGHT: Contralateral is absent at frequencies tested  LEFT:   Ipsilateral is present at normal levels  LEFT:   Contralateral  is present at normal levels    Speech Reception Threshold:    RIGHT: 40 dB HL    LEFT:   25 dB HL    Word Recognition Score:     RIGHT: 88% at 75 dB HL using NU-6 recorded word list.      96% at 80 dB HL using NU-6 recorded word list.    LEFT:   96% at 75 dB HL using NU-6 recorded word list.      ASSESSMENT:     ICD-10-CM    1. Sensorineural hearing loss, bilateral  H90.3 Cmpr[facility] Audiometry Thrshld Eval & Speech Recog (13690)     Tymps / Reflex   ([id number])      2. Hearing difficulty of both ears  H91.93 Adult Audiology  Referral      3. Tinnitus of both ears  H93.13 C[facility]pr[facility] Audiometry Thrshld Eval & Speech Recog ([id number])     Tymps / Reflex   ([id number])          Today s results were discussed with the patient in detail.     PLAN:  Patient was counseled regarding hearing loss and impact on communication. Patient is a good candidate for amplification at this time. Discussed options for amplification, and that the patient likely has a third party benefit that cannot be used at this clinic. She was encouraged to look into what is available through her insurance, and she was given a copy of her audiogram. Discussed that she could return here for amplification, though it would likely be out of pocket. Discussed the option for over the counter hearing aids, though thresholds are slightly outside of the recommended fitting range (mild to moderate).    The patient was encouraged to discuss the bump behind her ear with her primary care provider, though she mentioned that she has an upcoming appointment with dermatology for other skin concerns, so that would be another option for follow up. It is recommended that the patient return as needed.  Please call this clinic with questions regarding these results or recommendations.      Sanchez Marquez, CCC-A  [place] Licensed Audiologist #[id number]  2/13/2023

## 2023-02-13 NOTE — Clinical Note
Nelia was concerned about a bump behind her left ear, which I could see today. I encouraged her to bring it up to you, but she mentioned an upcoming appointment and it looks like she is already scheduled with dermatology.

## 2023-03-10 DIAGNOSIS — J41.0 SIMPLE CHRONIC BRONCHITIS (H): ICD-10-CM

## 2023-03-10 DIAGNOSIS — J44.1 COPD EXACERBATION (H): ICD-10-CM

## 2023-03-10 RX ORDER — ALBUTEROL SULFATE 90 UG/1
AEROSOL, METERED RESPIRATORY (INHALATION)
Qty: 8.5 G | Refills: 0 | Status: SHIPPED | OUTPATIENT
Start: 2023-03-10 | End: 2023-08-21

## 2023-04-10 ENCOUNTER — TRANSFERRED RECORDS (OUTPATIENT)
Dept: HEALTH INFORMATION MANAGEMENT | Facility: CLINIC | Age: 73
End: 2023-04-10

## 2023-05-03 ENCOUNTER — OFFICE VISIT (OUTPATIENT)
Dept: DERMATOLOGY | Facility: CLINIC | Age: 73
End: 2023-05-03
Attending: PHYSICIAN ASSISTANT
Payer: COMMERCIAL

## 2023-05-03 DIAGNOSIS — L72.0 EIC (EPIDERMAL INCLUSION CYST): ICD-10-CM

## 2023-05-03 DIAGNOSIS — L82.1 SEBORRHEIC KERATOSIS: Primary | ICD-10-CM

## 2023-05-03 PROCEDURE — 99203 OFFICE O/P NEW LOW 30 MIN: CPT | Performed by: PHYSICIAN ASSISTANT

## 2023-05-03 PROCEDURE — 99207 PR NON-BILLABLE SERV PER CHARTING: CPT | Performed by: PHYSICIAN ASSISTANT

## 2023-05-03 NOTE — PROGRESS NOTES
Love Kaiser is an extremely pleasant 73 year old year old female patient here today for bump on left cheek and left posterior ear. She notes present for years. She has had cysts in the past. She notes notes brown spots on back. Patient has no other skin complaints today.  Remainder of the HPI, Meds, PMH, Allergies, FH, and SH was reviewed in chart.    Past Medical History:   Diagnosis Date     Acute alcoholic hepatitis      Acute pancreatitis 08/07/06    aDMIT. dISCHARGED 08/10/06     Cancer of parotid gland (H) 1974     Chronic airway obstruction, not elsewhere classified      COPD (chronic obstructive pulmonary disease) (H) 3/30/2009     Diarrhea      Hyposmolality and/or hyponatremia      Macular degeneration 8/25/2016     Other and unspecified alcohol dependence, continuous drinking behavior      Other chronic nonalcoholic liver disease      Unspecified protein-calorie malnutrition (H)        Past Surgical History:   Procedure Laterality Date     ARTHROPLASTY SHOULDER Right 5/26/2015    Procedure: ARTHROPLASTY SHOULDER;  Surgeon: Ashutosh Andrews DO;  Location: [facility]     CL AFF SURGICAL PATHOLOGY  1974    parotid tumor with malignancy     COLONOSCOPY  09/21/09     COLONOSCOPY N/A 7/6/2016    Procedure: COMBINED COLONOSCOPY, SINGLE OR MULTIPLE BIOPSY/POLYPECTOMY BY BIOPSY;  Surgeon: John Bellamy MD;  Location: [facility]     COLONOSCOPY N/A 7/24/2019    Procedure: COLONOSCOPY;  Surgeon: Kia Jean MD;  Location: [facility]     EXCISE MASS FOOT  1/17/2012    Procedure:EXCISE MASS FOOT; Excision of Mass Right Foot; Surgeon:CARRIE PAGAN; Location:[facility]     FOREIGN BODY REMOVAL  07/08/10    Soft tissue mass w/peripheral metal fragments     HC UGI ENDOSCOPY DIAG W BIOPSY  11/22/06     HYSTERECTOMY, PAP NO LONGER INDICATED       HYSTERECTOMY, FIDE       OPEN REDUCTION INTERNAL FIXATION WRIST Left 2/1/2019    Procedure: Left distal radius open reduction and internal fixation;  Surgeon:  Ashutosh Andrews, DO;  Location:  OR     ZZC REMOVAL OF OVARY(S)       ZZHC COLONOSCOPY W BIOPSY  11/22/06     ZZHC COLONOSCOPY W BIOPSY  10/31/07        Family History   Problem Relation Age of Onset     Arthritis Mother         RA     Cancer Mother         female organs     Cancer Father         colon       Social History     Socioeconomic History     Marital status: Single     Spouse name: Not on file     Number of children: Not on file     Years of education: Not on file     Highest education level: Not on file   Occupational History     Occupation: unemployed   Tobacco Use     Smoking status: Every Day     Packs/day: 1.00     Years: 31.00     Pack years: 31.00     Types: Cigarettes     Smokeless tobacco: Never   Vaping Use     Vaping status: Never Used   Substance and Sexual Activity     Alcohol use: No     Alcohol/week: 23.3 standard drinks of alcohol     Comment: quit drinking     Drug use: No     Sexual activity: Yes     Partners: Male     Birth control/protection: None     Comment: not currently   Other Topics Concern     Parent/sibling w/ CABG, MI or angioplasty before 65F 55M? No   Social History Narrative     Not on file     Social Determinants of Health     Financial Resource Strain: Not on file   Food Insecurity: Not on file   Transportation Needs: Not on file   Physical Activity: Not on file   Stress: Not on file   Social Connections: Not on file   Intimate Partner Violence: Not on file   Housing Stability: Not on file       Outpatient Encounter Medications as of 5/3/2023   Medication Sig Dispense Refill     albuterol (PROAIR HFA/PROVENTIL HFA/VENTOLIN HFA) 108 (90 Base) MCG/ACT inhaler INHALE ONE OR TWO PUFFS BY MOUTH EVERY FOUR HOURS AS NEEDED 8.5 g 0     aspirin 81 MG tablet Take 1 tablet (81 mg) by mouth daily 90 tablet 3     Atorvastatin Calcium (LIPITOR PO) Take 40 mg by mouth daily       beclomethasone HFA (QVAR REDIHALER) 40 MCG/ACT inhaler Inhale 1 puff into the lungs 2 times daily  10.6 g 5     buPROPion (WELLBUTRIN XL) 150 MG 24 hr tablet Take 1 tablet (150 mg) by mouth every morning For a week then increase to the 300 mg tablet. 7 tablet 0     buPROPion (WELLBUTRIN XL) 300 MG 24 hr tablet Take 1 tablet (300 mg) by mouth every morning 90 tablet 1     CARTIA  MG 24 hr capsule Take 180 mg by mouth daily  8     nicotine (NICODERM CQ) 14 MG/24HR 24 hr patch Place 1 patch onto the skin every 24 hours 30 patch 1     NITROGLYCERIN SL Place 0.4 mg under the tongue Reported on 5/17/2017 (Patient not taking: Reported on 7/13/2021)       POTASSIUM 75 MG OR TABS 1 TABLET  DAILY       VITAMIN B-12 500 MCG OR TABS daily       VITAMIN C 500 MG OR TABS ONE TABLET DAILY 90 Tab 3     VITAMIN D-3 SUPER STRENGTH 2000 UNIT OR TABS daily       No facility-administered encounter medications on file as of 5/3/2023.             O:   NAD, WDWN, Alert & Oriented, Mood & Affect wnl, Vitals stable   Here today alone   There were no vitals taken for this visit.   General appearance normal   Vitals stable   Alert, oriented and in no acute distress     White papule on left cheek and left posterior ear   Stuck on papules      Eyes: Conjunctivae/lids:Normal     ENT: Lips: normal    MSK:Normal    Pulm: Breathing Normal    Neuro/Psych: Orientation:Alert and Orientedx3 ; Mood/Affect:normal   A/P:  1. Epidermal inclusion cyst on left cheek and left posterior ear   Made small incision with 18 gauge needle and expressed cyst. Routine wound care. No charge.  2. Seborrheic keratosis  It was a pleasure speaking to Love Kaiser today.  BENIGN LESIONS DISCUSSED WITH PATIENT:  I discussed the specifics of tumor, prognosis, and genetics of benign lesions.  I explained that treatment of these lesions would be purely cosmetic and not medically neccessary.  I discussed with patient different removal options including excision, cautery and /or laser.      Nature and genetics of benign skin lesions dicussed with patient.  Signs and  Symptoms of skin cancer discussed with patient.  ABCDEs of melanoma reviewed with patient.  Patient encouraged to perform monthly skin exams.  UV precautions reviewed with patient.  Risks of non-melanoma skin cancer discussed with patient   Return to clinic in pending

## 2023-05-03 NOTE — PATIENT INSTRUCTIONS
Wound Care Instructions     FOR SUPERFICIAL WOUNDS     [facility], [facility] or    [facility] [phone]          AFTER 24 HOURS YOU SHOULD REMOVE THE BANDAGE AND BEGIN DAILY DRESSING CHANGES AS FOLLOWS:     1) Remove Dressing.     2) Clean and dry the area with tap water using a Q-tip or sterile gauze pad.     3) Apply Vaseline, Aquaphor, Polysporin ointment or Bacitracin ointment over entire wound.  Do NOT use Neosporin ointment.     4) Cover the wound with a band-aid, or a sterile non-stick gauze pad and micropore paper tape      REPEAT THESE INSTRUCTIONS AT LEAST ONCE A DAY UNTIL THE WOUND HAS COMPLETELY HEALED.    It is an old wives tale that a wound heals better when it is exposed to air and allowed to dry out. The wound will heal faster with a better cosmetic result if it is kept moist with ointment and covered with a bandage.    **Do not let the wound dry out.**      Supplies Needed:      *Cotton tipped applicators (Q-tips)    *Polysporin Ointment or Bacitracin Ointment (NOT NEOSPORIN)    *Band-aids or non-stick gauze pads and micropore paper tape.      PATIENT INFORMATION:    During the healing process you will notice a number of changes. All wounds develop a small halo of redness surrounding the wound.  This means healing is occurring. Severe itching with extensive redness usually indicates sensitivity to the ointment or bandage tape used to dress the wound.  You should call our office if this develops.      Swelling  and/or discoloration around your surgical site is common, particularly when performed around the eye.    All wounds normally drain.  The larger the wound the more drainage there will be.  After 7-10 days, you will notice the wound beginning to shrink and new skin will begin to grow.  The wound is healed when you can see skin has formed over the entire area.  A healed wound has a healthy, shiny look to the surface and is red to dark pink in color to normalize.   Wounds may take approximately 4-6 weeks to heal.  Larger wounds may take 6-8 weeks.  After the wound is healed you may discontinue dressing changes.    You may experience a sensation of tightness as your wound heals. This is normal and will gradually subside.    Your healed wound may be sensitive to temperature changes. This sensitivity improves with time, but if you re having a lot of discomfort, try to avoid temperature extremes.    Patients frequently experience itching after their wound appears to have healed because of the continue healing under the skin.  Plain Vaseline will help relieve the itching.        POSSIBLE COMPLICATIONS    BLEEDING:    Leave the bandage in place.  Use tightly rolled up gauze or a cloth to apply direct pressure over the bandage for 30  minutes.  Reapply pressure for an additional 30 minutes if necessary  Use additional gauze and tape to maintain pressure once the bleeding has stopped.

## 2023-05-03 NOTE — LETTER
5/3/2023         RE: Love Kaiser  [address] [facility] [address]        Dear Colleague,    Thank you for referring your patient, Love Kaiser, to the [facility]. Please see a copy of my visit note below.    Love Kaiser is an extremely pleasant 73 year old year old female patient here today for bump on left cheek and left posterior ear. She notes present for years. She has had cysts in the past. She notes notes brown spots on back. Patient has no other skin complaints today.  Remainder of the HPI, Meds, PMH, Allergies, FH, and SH was reviewed in chart.    Past Medical History:   Diagnosis Date     Acute alcoholic hepatitis      Acute pancreatitis 08/07/06    aDMIT. dISCHARGED 08/10/06     Cancer of parotid gland (H) 1974     Chronic airway obstruction, not elsewhere classified      COPD (chronic obstructive pulmonary disease) (H) 3/30/2009     Diarrhea      Hyposmolality and/or hyponatremia      Macular degeneration 8/25/2016     Other and unspecified alcohol dependence, continuous drinking behavior      Other chronic nonalcoholic liver disease      Unspecified protein-calorie malnutrition (H)        Past Surgical History:   Procedure Laterality Date     ARTHROPLASTY SHOULDER Right 5/26/2015    Procedure: ARTHROPLASTY SHOULDER;  Surgeon: Ashutosh Andrews DO;  [facility]     CL AFF SURGICAL PATHOLOGY  1974    parotid tumor with malignancy     COLONOSCOPY  09/21/09     COLONOSCOPY N/A 7/6/2016    Procedure: COMBINED COLONOSCOPY, SINGLE OR MULTIPLE BIOPSY/POLYPECTOMY BY BIOPSY;  Surgeon: John Bellamy MD;  [facility]     COLONOSCOPY N/A 7/24/2019    Procedure: COLONOSCOPY;  Surgeon: Kia Jean MD;  [facility]     EXCISE MASS FOOT  1/17/2012    Procedure:EXCISE MASS FOOT; Excision of Mass Right Foot; Surgeon:CARRIE PAGAN; Location:[facility]     FOREIGN BODY REMOVAL  07/08/10    Soft tissue mass w/peripheral metal fragments      HC UGI ENDOSCOPY DIAG W BIOPSY  11/22/06     HYSTERECTOMY, PAP NO LONGER INDICATED       HYSTERECTOMY, FIDE       OPEN REDUCTION INTERNAL FIXATION WRIST Left 2/1/2019    Procedure: Left distal radius open reduction and internal fixation;  Surgeon: Ashutosh Andrews DO;  Location:  OR     Z REMOVAL OF OVARY(S)       ZZHC COLONOSCOPY W BIOPSY  11/22/06     ZZHC COLONOSCOPY W BIOPSY  10/31/07        Family History   Problem Relation Age of Onset     Arthritis Mother         RA     Cancer Mother         female organs     Cancer Father         colon       Social History     Socioeconomic History     Marital status: Single     Spouse name: Not on file     Number of children: Not on file     Years of education: Not on file     Highest education level: Not on file   Occupational History     Occupation: unemployed   Tobacco Use     Smoking status: Every Day     Packs/day: 1.00     Years: 31.00     Pack years: 31.00     Types: Cigarettes     Smokeless tobacco: Never   Vaping Use     Vaping status: Never Used   Substance and Sexual Activity     Alcohol use: No     Alcohol/week: 23.3 standard drinks of alcohol     Comment: quit drinking     Drug use: No     Sexual activity: Yes     Partners: Male     Birth control/protection: None     Comment: not currently   Other Topics Concern     Parent/sibling w/ CABG, MI or angioplasty before 65F 55M? No   Social History Narrative     Not on file     Social Determinants of Health     Financial Resource Strain: Not on file   Food Insecurity: Not on file   Transportation Needs: Not on file   Physical Activity: Not on file   Stress: Not on file   Social Connections: Not on file   Intimate Partner Violence: Not on file   Housing Stability: Not on file       Outpatient Encounter Medications as of 5/3/2023   Medication Sig Dispense Refill     albuterol (PROAIR HFA/PROVENTIL HFA/VENTOLIN HFA) 108 (90 Base) MCG/ACT inhaler INHALE ONE OR TWO PUFFS BY MOUTH EVERY FOUR HOURS AS NEEDED  8.5 g 0     aspirin 81 MG tablet Take 1 tablet (81 mg) by mouth daily 90 tablet 3     Atorvastatin Calcium (LIPITOR PO) Take 40 mg by mouth daily       beclomethasone HFA (QVAR REDIHALER) 40 MCG/ACT inhaler Inhale 1 puff into the lungs 2 times daily 10.6 g 5     buPROPion (WELLBUTRIN XL) 150 MG 24 hr tablet Take 1 tablet (150 mg) by mouth every morning For a week then increase to the 300 mg tablet. 7 tablet 0     buPROPion (WELLBUTRIN XL) 300 MG 24 hr tablet Take 1 tablet (300 mg) by mouth every morning 90 tablet 1     CARTIA  MG 24 hr capsule Take 180 mg by mouth daily  8     nicotine (NICODERM CQ) 14 MG/24HR 24 hr patch Place 1 patch onto the skin every 24 hours 30 patch 1     NITROGLYCERIN SL Place 0.4 mg under the tongue Reported on 5/17/2017 (Patient not taking: Reported on 7/13/2021)       POTASSIUM 75 MG OR TABS 1 TABLET  DAILY       VITAMIN B-12 500 MCG OR TABS daily       VITAMIN C 500 MG OR TABS ONE TABLET DAILY 90 Tab 3     VITAMIN D-3 SUPER STRENGTH 2000 UNIT OR TABS daily       No facility-administered encounter medications on file as of 5/3/2023.             O:   NAD, WDWN, Alert & Oriented, Mood & Affect wnl, Vitals stable   Here today alone   There were no vitals taken for this visit.   General appearance normal   Vitals stable   Alert, oriented and in no acute distress     White papule on left cheek and left posterior ear   Stuck on papules      Eyes: Conjunctivae/lids:Normal     ENT: Lips: normal    MSK:Normal    Pulm: Breathing Normal    Neuro/Psych: Orientation:Alert and Orientedx3 ; Mood/Affect:normal   A/P:  1. Epidermal inclusion cyst on left cheek and left posterior ear   Made small incision with 18 gauge needle and expressed cyst. Routine wound care. No charge.  2. Seborrheic keratosis  It was a pleasure speaking to Love Kaiser today.  BENIGN LESIONS DISCUSSED WITH PATIENT:  I discussed the specifics of tumor, prognosis, and genetics of benign lesions.  I explained that  treatment of these lesions would be purely cosmetic and not medically neccessary.  I discussed with patient different removal options including excision, cautery and /or laser.      Nature and genetics of benign skin lesions dicussed with patient.  Signs and Symptoms of skin cancer discussed with patient.  ABCDEs of melanoma reviewed with patient.  Patient encouraged to perform monthly skin exams.  UV precautions reviewed with patient.  Risks of non-melanoma skin cancer discussed with patient   Return to clinic in pending       Again, thank you for allowing me to participate in the care of your patient.        Sincerely,        Dixie Yap PA-C

## 2023-05-09 ENCOUNTER — TELEPHONE (OUTPATIENT)
Dept: FAMILY MEDICINE | Facility: OTHER | Age: 73
End: 2023-05-09

## 2023-05-09 NOTE — TELEPHONE ENCOUNTER
Patient sttes awakened this morning with a lump on left side just below jawline.  She states is larger and more tender now than it was early this a.m.  States golf ball size, feels solid, not moveable.  Very tender to touch; states on 0-10 scale would rate it a 10 when touched;  when not touched but is tolerable/significantly less.  Able to swallow fluids; ate a sandwhich at 9:30.  States able to eat/drink but increased discomfort.  No sore throat, no fever, unable to tell me if is red because she is legally blind.  Not warm to touch.  She had had epidermal cysts removed on left cheek and left posterior ear on 5/3/23; no sutures.  Patient aware will need to be seen for assessment.  She states no transportation til after son home from work.  Given urgent care locations for [facility].  Made aware also that [facility] has an urgent care facility in [place].  She states her son is familiar with that one.  She will text him and let [facility]  Aware hours of urgent care end at 7pm.  Pauline FRANK RN

## 2023-05-09 NOTE — TELEPHONE ENCOUNTER
Patient Returning Call    Reason for call:  Patient is requesting a call back from Irma Saez PA. She states she had 2 cysts removed last week but now she has a lump under her ear that is causing her pain to talk and chew. Please call.    Information relayed to patient:  Message placed, await callback     Patient has additional questions:  Yes    What are your questions/concerns:  Would like to speak with provider    Who does the patient want to speak with:  Provider    Is an  needed?:  No      Okay to leave a detailed message?: Yes at Home number on file [phone] (home)

## 2023-05-24 ENCOUNTER — PATIENT OUTREACH (OUTPATIENT)
Dept: CARE COORDINATION | Facility: CLINIC | Age: 73
End: 2023-05-24
Payer: COMMERCIAL

## 2023-07-06 ENCOUNTER — ANCILLARY PROCEDURE (OUTPATIENT)
Dept: MAMMOGRAPHY | Facility: OTHER | Age: 73
End: 2023-07-06
Payer: COMMERCIAL

## 2023-07-06 DIAGNOSIS — Z12.31 VISIT FOR SCREENING MAMMOGRAM: ICD-10-CM

## 2023-07-06 PROCEDURE — 77067 SCR MAMMO BI INCL CAD: CPT | Mod: TC | Performed by: RADIOLOGY

## 2023-07-06 PROCEDURE — 77063 BREAST TOMOSYNTHESIS BI: CPT | Mod: TC | Performed by: RADIOLOGY

## 2023-08-03 ENCOUNTER — ANCILLARY PROCEDURE (OUTPATIENT)
Dept: GENERAL RADIOLOGY | Facility: OTHER | Age: 73
End: 2023-08-03
Attending: ORTHOPAEDIC SURGERY
Payer: COMMERCIAL

## 2023-08-03 ENCOUNTER — OFFICE VISIT (OUTPATIENT)
Dept: ORTHOPEDICS | Facility: OTHER | Age: 73
End: 2023-08-03
Payer: COMMERCIAL

## 2023-08-03 VITALS
BODY MASS INDEX: 24.84 KG/M2 | WEIGHT: 135 LBS | HEART RATE: 88 BPM | SYSTOLIC BLOOD PRESSURE: 123 MMHG | HEIGHT: 62 IN | DIASTOLIC BLOOD PRESSURE: 71 MMHG | RESPIRATION RATE: 18 BRPM

## 2023-08-03 DIAGNOSIS — M50.10 CERVICAL DISC SYNDROME: ICD-10-CM

## 2023-08-03 DIAGNOSIS — Z96.611 STATUS POST RIGHT SHOULDER HEMIARTHROPLASTY: ICD-10-CM

## 2023-08-03 DIAGNOSIS — M25.511 RIGHT SHOULDER PAIN, UNSPECIFIED CHRONICITY: Primary | ICD-10-CM

## 2023-08-03 DIAGNOSIS — M25.511 RIGHT SHOULDER PAIN, UNSPECIFIED CHRONICITY: ICD-10-CM

## 2023-08-03 PROCEDURE — 99203 OFFICE O/P NEW LOW 30 MIN: CPT | Performed by: ORTHOPAEDIC SURGERY

## 2023-08-03 PROCEDURE — 73030 X-RAY EXAM OF SHOULDER: CPT | Mod: TC | Performed by: RADIOLOGY

## 2023-08-03 NOTE — LETTER
8/3/2023         RE: Love Kaiser  [address] [facility] [address]        Dear Colleague,    Thank you for referring your patient, Love Kaiser, to the [facility] [place]. Please see a copy of my visit note below.    Love Kaiser is a 73 year old female who is seen as self referral for right shoulder and arm pain.  She had right shoulder hemiarthroplasty performed on 5/26/2015.  She had originally done fairly well with this.  She had a significant fall in 2018 and more recently.  Her most recent fall was several months ago.  She fell on the right shoulder and hurt the shoulder and neck.  She has sharp, aching pain rated 4 out of 10 that she reports goes from the neck down the shoulder down into the forearm.  It hurts most with heavy use.  She has not used anti-inflammatories.    X-rays of the right shoulder show hemiarthroplasty in good position.  The glenohumeral space is narrowed consistent with arthritic wear on the glenoid.  The coracoacromial space is also narrowed but is unchanged from x-ray from 2017.    Past Medical History:   Diagnosis Date     Acute alcoholic hepatitis      Acute pancreatitis 08/07/06    aDMIT. dISCHARGED 08/10/06     Cancer of parotid gland (H) 1974     Chronic airway obstruction, not elsewhere classified      COPD (chronic obstructive pulmonary disease) (H) 3/30/2009     Diarrhea      Hyposmolality and/or hyponatremia      Macular degeneration 8/25/2016     Other and unspecified alcohol dependence, continuous drinking behavior      Other chronic nonalcoholic liver disease      Unspecified protein-calorie malnutrition (H)        Past Surgical History:   Procedure Laterality Date     ARTHROPLASTY SHOULDER Right 5/26/2015    Procedure: ARTHROPLASTY SHOULDER;  Surgeon: Ashutosh Andrews DO;  Location: [facility]     CL AFF SURGICAL PATHOLOGY  1974    parotid tumor with malignancy     COLONOSCOPY  09/21/09     COLONOSCOPY N/A 7/6/2016     Procedure: COMBINED COLONOSCOPY, SINGLE OR MULTIPLE BIOPSY/POLYPECTOMY BY BIOPSY;  Surgeon: John Bellamy MD;  Location: [facility]     COLONOSCOPY N/A 7/24/2019    Procedure: COLONOSCOPY;  Surgeon: Kia Jean MD;  Location: [facility]     EXCISE MASS FOOT  1/17/2012    Procedure:EXCISE MASS FOOT; Excision of Mass Right Foot; Surgeon:CARRIE PAGAN; Location:[facility]     FOREIGN BODY REMOVAL  07/08/10    Soft tissue mass w/peripheral metal fragments     HC UGI ENDOSCOPY DIAG W BIOPSY  11/22/06     HYSTERECTOMY, PAP NO LONGER INDICATED       HYSTERECTOMY, FIDE       OPEN REDUCTION INTERNAL FIXATION WRIST Left 2/1/2019    Procedure: Left distal radius open reduction and internal fixation;  Surgeon: Ashutosh Andrews DO;  Location: [facility]     ZZC REMOVAL OF OVARY(S)       ZZHC COLONOSCOPY W BIOPSY  11/22/06     ZZHC COLONOSCOPY W BIOPSY  10/31/07       Family History   Problem Relation Age of Onset     Arthritis Mother         RA     Cancer Mother         female organs     Cancer Father         colon       Social History     Socioeconomic History     Marital status: Single     Spouse name: Not on file     Number of children: Not on file     Years of education: Not on file     Highest education level: Not on file   Occupational History     Occupation: unemployed   Tobacco Use     Smoking status: Every Day     Packs/day: 1.00     Years: 31.00     Pack years: 31.00     Types: Cigarettes     Smokeless tobacco: Never   Vaping Use     Vaping Use: Never used   Substance and Sexual Activity     Alcohol use: No     Alcohol/week: 23.3 standard drinks of alcohol     Comment: quit drinking     Drug use: No     Sexual activity: Yes     Partners: Male     Birth control/protection: None     Comment: not currently   Other Topics Concern     Parent/sibling w/ CABG, MI or angioplasty before 65F 55M? No   Social History Narrative     Not on file     Social Determinants of Health     Financial Resource Strain: Not on file    Food Insecurity: Not on file   Transportation Needs: Not on file   Physical Activity: Not on file   Stress: Not on file   Social Connections: Not on file   Intimate Partner Violence: Not on file   Housing Stability: Not on file       Current Outpatient Medications   Medication Sig Dispense Refill     albuterol (PROAIR HFA/PROVENTIL HFA/VENTOLIN HFA) 108 (90 Base) MCG/ACT inhaler INHALE ONE OR TWO PUFFS BY MOUTH EVERY FOUR HOURS AS NEEDED 8.5 g 0     aspirin 81 MG tablet Take 1 tablet (81 mg) by mouth daily 90 tablet 3     Atorvastatin Calcium (LIPITOR PO) Take 40 mg by mouth daily       beclomethasone HFA (QVAR REDIHALER) 40 MCG/ACT inhaler Inhale 1 puff into the lungs 2 times daily 10.6 g 5     buPROPion (WELLBUTRIN XL) 150 MG 24 hr tablet Take 1 tablet (150 mg) by mouth every morning For a week then increase to the 300 mg tablet. 7 tablet 0     buPROPion (WELLBUTRIN XL) 300 MG 24 hr tablet Take 1 tablet (300 mg) by mouth every morning 90 tablet 1     CARTIA  MG 24 hr capsule Take 180 mg by mouth daily  8     nicotine (NICODERM CQ) 14 MG/24HR 24 hr patch Place 1 patch onto the skin every 24 hours 30 patch 1     NITROGLYCERIN SL Place 0.4 mg under the tongue Reported on 5/17/2017       POTASSIUM 75 MG OR TABS 1 TABLET  DAILY       VITAMIN B-12 500 MCG OR TABS daily       VITAMIN C 500 MG OR TABS ONE TABLET DAILY 90 Tab 3     VITAMIN D-3 SUPER STRENGTH 2000 UNIT OR TABS daily         Allergies   Allergen Reactions     Nickel Itching     No Known Drug Allergy        REVIEW OF SYSTEMS:  CONSTITUTIONAL:  NEGATIVE for fever, chills, change in weight, not feeling tired  SKIN:  NEGATIVE for worrisome rashes, no skin lumps, no skin ulcers and no non-healing wounds  EYES:  NEGATIVE for vision changes or irritation.  ENT/MOUTH:  NEGATIVE.  No hearing loss, no hoarseness, no difficulty swallowing.  RESP:  NEGATIVE. No cough or shortness of breath.  CV:  NEGATIVE for chest pain, palpitations or peripheral edema  GI:   "NEGATIVE for nausea, abdominal pain, heartburn, or change in bowel habits  :  Negative. No dysuria, no hematuria  MUSCULOSKELETAL:  See HPI above  NEURO:  NEGATIVE . No headaches, no dizziness,  no numbness  ENDOCRINE:  NEGATIVE for temperature intolerance, skin/hair changes  HEME/ALLERGY/IMMUNE:  NEGATIVE for bleeding problems  PSYCHIATRIC:  NEGATIVE. no anxiety, no depression.     Exam:  Vitals: /71   Pulse 88   Resp 18   Ht 1.575 m (5' 2\")   Wt 61.2 kg (135 lb)   BMI 24.69 kg/m    BMI= Body mass index is 24.69 kg/m .  Constitutional:  healthy, alert and no distress  Neuro: Alert and Oriented x 3, no focal defects.  Psych: Affect normal   Respiratory: Breathing not labored.  Cardiovascular: normal peripheral pulses  Lymph: no adenopathy  Skin: No rashes,worrisome lesions or skin problems  Chest significant tenderness along the right side of the neck in the paraspinal muscles.  She also has tenderness in the supraclavicular area on the right.  She has no significant tenderness of the AC joint or subacromial space.  She does have some pain in the shoulder with rotational movements of the shoulder past 50 degrees.  She also has pain with flexion to 130 degrees.  She does not have significant pain with resisted internal or external rotation of the shoulder.  She does have some pain with resisted abduction.  She has difficulty with pain and weakness in blocking test and empty can test.  She indicates pain down into the forearm but has negative Tinel over the nerves of the forearm.    Assessment:  1. Likely cervical disc syndrome producing pain below the elbow.  2. 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    Plan:  she will pursue evaluation of neck.  May work on neck and shoulder strengthening.    Again, thank you for allowing me to participate in the care of your patient.        Sincerely,        Juan Sewell MD  "

## 2023-08-04 PROBLEM — M50.10 CERVICAL DISC SYNDROME: Status: ACTIVE | Noted: 2023-08-04

## 2023-08-04 NOTE — PROGRESS NOTES
Love Kaiser is a 73 year old female who is seen as self referral for right shoulder and arm pain.  She had right shoulder hemiarthroplasty performed on 5/26/2015.  She had originally done fairly well with this.  She had a significant fall in 2018 and more recently.  Her most recent fall was several months ago.  She fell on the right shoulder and hurt the shoulder and neck.  She has sharp, aching pain rated 4 out of 10 that she reports goes from the neck down the shoulder down into the forearm.  It hurts most with heavy use.  She has not used anti-inflammatories.    X-rays of the right shoulder show hemiarthroplasty in good position.  The glenohumeral space is narrowed consistent with arthritic wear on the glenoid.  The coracoacromial space is also narrowed but is unchanged from x-ray from 2017.    Past Medical History:   Diagnosis Date    Acute alcoholic hepatitis     Acute pancreatitis 08/07/06    aDMIT. dISCHARGED 08/10/06    Cancer of parotid gland (H) 1974    Chronic airway obstruction, not elsewhere classified     COPD (chronic obstructive pulmonary disease) (H) 3/30/2009    Diarrhea     Hyposmolality and/or hyponatremia     Macular degeneration 8/25/2016    Other and unspecified alcohol dependence, continuous drinking behavior     Other chronic nonalcoholic liver disease     Unspecified protein-calorie malnutrition (H)        Past Surgical History:   Procedure Laterality Date    ARTHROPLASTY SHOULDER Right 5/26/2015    Procedure: ARTHROPLASTY SHOULDER;  Surgeon: Ashutosh Andrews DO;  Location:  OR     AFF SURGICAL PATHOLOGY  1974    parotid tumor with malignancy    COLONOSCOPY  09/21/09    COLONOSCOPY N/A 7/6/2016    Procedure: COMBINED COLONOSCOPY, SINGLE OR MULTIPLE BIOPSY/POLYPECTOMY BY BIOPSY;  Surgeon: John Bellamy MD;  Location:  GI    COLONOSCOPY N/A 7/24/2019    Procedure: COLONOSCOPY;  Surgeon: Kia Jean MD;  Location:  GI    EXCISE MASS FOOT  1/17/2012     Procedure:EXCISE MASS FOOT; Excision of Mass Right Foot; Surgeon:CARRIE PAGAN; Location:[facility]    FOREIGN BODY REMOVAL  07/08/10    Soft tissue mass w/peripheral metal fragments    HC UGI ENDOSCOPY DIAG W BIOPSY  11/22/06    HYSTERECTOMY, PAP NO LONGER INDICATED      HYSTERECTOMY, FIDE      OPEN REDUCTION INTERNAL FIXATION WRIST Left 2/1/2019    Procedure: Left distal radius open reduction and internal fixation;  Surgeon: Ashutosh Andrews DO;  Location: [facility]    ZZC REMOVAL OF OVARY(S)      ZZHC COLONOSCOPY W BIOPSY  11/22/06    ZZHC COLONOSCOPY W BIOPSY  10/31/07       Family History   Problem Relation Age of Onset    Arthritis Mother         RA    Cancer Mother         female organs    Cancer Father         colon       Social History     Socioeconomic History    Marital status: Single     Spouse name: Not on file    Number of children: Not on file    Years of education: Not on file    Highest education level: Not on file   Occupational History    Occupation: unemployed   Tobacco Use    Smoking status: Every Day     Packs/day: 1.00     Years: 31.00     Pack years: 31.00     Types: Cigarettes    Smokeless tobacco: Never   Vaping Use    Vaping Use: Never used   Substance and Sexual Activity    Alcohol use: No     Alcohol/week: 23.3 standard drinks of alcohol     Comment: quit drinking    Drug use: No    Sexual activity: Yes     Partners: Male     Birth control/protection: None     Comment: not currently   Other Topics Concern    Parent/sibling w/ CABG, MI or angioplasty before 65F 55M? No   Social History Narrative    Not on file     Social Determinants of Health     Financial Resource Strain: Not on file   Food Insecurity: Not on file   Transportation Needs: Not on file   Physical Activity: Not on file   Stress: Not on file   Social Connections: Not on file   Intimate Partner Violence: Not on file   Housing Stability: Not on file       Current Outpatient Medications   Medication Sig Dispense Refill     albuterol (PROAIR HFA/PROVENTIL HFA/VENTOLIN HFA) 108 (90 Base) MCG/ACT inhaler INHALE ONE OR TWO PUFFS BY MOUTH EVERY FOUR HOURS AS NEEDED 8.5 g 0    aspirin 81 MG tablet Take 1 tablet (81 mg) by mouth daily 90 tablet 3    Atorvastatin Calcium (LIPITOR PO) Take 40 mg by mouth daily      beclomethasone HFA (QVAR REDIHALER) 40 MCG/ACT inhaler Inhale 1 puff into the lungs 2 times daily 10.6 g 5    buPROPion (WELLBUTRIN XL) 150 MG 24 hr tablet Take 1 tablet (150 mg) by mouth every morning For a week then increase to the 300 mg tablet. 7 tablet 0    buPROPion (WELLBUTRIN XL) 300 MG 24 hr tablet Take 1 tablet (300 mg) by mouth every morning 90 tablet 1    CARTIA  MG 24 hr capsule Take 180 mg by mouth daily  8    nicotine (NICODERM CQ) 14 MG/24HR 24 hr patch Place 1 patch onto the skin every 24 hours 30 patch 1    NITROGLYCERIN SL Place 0.4 mg under the tongue Reported on 5/17/2017      POTASSIUM 75 MG OR TABS 1 TABLET  DAILY      VITAMIN B-12 500 MCG OR TABS daily      VITAMIN C 500 MG OR TABS ONE TABLET DAILY 90 Tab 3    VITAMIN D-3 SUPER STRENGTH 2000 UNIT OR TABS daily         Allergies   Allergen Reactions    Nickel Itching    No Known Drug Allergy        REVIEW OF SYSTEMS:  CONSTITUTIONAL:  NEGATIVE for fever, chills, change in weight, not feeling tired  SKIN:  NEGATIVE for worrisome rashes, no skin lumps, no skin ulcers and no non-healing wounds  EYES:  NEGATIVE for vision changes or irritation.  ENT/MOUTH:  NEGATIVE.  No hearing loss, no hoarseness, no difficulty swallowing.  RESP:  NEGATIVE. No cough or shortness of breath.  CV:  NEGATIVE for chest pain, palpitations or peripheral edema  GI:  NEGATIVE for nausea, abdominal pain, heartburn, or change in bowel habits  :  Negative. No dysuria, no hematuria  MUSCULOSKELETAL:  See HPI above  NEURO:  NEGATIVE . No headaches, no dizziness,  no numbness  ENDOCRINE:  NEGATIVE for temperature intolerance, skin/hair changes  HEME/ALLERGY/IMMUNE:  NEGATIVE for  "bleeding problems  PSYCHIATRIC:  NEGATIVE. no anxiety, no depression.     Exam:  Vitals: /71   Pulse 88   Resp 18   Ht 1.575 m (5' 2\")   Wt 61.2 kg (135 lb)   BMI 24.69 kg/m    BMI= Body mass index is 24.69 kg/m .  Constitutional:  healthy, alert and no distress  Neuro: Alert and Oriented x 3, no focal defects.  Psych: Affect normal   Respiratory: Breathing not labored.  Cardiovascular: normal peripheral pulses  Lymph: no adenopathy  Skin: No rashes,worrisome lesions or skin problems  Chest significant tenderness along the right side of the neck in the paraspinal muscles.  She also has tenderness in the supraclavicular area on the right.  She has no significant tenderness of the AC joint or subacromial space.  She does have some pain in the shoulder with rotational movements of the shoulder past 50 degrees.  She also has pain with flexion to 130 degrees.  She does not have significant pain with resisted internal or external rotation of the shoulder.  She does have some pain with resisted abduction.  She has difficulty with pain and weakness in blocking test and empty can test.  She indicates pain down into the forearm but has negative Tinel over the nerves of the forearm.    Assessment:  1. Likely cervical disc syndrome producing pain below the elbow.  2. 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    Plan:  she will pursue evaluation of neck.  May work on neck and shoulder strengthening.    "

## 2023-08-21 DIAGNOSIS — J41.0 SIMPLE CHRONIC BRONCHITIS (H): ICD-10-CM

## 2023-08-21 DIAGNOSIS — J44.1 COPD EXACERBATION (H): ICD-10-CM

## 2023-08-21 RX ORDER — ALBUTEROL SULFATE 90 UG/1
AEROSOL, METERED RESPIRATORY (INHALATION)
Qty: 8.5 G | Refills: 0 | Status: SHIPPED | OUTPATIENT
Start: 2023-08-21 | End: 2023-10-04

## 2023-10-03 DIAGNOSIS — J44.1 COPD EXACERBATION (H): ICD-10-CM

## 2023-10-03 DIAGNOSIS — J41.0 SIMPLE CHRONIC BRONCHITIS (H): ICD-10-CM

## 2023-10-04 RX ORDER — ALBUTEROL SULFATE 90 UG/1
AEROSOL, METERED RESPIRATORY (INHALATION)
Qty: 8.5 G | Refills: 0 | Status: SHIPPED | OUTPATIENT
Start: 2023-10-04 | End: 2023-12-12

## 2023-10-30 ENCOUNTER — PATIENT OUTREACH (OUTPATIENT)
Dept: CARE COORDINATION | Facility: CLINIC | Age: 73
End: 2023-10-30
Payer: COMMERCIAL

## 2023-11-20 ENCOUNTER — PATIENT OUTREACH (OUTPATIENT)
Dept: GASTROENTEROLOGY | Facility: CLINIC | Age: 73
End: 2023-11-20
Payer: COMMERCIAL

## 2023-12-12 ENCOUNTER — OFFICE VISIT (OUTPATIENT)
Dept: FAMILY MEDICINE | Facility: OTHER | Age: 73
End: 2023-12-12
Payer: COMMERCIAL

## 2023-12-12 VITALS
HEART RATE: 79 BPM | SYSTOLIC BLOOD PRESSURE: 128 MMHG | DIASTOLIC BLOOD PRESSURE: 54 MMHG | OXYGEN SATURATION: 97 % | RESPIRATION RATE: 16 BRPM | WEIGHT: 138 LBS | BODY MASS INDEX: 24.45 KG/M2 | HEIGHT: 63 IN | TEMPERATURE: 98 F

## 2023-12-12 DIAGNOSIS — Z87.891 PERSONAL HISTORY OF TOBACCO USE: ICD-10-CM

## 2023-12-12 DIAGNOSIS — I20.1 PRINZMETAL ANGINA (H): ICD-10-CM

## 2023-12-12 DIAGNOSIS — I10 ESSENTIAL HYPERTENSION, BENIGN: ICD-10-CM

## 2023-12-12 DIAGNOSIS — Z00.00 ENCOUNTER FOR MEDICARE ANNUAL WELLNESS EXAM: Primary | ICD-10-CM

## 2023-12-12 DIAGNOSIS — E78.5 HYPERLIPIDEMIA LDL GOAL <160: ICD-10-CM

## 2023-12-12 DIAGNOSIS — R73.09 ELEVATED GLUCOSE: ICD-10-CM

## 2023-12-12 DIAGNOSIS — R59.1 LYMPHADENOPATHY: ICD-10-CM

## 2023-12-12 DIAGNOSIS — J41.0 SIMPLE CHRONIC BRONCHITIS (H): ICD-10-CM

## 2023-12-12 LAB
ALBUMIN SERPL BCG-MCNC: 4.7 G/DL (ref 3.5–5.2)
ALP SERPL-CCNC: 64 U/L (ref 40–150)
ALT SERPL W P-5'-P-CCNC: 12 U/L (ref 0–50)
ANION GAP SERPL CALCULATED.3IONS-SCNC: 12 MMOL/L (ref 7–15)
AST SERPL W P-5'-P-CCNC: 17 U/L (ref 0–45)
BILIRUB SERPL-MCNC: 0.5 MG/DL
BUN SERPL-MCNC: 5.8 MG/DL (ref 8–23)
CALCIUM SERPL-MCNC: 9.6 MG/DL (ref 8.8–10.2)
CHLORIDE SERPL-SCNC: 101 MMOL/L (ref 98–107)
CHOLEST SERPL-MCNC: 185 MG/DL
CREAT SERPL-MCNC: 0.8 MG/DL (ref 0.51–0.95)
CREAT UR-MCNC: 69.5 MG/DL
DEPRECATED HCO3 PLAS-SCNC: 26 MMOL/L (ref 22–29)
EGFRCR SERPLBLD CKD-EPI 2021: 78 ML/MIN/1.73M2
FASTING STATUS PATIENT QL REPORTED: YES
GLUCOSE SERPL-MCNC: 104 MG/DL (ref 70–99)
HBA1C MFR BLD: 5.6 % (ref 0–5.6)
HDLC SERPL-MCNC: 91 MG/DL
LDLC SERPL CALC-MCNC: 75 MG/DL
MICROALBUMIN UR-MCNC: 13.8 MG/L
MICROALBUMIN/CREAT UR: 19.86 MG/G CR (ref 0–25)
NONHDLC SERPL-MCNC: 94 MG/DL
POTASSIUM SERPL-SCNC: 4 MMOL/L (ref 3.4–5.3)
PROT SERPL-MCNC: 6.8 G/DL (ref 6.4–8.3)
SODIUM SERPL-SCNC: 139 MMOL/L (ref 135–145)
TRIGL SERPL-MCNC: 93 MG/DL

## 2023-12-12 PROCEDURE — G0296 VISIT TO DETERM LDCT ELIG: HCPCS | Performed by: PHYSICIAN ASSISTANT

## 2023-12-12 PROCEDURE — 83036 HEMOGLOBIN GLYCOSYLATED A1C: CPT | Performed by: PHYSICIAN ASSISTANT

## 2023-12-12 PROCEDURE — 99213 OFFICE O/P EST LOW 20 MIN: CPT | Mod: 25 | Performed by: PHYSICIAN ASSISTANT

## 2023-12-12 PROCEDURE — 80053 COMPREHEN METABOLIC PANEL: CPT | Performed by: PHYSICIAN ASSISTANT

## 2023-12-12 PROCEDURE — 82043 UR ALBUMIN QUANTITATIVE: CPT | Performed by: PHYSICIAN ASSISTANT

## 2023-12-12 PROCEDURE — G0439 PPPS, SUBSEQ VISIT: HCPCS | Performed by: PHYSICIAN ASSISTANT

## 2023-12-12 PROCEDURE — 36415 COLL VENOUS BLD VENIPUNCTURE: CPT | Performed by: PHYSICIAN ASSISTANT

## 2023-12-12 PROCEDURE — 80061 LIPID PANEL: CPT | Performed by: PHYSICIAN ASSISTANT

## 2023-12-12 PROCEDURE — 82570 ASSAY OF URINE CREATININE: CPT | Performed by: PHYSICIAN ASSISTANT

## 2023-12-12 RX ORDER — BECLOMETHASONE DIPROPIONATE HFA 40 UG/1
1 AEROSOL, METERED RESPIRATORY (INHALATION) 2 TIMES DAILY
Qty: 10.6 G | Refills: 5 | Status: SHIPPED | OUTPATIENT
Start: 2023-12-12

## 2023-12-12 RX ORDER — RESPIRATORY SYNCYTIAL VIRUS VACCINE 120MCG/0.5
0.5 KIT INTRAMUSCULAR ONCE
Qty: 1 EACH | Refills: 0 | Status: CANCELLED | OUTPATIENT
Start: 2023-12-12 | End: 2023-12-12

## 2023-12-12 RX ORDER — ALBUTEROL SULFATE 90 UG/1
AEROSOL, METERED RESPIRATORY (INHALATION)
Qty: 18 G | Refills: 3 | Status: SHIPPED | OUTPATIENT
Start: 2023-12-12

## 2023-12-12 RX ORDER — CEPHALEXIN 500 MG/1
500 CAPSULE ORAL 2 TIMES DAILY
Qty: 14 CAPSULE | Refills: 0 | Status: SHIPPED | OUTPATIENT
Start: 2023-12-12 | End: 2023-12-19

## 2023-12-12 ASSESSMENT — ENCOUNTER SYMPTOMS
COUGH: 0
DIZZINESS: 0
FEVER: 0
HEADACHES: 0
WEAKNESS: 0
PALPITATIONS: 0
CHILLS: 0
SORE THROAT: 0
ARTHRALGIAS: 0
DYSURIA: 0
ABDOMINAL PAIN: 0
SHORTNESS OF BREATH: 0
PARESTHESIAS: 0
BREAST MASS: 0
DIARRHEA: 0
NAUSEA: 0
EYE PAIN: 0
MYALGIAS: 0
HEMATURIA: 0
JOINT SWELLING: 0
NERVOUS/ANXIOUS: 0
HEMATOCHEZIA: 0
HEARTBURN: 0
CONSTIPATION: 1
FREQUENCY: 0

## 2023-12-12 ASSESSMENT — ACTIVITIES OF DAILY LIVING (ADL): CURRENT_FUNCTION: SHOPPING REQUIRES ASSISTANCE

## 2023-12-12 ASSESSMENT — PAIN SCALES - GENERAL: PAINLEVEL: MILD PAIN (2)

## 2023-12-12 NOTE — PROGRESS NOTES
The patient reports that she has difficulty with activities of daily living. I have asked that the patient make a follow up appointment in 52 weeks where this issue will be further evaluated and addressed.  The patient was provided with written information regarding signs of hearing loss.

## 2023-12-12 NOTE — PATIENT INSTRUCTIONS
Patient Education   Personalized Prevention Plan  You are due for the preventive services outlined below.  Your care team is available to assist you in scheduling these services.  If you have already completed any of these items, please share that information with your care team to update in your medical record.  Health Maintenance Due   Topic Date Due     RSV VACCINE (Pregnancy & 60+) (1 - 1-dose 60+ series) Never done     Flu Vaccine (1) Never done     COVID-19 Vaccine (3 - 2023-24 season) 09/01/2023     Cholesterol Lab  11/29/2023     LUNG CANCER SCREENING  12/05/2023     Activities of Daily Living    Your Health Risk Assessment indicates you have difficulties with activities of daily living such as housework, bathing, preparing meals, taking medication, etc. Please make a follow up appointment for us to address this issue in more detail.  Hearing Loss: Care Instructions  Overview     Hearing loss is a sudden or slow decrease in how well you hear. It can range from slight to profound. Permanent hearing loss can occur with aging. It also can happen when you are exposed long-term to loud noise. Examples include listening to loud music, riding motorcycles, or being around other loud machines.  Hearing loss can affect your work and home life. It can make you feel lonely or depressed. You may feel that you have lost your independence. But hearing aids and other devices can help you hear better and feel connected to others.  Follow-up care is a key part of your treatment and safety. Be sure to make and go to all appointments, and call your doctor if you are having problems. It's also a good idea to know your test results and keep a list of the medicines you take.  How can you care for yourself at home?  Avoid loud noises whenever possible. This helps keep your hearing from getting worse.  Always wear hearing protection around loud noises.  Wear a hearing aid as directed.  A professional can help you pick a hearing aid  "that will work best for you.  You can also get hearing aids over the counter for mild to moderate hearing loss.  Have hearing tests as your doctor suggests. They can show whether your hearing has changed. Your hearing aid may need to be adjusted.  Use other devices as needed. These may include:  Telephone amplifiers and hearing aids that can connect to a television, stereo, radio, or microphone.  Devices that use lights or vibrations. These alert you to the doorbell, a ringing telephone, or a baby monitor.  Television closed-captioning. This shows the words at the bottom of the screen. Most new TVs can do this.  TTY (text telephone). This lets you type messages back and forth on the telephone instead of talking or listening. These devices are also called TDD. When messages are typed on the keyboard, they are sent over the phone line to a receiving TTY. The message is shown on a monitor.  Use text messaging, social media, and email if it is hard for you to communicate by telephone.  Try to learn a listening technique called speechreading. It is not lipreading. You pay attention to people's gestures, expressions, posture, and tone of voice. These clues can help you understand what a person is saying. Face the person you are talking to, and have them face you. Make sure the lighting is good. You need to see the other person's face clearly.  Think about counseling if you need help to adjust to your hearing loss.  When should you call for help?  Watch closely for changes in your health, and be sure to contact your doctor if:    You think your hearing is getting worse.     You have new symptoms, such as dizziness or nausea.   Where can you learn more?  Go to https://www.[employer]net/patiented  Enter R798 in the search box to learn more about \"Hearing Loss: Care Instructions.\"  Current as of: February 28, 2023               Content Version: 13.8    9303-8544 [email or web address]   Care instructions adapted under " license by your healthcare professional. If you have questions about a medical condition or this instruction, always ask your healthcare professional. [employer] disclaims any warranty or liability for your use of this information.         Lung Cancer Screening   Frequently Asked Questions  If you are at high-risk for lung cancer, getting screened with low-dose computed tomography (LDCT) every year can help save your life. This handout offers answers to some of the most common questions about lung cancer screening. If you have other questions, please call [phone] ([phone]).     What is it?  Lung cancer screening uses special X-ray technology to create an image of your lung tissue. The exam is quick and easy and takes less than 10 seconds. We don t give you any medicine or use any needles. You can eat before and after the exam. You don t need to change your clothes as long as the clothing on your chest doesn t contain metal. But, you do need to be able to hold your breath for at least 6 seconds during the exam.    What is the goal of lung cancer screening?  The goal of lung cancer screening is to save lives. Many times, lung cancer is not found until a person starts having physical symptoms. Lung cancer screening can help detect lung cancer in the earliest stages when it may be easier to treat.    Who should be screened for lung cancer?  We suggest lung cancer screening for anyone who is at high-risk for lung cancer. You are in the high-risk group if you:      are between the ages of 55 and 79, and    have smoked at least 1 pack of cigarettes a day for 20 or more years, and    still smoke or have quit within the past 15 years.    However, if you have a new cough or shortness of breath, you should talk to your doctor before being screened.    Why does it matter if I have symptoms?  Certain symptoms can be a sign that you have a condition in your lungs that should be checked and treated  by your doctor. These symptoms include fever, chest pain, a new or changing cough, shortness of breath that you have never felt before, coughing up blood or unexplained weight loss. Having any of these symptoms can greatly affect the results of lung cancer screening.       Should all smokers get an LDCT lung cancer screening exam?  It depends. Lung cancer screening is for a very specific group of men and women who have a history of heavy smoking over a long period of time (see  Who should be screened for lung cancer  above).  I am in the high-risk group, but have been diagnosed with cancer in the past. Is LDCT lung cancer screening right for me?  In some cases, you should not have LDCT lung screening, such as when your doctor is already following your cancer with CT scan studies. Your doctor will help you decide if LDCT lung screening is right for you.  Do I need to have a screening exam every year?  Yes. If you are in the high-risk group described earlier, you should get an LDCT lung cancer screening exam every year until you are 79, or are no longer willing or able to undergo screening and possible procedures to diagnose and treat lung cancer.  How effective is LDCT at preventing death from lung cancer?  Studies have shown that LDCT lung cancer screening can lower the risk of death from lung cancer by 20 percent in people who are at high-risk.  What are the risks?  There are some risks and limitations of LDCT lung cancer screening. We want to make sure you understand the risks and benefits, so please let us know if you have any questions. Your doctor may want to talk with you more about these risks.    Radiation exposure: As with any exam that uses radiation, there is a very small increased risk of cancer. The amount of radiation in LDCT is small--about the same amount a person would get from a mammogram. Your doctor orders the exam when he or she feels the potential benefits outweigh the risks.    False  negatives: No test is perfect, including LDCT. It is possible that you may have a medical condition, including lung cancer, that is not found during your exam. This is called a false negative result.    False positives and more testing: LDCT very often finds something in the lung that could be cancer, but in fact is not. This is called a false positive result. False positive tests often cause anxiety. To make sure these findings are not cancer, you may need to have more tests. These tests will be done only if you give us permission. Sometimes patients need a treatment that can have side effects, such as a biopsy. For more information on false positives, see  What can I expect from the results?     Findings not related to lung cancer: Your LDCT exam also takes pictures of areas of your body next to your lungs. In a very small number of cases, the CT scan will show an abnormal finding in one of these areas, such as your kidneys, adrenal glands, liver or thyroid. This finding may not be serious, but you may need more tests. Your doctor can help you decide what other tests you may need, if any.  What can I expect from the results?  About 1 out of 4 LDCT exams will find something that may need more tests. Most of the time, these findings are lung nodules. Lung nodules are very small collections of tissue in the lung. These nodules are very common, and the vast majority--more than 97 percent--are not cancer (benign). Most are normal lymph nodes or small areas of scarring from past infections.  But, if a small lung nodule is found to be cancer, the cancer can be cured more than 90 percent of the time. To know if the nodule is cancer, we may need to get more images before your next yearly screening exam. If the nodule has suspicious features (for example, it is large, has an odd shape or grows over time), we will refer you to a specialist for further testing.  Will my doctor also get the results?  Yes. Your doctor will get  a copy of your results.  Is it okay to keep smoking now that there s a cancer screening exam?  No. Tobacco is one of the strongest cancer-causing agents. It causes not only lung cancer, but other cancers and cardiovascular (heart) diseases as well. The damage caused by smoking builds over time. This means that the longer you smoke, the higher your risk of disease. While it is never too late to quit, the sooner you quit, the better.  Where can I find help to quit smoking?  The best way to prevent lung cancer is to stop smoking. If you have already quit smoking, congratulations and keep it up! For help on quitting smoking, please call [employer] at 5-189-QUITNOW (1-752.467.4600) or the [employer] at 1-183.783.8178 to find local resources near you.  One-on-one health coaching:  If you d prefer to work individually with a health care provider on tobacco cessation, we offer:      Medication Therapy Management:  Our specially trained pharmacists work closely with you and your doctor to help you quit smoking.  Call [phone] or [phone] (toll free).

## 2023-12-12 NOTE — RESULT ENCOUNTER NOTE
Called patient and notified them of the message below. Patient also has no further questions at this time.        Tanya Nicole MA

## 2023-12-12 NOTE — PROGRESS NOTES
"SUBJECTIVE:   Nelia is a 73 year old, presenting for the following:  Physical        12/12/2023     7:02 AM   Additional Questions   Roomed by IAM   Accompanied by NA         12/12/2023     7:02 AM   Patient Reported Additional Medications   Patient reports taking the following new medications None       Are you in the first 12 months of your Medicare coverage?  No    Healthy Habits:     In general, how would you rate your overall health?  Good    Frequency of exercise:  2-3 days/week    Duration of exercise:  15-30 minutes    Do you usually eat at least 4 servings of fruit and vegetables a day, include whole grains    & fiber and avoid regularly eating high fat or \"junk\" foods?  Yes    Taking medications regularly:  Yes    Medication side effects:  None    Ability to successfully perform activities of daily living:  Shopping requires assistance    Home Safety:  No safety concerns identified    Hearing Impairment:  Difficulty following a conversation in a noisy restaurant or crowded room, feel that people are mumbling or not speaking clearly, difficulty following dialogue in the theater, need to ask people to speak up or repeat themselves, find that men's voices are easier to understand than woman's, difficulty understanding soft or whispered speech and difficulty understanding speech on the telephone    In the past 6 months, have you been bothered by leaking of urine?  No    In general, how would you rate your overall mental or emotional health?  Good    Additional concerns today:  Yes          Have you ever done Advance Care Planning? (For example, a Health Directive, POLST, or a discussion with a medical provider or your loved ones about your wishes): Yes, advance care planning is on file.       Fall risk  Fallen 2 or more times in the past year?: No  Any fall with injury in the past year?: No    Cognitive Screening   1) Repeat 3 items (Leader, Season, Table)    2) Clock draw:  Pt was unable to see well enough to " draw the clock  3) 3 item recall: Recalls 3 objects  Results: 3 items recalled: COGNITIVE IMPAIRMENT LESS LIKELY    - Visual impairment - legally blind - her numbers were in order the [place] and numbers just did not match up.     Mini-CogTM Copyright S Kellee. Licensed by the author for use in [facility]; reprinted with permission (lance@[email or web address]). All rights reserved.          Reviewed and updated as needed this visit by clinical staff   Tobacco  Allergies  Meds  Problems  Med Hx  Surg Hx  Fam Hx          Reviewed and updated as needed this visit by Provider   Tobacco  Allergies  Meds  Problems  Med Hx  Surg Hx  Fam Hx         Social History     Tobacco Use     Smoking status: Every Day     Packs/day: 1.00     Years: 31.00     Additional pack years: 0.00     Total pack years: 31.00     Types: Cigarettes     Passive exposure: Current     Smokeless tobacco: Never   Substance Use Topics     Alcohol use: No     Alcohol/week: 23.3 standard drinks of alcohol     Comment: quit drinking             12/12/2023     7:03 AM   Alcohol Use   Prescreen: >3 drinks/day or >7 drinks/week? No     Do you have a current opioid prescription? No  Do you use any other controlled substances or medications that are not prescribed by a provider? None          Current providers sharing in care for this patient include:   Patient Care Team:  Irma Saez PA-C as PCP - General (Family Medicine)  Irma Saez PA-C as Assigned PCP  Gela aPck AuD as Audiologist (Audiology)  Irma Saez PA-C as Physician Assistant (Family Medicine)  Dixie Akhtar PA-C as Physician Assistant (Dermatology)  Juan Sewell MD as Assigned Musculoskeletal Provider    The following health maintenance items are reviewed in Epic and correct as of today:  Health Maintenance   Topic Date Due     RSV VACCINE (Pregnancy & 60+) (1 - 1-dose 60+ series) Never done     INFLUENZA VACCINE (1) Never done     COVID-19 Vaccine (3  - 2023-24 season) 09/01/2023     LIPID  11/29/2023     LUNG CANCER SCREENING  12/05/2023     COLORECTAL CANCER SCREENING  07/24/2024     NICOTINE/TOBACCO CESSATION COUNSELING Q 1 YR  12/12/2024     MEDICARE ANNUAL WELLNESS VISIT  12/12/2024     ANNUAL REVIEW OF HM ORDERS  12/12/2024     FALL RISK ASSESSMENT  12/12/2024     MAMMO SCREENING  07/06/2025     ADVANCE CARE PLANNING  12/12/2028     DTAP/TDAP/TD IMMUNIZATION (3 - Td or Tdap) 09/03/2031     DEXA  06/06/2034     SPIROMETRY  Completed     HEPATITIS C SCREENING  Completed     COPD ACTION PLAN  Completed     PHQ-2 (once per calendar year)  Completed     Pneumococcal Vaccine: 65+ Years  Completed     ZOSTER IMMUNIZATION  Completed     IPV IMMUNIZATION  Aged Out     HPV IMMUNIZATION  Aged Out     MENINGITIS IMMUNIZATION  Aged Out     RSV MONOCLONAL ANTIBODY  Aged Out     BP Readings from Last 3 Encounters:   12/12/23 128/54   08/03/23 123/71   11/29/22 138/72    Wt Readings from Last 3 Encounters:   12/12/23 62.6 kg (138 lb)   08/03/23 61.2 kg (135 lb)   11/29/22 56 kg (123 lb 8 oz)                  Patient Active Problem List   Diagnosis     Esophageal reflux     Acute gastritis     Slow transit constipation     Osteoporosis     COPD (chronic obstructive pulmonary disease) (H)     Pneumonia     Atopic rhinitis     Hyperlipidemia LDL goal <160     Health Care Home     Hx of colonic polyp     Adenomatous polyp     Advanced directives, counseling/discussion     Status post right shoulder hemiarthroplasty     Essential hypertension, benign     Prinzmetal angina (H24)     Hyperlipidemia LDL goal <70     Intermediate stage nonexudative age-related macular degeneration of both eyes     Closed Colles' fracture of left radius, initial encounter     Legally blind     Cervical disc syndrome     Past Surgical History:   Procedure Laterality Date     ARTHROPLASTY SHOULDER Right 5/26/2015    Procedure: ARTHROPLASTY SHOULDER;  Surgeon: Ashutosh Andrews DO;  Location:  [facility] OR     CL AFF SURGICAL PATHOLOGY  1974    parotid tumor with malignancy     COLONOSCOPY  09/21/09     COLONOSCOPY N/A 7/6/2016    Procedure: COMBINED COLONOSCOPY, SINGLE OR MULTIPLE BIOPSY/POLYPECTOMY BY BIOPSY;  Surgeon: John Bellamy MD;  Location: [facility] GI     COLONOSCOPY N/A 7/24/2019    Procedure: COLONOSCOPY;  Surgeon: Kia Jean MD;  Location: [facility] GI     EXCISE MASS FOOT  1/17/2012    Procedure:EXCISE MASS FOOT; Excision of Mass Right Foot; Surgeon:CARRIE PAGAN; Location:[facility] OR     FOREIGN BODY REMOVAL  07/08/10    Soft tissue mass w/peripheral metal fragments     [facility] UGI ENDOSCOPY DIAG W BIOPSY  11/22/06     HYSTERECTOMY, PAP NO LONGER INDICATED       HYSTERECTOMY, FIDE       OPEN REDUCTION INTERNAL FIXATION WRIST Left 2/1/2019    Procedure: Left distal radius open reduction and internal fixation;  Surgeon: Ashutosh Andrews DO;  Location: [facility] OR     ZZC REMOVAL OF OVARY(S)       ZZHC COLONOSCOPY W BIOPSY  11/22/06     ZZHC COLONOSCOPY W BIOPSY  10/31/07       Social History     Tobacco Use     Smoking status: Every Day     Packs/day: 1.00     Years: 31.00     Additional pack years: 0.00     Total pack years: 31.00     Types: Cigarettes     Passive exposure: Current     Smokeless tobacco: Never   Substance Use Topics     Alcohol use: No     Alcohol/week: 23.3 standard drinks of alcohol     Comment: quit drinking     Family History   Problem Relation Age of Onset     Arthritis Mother         RA     Cancer Mother         female organs     Cancer Father         colon         Current Outpatient Medications   Medication Sig Dispense Refill     albuterol (PROAIR HFA/PROVENTIL HFA/VENTOLIN HFA) 108 (90 Base) MCG/ACT inhaler INHALE ONE OR TWO PUFFS BY MOUTH EVERY FOUR HOURS AS NEEDED 18 g 3     aspirin 81 MG tablet Take 1 tablet (81 mg) by mouth daily 90 tablet 3     Atorvastatin Calcium (LIPITOR PO) Take 40 mg by mouth daily       beclomethasone HFA (QVAR REDIHALER) 40 MCG/ACT inhaler  "Inhale 1 puff into the lungs 2 times daily 10.6 g 5     CARTIA  MG 24 hr capsule Take 180 mg by mouth daily  8     cephALEXin (KEFLEX) 500 MG capsule Take 1 capsule (500 mg) by mouth 2 times daily for 7 days 14 capsule 0     nicotine (NICODERM CQ) 14 MG/24HR 24 hr patch Place 1 patch onto the skin every 24 hours 30 patch 1     NITROGLYCERIN SL Place 0.4 mg under the tongue Reported on 5/17/2017       POTASSIUM 75 MG OR TABS 1 TABLET  DAILY       VITAMIN B-12 500 MCG OR TABS daily       VITAMIN C 500 MG OR TABS ONE TABLET DAILY 90 Tab 3     VITAMIN D-3 SUPER STRENGTH 2000 UNIT OR TABS daily       Allergies   Allergen Reactions     No Known Drug Allergy      Nickel Itching, Rash and Swelling     Mammogram Screening: Mammogram Screening: Recommended mammography every 1-2 years with patient discussion and risk factor consideration        Review of Systems   Constitutional:  Negative for chills and fever.   HENT:  Negative for congestion, ear pain, hearing loss and sore throat.    Eyes:  Negative for pain and visual disturbance.   Respiratory:  Negative for cough and shortness of breath.    Cardiovascular:  Negative for chest pain, palpitations and peripheral edema.   Gastrointestinal:  Positive for constipation. Negative for abdominal pain, diarrhea, heartburn, hematochezia and nausea.   Breasts:  Negative for tenderness, breast mass and discharge.   Genitourinary:  Negative for dysuria, frequency, genital sores, hematuria, pelvic pain, urgency, vaginal bleeding and vaginal discharge.   Musculoskeletal:  Negative for arthralgias, joint swelling and myalgias.   Skin:  Negative for rash.   Neurological:  Negative for dizziness, weakness, headaches and paresthesias.   Psychiatric/Behavioral:  Negative for mood changes. The patient is not nervous/anxious.          OBJECTIVE:   /54   Pulse 79   Temp 98  F (36.7  C) (Temporal)   Resp 16   Ht 1.6 m (5' 3\")   Wt 62.6 kg (138 lb)   SpO2 97%   BMI 24.45 kg/m   " "Estimated body mass index is 24.45 kg/m  as calculated from the following:    Height as of this encounter: 1.6 m (5' 3\").    Weight as of this encounter: 62.6 kg (138 lb).  Physical Exam  GENERAL: healthy, alert and no distress  EYES: Eyes grossly normal to inspection, PERRL and conjunctivae and sclerae normal  HENT: ear canals and TM's normal, nose and mouth without ulcers or lesions  NECK: Left side inferior to the ear and along the mandible there is a 1 cm, firm, slightly mobile, mildly tender mass. No other adenopathy, no asymmetry, masses, or scars and thyroid normal to palpation  RESP: lungs clear to auscultation - no rales, rhonchi or wheezes  CV: regular rate and rhythm, normal S1 S2, no S3 or S4, no murmur, click or rub, no peripheral edema and peripheral pulses strong  ABDOMEN: soft, nontender, no hepatosplenomegaly, no masses and bowel sounds normal  MS: no gross musculoskeletal defects noted, no edema  SKIN: no suspicious lesions or rashes.  Numerous seborrheic keratoses present  NEURO: Normal strength and tone, mentation intact and speech normal  PSYCH: mentation appears normal, affect normal/bright    Diagnostic Test Results:  Labs reviewed in Epic  Results for orders placed or performed in visit on 12/12/23 (from the past 24 hour(s))   Hemoglobin A1c   Result Value Ref Range    Hemoglobin A1C 5.6 0.0 - 5.6 %       ASSESSMENT / PLAN:       ICD-10-CM    1. Encounter for Medicare annual wellness exam  Z00.00       2. Simple chronic bronchitis (H)  J41.0 beclomethasone HFA (QVAR REDIHALER) 40 MCG/ACT inhaler     albuterol (PROAIR HFA/PROVENTIL HFA/VENTOLIN HFA) 108 (90 Base) MCG/ACT inhaler      3. Hyperlipidemia LDL goal <160  E78.5 Lipid panel reflex to direct LDL Fasting     Comprehensive metabolic panel (BMP + Alb, Alk Phos, ALT, AST, Total. Bili, TP)     Lipid panel reflex to direct LDL Fasting     Comprehensive metabolic panel (BMP + Alb, Alk Phos, ALT, AST, Total. Bili, TP)      4. Prinzmetal " angina (H24)  I20.1       5. Essential hypertension, benign  I10 Comprehensive metabolic panel (BMP + Alb, Alk Phos, ALT, AST, Total. Bili, TP)     Albumin Random Urine Quantitative with Creat Ratio     Comprehensive metabolic panel (BMP + Alb, Alk Phos, ALT, AST, Total. Bili, TP)     Albumin Random Urine Quantitative with Creat Ratio      6. Elevated glucose  R73.09 Hemoglobin A1c     Hemoglobin A1c      7. Lymphadenopathy  R59.1 US Head Neck Soft Tissue     cephALEXin (KEFLEX) 500 MG capsule      8. Personal history of tobacco use  Z87.891 Prof fee: Shared Decision Making for Lung Cancer Screening     CT Chest Lung Cancer Scrn Low Dose wo        COPD:   - improved especially with cutting back on smoking.   - continue with inhalers.     HLD:  - Rechecking lab.     Angina:  - Following with Cardiology.     HTN:  - Well controlled.     Elevated glucose:  - No prediabetes but is borderline, continue to work on diet    LAD:  - She noted having antibiotics after this happened in  in the past and it resolved the issue. She denies fevers, chills, sore throat, ear pain, nasal congestion, cough, difficulty swallowing, poor saliva production.   - There is very mildly swollen mass possibly lymph node. Since this has happened previously recommended US to evaluate and verify this is a lymph node and sent antibiotic           COUNSELING:  Reviewed preventive health counseling, as reflected in patient instructions        She reports that she has been smoking cigarettes. She has a 31.00 pack-year smoking history. She has been exposed to tobacco smoke. She has never used smokeless tobacco.  Nicotine/Tobacco Cessation Plan:   She is working on this slowly. She didn't tolerate Wellbutin but it was helpful in getting her to quit smoking.       Appropriate preventive services were discussed with this patient, including applicable screening as appropriate for fall prevention, nutrition, physical activity, Tobacco-use cessation, weight  loss and cognition.  Checklist reviewing preventive services available has been given to the patient.    Reviewed patients plan of care and provided an AVS. The Intermediate Care Plan ( asthma action plan, low back pain action plan, and migraine action plan) for Love meets the Care Plan requirement. This Care Plan has been established and reviewed with the Patient.          Irma Saez PA-C  [facility]    Identified Health Risks:  I have reviewed Opioid Use Disorder and Substance Use Disorder risk factors and made any needed referrals.   Lung Cancer Screening Shared Decision Making Visit     Love Kaiser, a 73 year old female, is eligible for lung cancer screening    History   Smoking Status     Every Day     Packs/day: 1.00     Years: 31.00     Types: Cigarettes   Smokeless Tobacco     Never       I have discussed with patient the risks and benefits of screening for lung cancer with low-dose CT.     The risks include:    radiation exposure: one low dose chest CT has as much ionizing radiation as about 15 chest x-rays, or 6 months of background radiation living in [place]      false positives: most findings/nodules are NOT cancer, but some might still require additional diagnostic evaluation, including biopsy    over-diagnosis: some slow growing cancers that might never have been clinically significant will be detected and treated unnecessarily     The benefit of early detection of lung cancer is contingent upon adherence to annual screening or more frequent follow up if indicated.     Furthermore, to benefit from screening, Love must be willing and able to undergo diagnostic procedures, if indicated. Although no specific guide is available for determining severity of comorbidities, it is reasonable to withhold screening in patients who have greater mortality risk from other diseases.     We did discuss that the best way to prevent lung cancer is to not smoke.    Some  patients may value a numeric estimation of lung cancer risk when evaluating if lung cancer screening is right for them, here is one calculator:    ShouldIScreen

## 2023-12-13 ENCOUNTER — TELEPHONE (OUTPATIENT)
Dept: FAMILY MEDICINE | Facility: OTHER | Age: 73
End: 2023-12-13

## 2023-12-13 ENCOUNTER — ANCILLARY PROCEDURE (OUTPATIENT)
Dept: ULTRASOUND IMAGING | Facility: OTHER | Age: 73
End: 2023-12-13
Attending: PHYSICIAN ASSISTANT
Payer: COMMERCIAL

## 2023-12-13 DIAGNOSIS — R59.1 LYMPHADENOPATHY: ICD-10-CM

## 2023-12-13 PROCEDURE — 76536 US EXAM OF HEAD AND NECK: CPT | Mod: TC | Performed by: STUDENT IN AN ORGANIZED HEALTH CARE EDUCATION/TRAINING PROGRAM

## 2023-12-13 NOTE — TELEPHONE ENCOUNTER
Patient returned call.  I did read the provider note/instructions to her as per below.  States understanding.  Reviewed the prescription for the cephalexin with her and dosing instructions.  She will  tomorrow at [employer].  Pauline FRANK RN

## 2023-12-13 NOTE — TELEPHONE ENCOUNTER
Attempted to reach the patient with the following results:  Left message on voicemail for the patient to call back.   Jeff Joiner MA     Please call patient. Her ultrasound showed that there is possibly a stone in her parotid gland.  Would like her to suck on sour candies or armaan to help her salivate which should help. Start the antibiotics, if the swelling continues to increase please let me know right away, we may need to change the antibiotics.  Her lymph nodes are fine.  There was a nodule of her thyroid incidentally found we can evaluate at another time.     Irma Saez PA-C

## 2023-12-13 NOTE — RESULT ENCOUNTER NOTE
Attempted to reach the patient with the following results:  Left message on voicemail for the patient to call back.   Jeff Joiner MA

## 2023-12-18 ENCOUNTER — HOSPITAL ENCOUNTER (OUTPATIENT)
Dept: CT IMAGING | Facility: CLINIC | Age: 73
Discharge: HOME | End: 2023-12-18
Attending: PHYSICIAN ASSISTANT | Admitting: PHYSICIAN ASSISTANT
Payer: COMMERCIAL

## 2023-12-18 DIAGNOSIS — Z87.891 PERSONAL HISTORY OF TOBACCO USE: ICD-10-CM

## 2023-12-18 PROCEDURE — 71271 CT THORAX LUNG CANCER SCR C-: CPT

## 2024-01-09 ENCOUNTER — NURSE TRIAGE (OUTPATIENT)
Dept: FAMILY MEDICINE | Facility: OTHER | Age: 74
End: 2024-01-09
Payer: COMMERCIAL

## 2024-01-09 NOTE — TELEPHONE ENCOUNTER
Called and spoke with patient.   Rash has been present in November. States she has had this before and had used an OTC cream that cleared it up. She has been using this cream again but no improvement in the rash. Due to very poor vision patient is unable to read the label of cream to report what she is using.     She reports it is now spreading into the buttock area. Itching and blisters are present. Pain when blisters break open. Pain is also present when water touches the area during a shower.  She currently denies any fever.    Triage disposition - SEE IN OFFICE TODAY    No appointments available in clinic. Recommended urgent care evaluation, but patient declined this. She will wait to be seen in clinic. Appointment scheduled for 1/11/24. Instructed patient should symptoms worsen she needs to call back or go to urgent care.     Kimmy SANTIAGO, RN  [facility]      Reason for Disposition   Rash with painful tiny water blisters    Additional Information   Negative: Sounds like a life-threatening emergency to the triager   Negative: Followed a genital area injury (e.g., vagina, vulva)   Negative: Vaginal bleeding is main symptom   Negative: Vaginal discharge is main symptom   Negative: Pain or burning with passing urine (urination) is main symptom   Negative: Menstrual cramps is main symptom   Negative: Abdomen pain is main symptom   Negative: Pubic lice suspected   Negative: Itching or rash of female genital area (e.g., labia, vagina, vulva)   Negative: Pregnant and labor suspected   Negative: Patient sounds very sick or weak to the triager   Negative: SEVERE pain and not improved 2 hours after pain medicine   Negative: Genital area looks infected (e.g., draining sore, spreading redness) and fever   Negative: Something is hanging out of the vagina and can't easily be pushed back inside   Negative: MILD-MODERATE pain and present > 24 hours  (Exception: Chronic pain.)   Negative:  Genital area looks infected (e.g., draining sore, spreading redness)    Protocols used: Vaginal Symptoms-A-OH

## 2024-01-09 NOTE — TELEPHONE ENCOUNTER
Reason for Call:  Appointment Request    Patient requesting this type of appt:  office visit    Requested provider: Irma Saez    Reason patient unable to be scheduled: Not within requested timeframe    When does patient want to be seen/preferred time: Same day    Comments: patient has a rash in vaginal area spreading to back end. Skin is peeling off. Wanting to be worked in with Irma Saez.     Okay to leave a detailed message?: Yes at Home number on file [phone] (home)    Call taken on 1/9/2024 at 10:16 AM by Rocio Rebolledo

## 2024-01-11 ENCOUNTER — OFFICE VISIT (OUTPATIENT)
Dept: FAMILY MEDICINE | Facility: OTHER | Age: 74
End: 2024-01-11
Payer: COMMERCIAL

## 2024-01-11 VITALS
HEIGHT: 63 IN | BODY MASS INDEX: 23.21 KG/M2 | OXYGEN SATURATION: 96 % | SYSTOLIC BLOOD PRESSURE: 130 MMHG | HEART RATE: 72 BPM | RESPIRATION RATE: 16 BRPM | TEMPERATURE: 98.1 F | WEIGHT: 131 LBS | DIASTOLIC BLOOD PRESSURE: 70 MMHG

## 2024-01-11 DIAGNOSIS — J43.1 PANLOBULAR EMPHYSEMA (H): ICD-10-CM

## 2024-01-11 DIAGNOSIS — F10.21 ALCOHOL DEPENDENCE IN REMISSION (H): ICD-10-CM

## 2024-01-11 DIAGNOSIS — B37.2 YEAST DERMATITIS: ICD-10-CM

## 2024-01-11 DIAGNOSIS — N89.8 VAGINAL IRRITATION: Primary | ICD-10-CM

## 2024-01-11 DIAGNOSIS — I20.1 PRINZMETAL ANGINA (H): ICD-10-CM

## 2024-01-11 DIAGNOSIS — E78.5 HYPERLIPIDEMIA LDL GOAL <70: ICD-10-CM

## 2024-01-11 DIAGNOSIS — I10 ESSENTIAL HYPERTENSION, BENIGN: ICD-10-CM

## 2024-01-11 DIAGNOSIS — Z29.11 NEED FOR VACCINATION AGAINST RESPIRATORY SYNCYTIAL VIRUS: ICD-10-CM

## 2024-01-11 DIAGNOSIS — F17.200 NICOTINE DEPENDENCE, UNCOMPLICATED, UNSPECIFIED NICOTINE PRODUCT TYPE: ICD-10-CM

## 2024-01-11 PROBLEM — F10.20 ALCOHOL DEPENDENCE (H): Status: ACTIVE | Noted: 2024-01-11

## 2024-01-11 PROCEDURE — 99214 OFFICE O/P EST MOD 30 MIN: CPT | Performed by: PHYSICIAN ASSISTANT

## 2024-01-11 RX ORDER — FLUCONAZOLE 150 MG/1
150 TABLET ORAL
Qty: 3 TABLET | Refills: 0 | Status: SHIPPED | OUTPATIENT
Start: 2024-01-11 | End: 2024-01-18

## 2024-01-11 RX ORDER — RESPIRATORY SYNCYTIAL VIRUS VACCINE 120MCG/0.5
0.5 KIT INTRAMUSCULAR ONCE
Qty: 1 EACH | Refills: 0 | Status: CANCELLED | OUTPATIENT
Start: 2024-01-11 | End: 2024-01-11

## 2024-01-11 RX ORDER — KETOCONAZOLE 20 MG/G
CREAM TOPICAL 2 TIMES DAILY
Qty: 60 G | Refills: 0 | Status: SHIPPED | OUTPATIENT
Start: 2024-01-11

## 2024-01-11 ASSESSMENT — PAIN SCALES - GENERAL: PAINLEVEL: MODERATE PAIN (5)

## 2024-01-11 NOTE — PROGRESS NOTES
Assessment & Plan     Yeast dermatitis  Vaginal irritation  Patient has what appears to be yeast dermatitis to the GALINDO area and gluteal crease posteriorly.  Treat as noted below and follow-up in 3 to 4 weeks if not resolved.  - fluconazole (DIFLUCAN) 150 MG tablet; Take 1 tablet (150 mg) by mouth every 3 days for 3 doses  - ketoconazole (NIZORAL) 2 % external cream; Apply topically 2 times daily    Alcohol dependence in remission (H)  Patient has been sober since 2006.  I congratulated her today.  Follow-up with her primary care provider as needed.    Panlobular emphysema (H)  Stable at this point in time with no new concerns.    Hyperlipidemia LDL goal <70  Goal established.  Review of labs show LDL at 75 but HDL into the 90s.  No new concerns with respect to this.  Follow-up with PCP as needed.    Essential hypertension, benign  Stable blood pressure at this point in time with no new concerns.  Denies any need for refills.  Follow-up with primary care provider per their recommendations.    Prinzmetal angina (H24)  No new chest pain is reported.  She has no new concerns with respect to this.  Do recommend that she follow-up with her primary care provider in the near future for this as well.    Need for vaccination against respiratory syncytial virus  Advise consideration of RSV vaccine through her pharmacy.  She declines influenza and COVID vaccines today.     Isaac Guo PA-C  [facility]    De Domingov is a 73 year old, presenting for the following health issues:  Derm Problem      History of Present Illness       Reason for visit:  Personal  Symptom onset:  More than a month  Symptom intensity:  Moderate  Symptom progression:  Worsening  Had these symptoms before:  No    She eats 2-3 servings of fruits and vegetables daily.She consumes 1 sweetened beverage(s) daily.She exercises with enough effort to increase her heart rate 10 to 19 minutes per day.  She exercises with enough  "effort to increase her heart rate 3 or less days per week.   She is taking medications regularly.     Called and spoke with patient.   Rash has been present in November. States she has had this before and had used an OTC cream that cleared it up. She has been using this cream again but no improvement in the rash. Due to very poor vision patient is unable to read the label of cream to report what she is using.      She reports it is now spreading into the buttock area. Itching and blisters are present. Pain when blisters break open. Pain is also present when water touches the area during a shower.  She currently denies any fever.     Triage disposition - SEE IN OFFICE TODAY     No appointments available in clinic. Recommended urgent care evaluation, but patient declined this. She will wait to be seen in clinic. Appointment scheduled for 1/11/24. Instructed patient should symptoms worsen she needs to call back or go to urgent care.      Kimmy SANTIAGO, RN  [facility]       Reason for Disposition   Rash with painful tiny water blisters    Review of Systems   Constitutional, HEENT, cardiovascular, pulmonary, GI, , musculoskeletal, neuro, skin, endocrine and psych systems are negative, except as otherwise noted.      Objective    /70 (BP Location: Right arm, Patient Position: Sitting, Cuff Size: Adult Regular)   Pulse 72   Temp 98.1  F (36.7  C) (Temporal)   Resp 16   Ht 1.6 m (5' 3\")   Wt 59.4 kg (131 lb)   SpO2 96%   BMI 23.21 kg/m    Body mass index is 23.21 kg/m .  Physical Exam   GENERAL: healthy, alert and no distress  RESP: lungs clear to auscultation - no rales, rhonchi or wheezes  CV: regular rate and rhythm, normal S1 S2, no S3 or S4, no murmur, click or rub, no peripheral edema and peripheral pulses strong  MS: no gross musculoskeletal defects noted, no edema  SKIN: no suspicious lesions or rashes other than what appears to be yeast dermatitis to the " perivaginal area, russ rectal region and gluteal crease  NEURO: Normal strength and tone, mentation intact and speech normal  PSYCH: mentation appears normal, affect normal/bright    No results found for any visits on 01/11/24.

## 2024-03-28 ENCOUNTER — TELEPHONE (OUTPATIENT)
Dept: FAMILY MEDICINE | Facility: OTHER | Age: 74
End: 2024-03-28

## 2024-03-28 ENCOUNTER — OFFICE VISIT (OUTPATIENT)
Dept: FAMILY MEDICINE | Facility: OTHER | Age: 74
End: 2024-03-28
Payer: COMMERCIAL

## 2024-03-28 VITALS
HEART RATE: 82 BPM | TEMPERATURE: 99.3 F | BODY MASS INDEX: 23.03 KG/M2 | RESPIRATION RATE: 16 BRPM | WEIGHT: 130 LBS | OXYGEN SATURATION: 95 % | DIASTOLIC BLOOD PRESSURE: 62 MMHG | SYSTOLIC BLOOD PRESSURE: 118 MMHG

## 2024-03-28 DIAGNOSIS — N30.01 ACUTE CYSTITIS WITH HEMATURIA: Primary | ICD-10-CM

## 2024-03-28 PROBLEM — S52.532A CLOSED COLLES' FRACTURE OF LEFT RADIUS, INITIAL ENCOUNTER: Status: RESOLVED | Noted: 2019-01-30 | Resolved: 2024-03-28

## 2024-03-28 LAB
ALBUMIN UR-MCNC: 100 MG/DL
APPEARANCE UR: ABNORMAL
BACTERIA #/AREA URNS HPF: ABNORMAL /HPF
BILIRUB UR QL STRIP: NEGATIVE
COLOR UR AUTO: YELLOW
GLUCOSE UR STRIP-MCNC: NEGATIVE MG/DL
HGB UR QL STRIP: ABNORMAL
KETONES UR STRIP-MCNC: NEGATIVE MG/DL
LEUKOCYTE ESTERASE UR QL STRIP: ABNORMAL
NITRATE UR QL: NEGATIVE
PH UR STRIP: 7 [PH] (ref 5–7)
RBC #/AREA URNS AUTO: ABNORMAL /HPF
SP GR UR STRIP: 1.01 (ref 1–1.03)
UROBILINOGEN UR STRIP-ACNC: 0.2 E.U./DL
WBC #/AREA URNS AUTO: ABNORMAL /HPF
WBC CLUMPS #/AREA URNS HPF: PRESENT /HPF

## 2024-03-28 PROCEDURE — 87086 URINE CULTURE/COLONY COUNT: CPT | Performed by: FAMILY MEDICINE

## 2024-03-28 PROCEDURE — 99213 OFFICE O/P EST LOW 20 MIN: CPT | Performed by: FAMILY MEDICINE

## 2024-03-28 PROCEDURE — 81001 URINALYSIS AUTO W/SCOPE: CPT | Performed by: FAMILY MEDICINE

## 2024-03-28 RX ORDER — ATORVASTATIN CALCIUM 40 MG/1
40 TABLET, FILM COATED ORAL DAILY
COMMUNITY
Start: 2024-02-05

## 2024-03-28 RX ORDER — NITROGLYCERIN 0.4 MG/1
0.4 TABLET SUBLINGUAL EVERY 5 MIN PRN
COMMUNITY
Start: 2023-11-30

## 2024-03-28 RX ORDER — CEFDINIR 300 MG/1
300 CAPSULE ORAL 2 TIMES DAILY
Qty: 14 CAPSULE | Refills: 0 | Status: SHIPPED | OUTPATIENT
Start: 2024-03-28 | End: 2024-04-04

## 2024-03-28 ASSESSMENT — PAIN SCALES - GENERAL: PAINLEVEL: MODERATE PAIN (5)

## 2024-03-28 NOTE — PROGRESS NOTES
Assessment & Plan     Acute cystitis with hematuria  Urine consistent with infection.  With poor appetite and possible costovertebral angle tenderness, will treat for possible early pyelonephritis with Omnicef.  Encourage hydration.  If pain worsens, would then recommend ED evaluation.    - UA Macroscopic with reflex to Microscopic and Culture - Lab Collect  - Urine Microscopic Exam  - Urine Culture  - cefdinir (OMNICEF) 300 MG capsule; Take 1 capsule (300 mg) by mouth 2 times daily for 7 days      Subjective   Nelia is a 74 year old, presenting for the following health issues:  UTI        3/28/2024     9:20 AM   Additional Questions   Roomed by josh dominguez     History of Present Illness       Reason for visit:  Urinary issues  Symptom onset:  3-7 days ago  Symptom intensity:  Moderate  Symptom progression:  Worsening  Had these symptoms before:  No  What makes it worse:  No  What makes it better:  No    She eats 0-1 servings of fruits and vegetables daily.She consumes 3 sweetened beverage(s) daily.She exercises with enough effort to increase her heart rate 10 to 19 minutes per day.  She exercises with enough effort to increase her heart rate 3 or less days per week.   She is taking medications regularly.       Pre-Provider Visit Orders- Urinalysis UA/UC  Patient reports the following symptoms:  frequent urination   Does the patient report any of the following symptoms: vaginal discharge, vaginal itching, possible yeast infection, has a urinary catheter in place, or unable to void in a specimen cup?  No      Genitourinary - Female  Onset/Duration: 1 week ago  Description:   Painful urination (Dysuria): No, not at first, when she is finished and gets up, she will then need to go again and that is when it is extremely painful           Frequency: YES  Blood in urine (Hematuria): No  Delay in urine (Hesitency): YES  Intensity: severe  Progression of Symptoms:  same  Accompanying Signs & Symptoms:  Fever/chills:  YES  Flank pain: YES  Nausea and vomiting: YES  Vaginal symptoms: none  Abdominal/Pelvic Pain: YES  History:   History of frequent UTI s: No  History of kidney stones: No  Sexually Active: No  Possibility of pregnancy: No  Precipitating or alleviating factors: None  Therapies tried and outcome:  don't know what to try       Patient states that 5 days ago she began having urinary symptoms.  She states that she will need to urinate will go and things will be fine and then she will get up and she will have an urgency to go again.  She will then sit down and urinate and then she will have stomach pain that she just does not feel well for about a minute and then it passes.  Sometimes when she is sitting and she is having the pain and she pushes on her stomach she will then have a bowel movement.  She denies diarrhea or constipation.  Patient is legally blind and has not noticed any blood in her urine.  She states that she has not had an appetite for the last few days.  She had nausea 1 day but denies nausea.  Denies any vomiting.  She denies fevers.  She just is not feeling well with her stomach discomfort.      Objective    /62 (BP Location: Right arm, Patient Position: Sitting, Cuff Size: Adult Regular)   Pulse 82   Temp 99.3  F (37.4  C) (Temporal)   Resp 16   Wt 59 kg (130 lb)   SpO2 95%   BMI 23.03 kg/m    Body mass index is 23.03 kg/m .  Physical Exam   Gen: no apparent distress  ABDOMEN: mild tenderness in the suprapubic area, without rebound, guarding, mass or organomegaly. Abdomen is soft and bowel sounds are normal.   Back: Patient has tenderness over her back generally and not necessarily only in the costovertebral angle area.    Results for orders placed or performed in visit on 03/28/24 (from the past 24 hour(s))   UA Macroscopic with reflex to Microscopic and Culture - Lab Collect    Specimen: Urine, NOS   Result Value Ref Range    Color Urine Yellow Colorless, Straw, Light Yellow, Yellow     Appearance Urine Slightly Cloudy (A) Clear    Glucose Urine Negative Negative mg/dL    Bilirubin Urine Negative Negative    Ketones Urine Negative Negative mg/dL    Specific Gravity Urine 1.015 1.003 - 1.035    Blood Urine Large (A) Negative    pH Urine 7.0 5.0 - 7.0    Protein Albumin Urine 100 (A) Negative mg/dL    Urobilinogen Urine 0.2 0.2, 1.0 E.U./dL    Nitrite Urine Negative Negative    Leukocyte Esterase Urine Moderate (A) Negative   Urine Microscopic Exam   Result Value Ref Range    Bacteria Urine Few (A) None Seen /HPF    RBC Urine 10-25 (A) 0-2 /HPF /HPF    WBC Urine  (A) 0-5 /HPF /HPF    WBC Clumps Urine Present (A) None Seen /HPF           Signed Electronically by: Monique Stuart MD

## 2024-03-28 NOTE — TELEPHONE ENCOUNTER
Reason for Call:  Appointment Request    Patient requesting this type of appt:  office visit    Requested provider: Irma Saez    Reason patient unable to be scheduled: Not with their preferred provider    When does patient want to be seen/preferred time: Same day    Comments: patient has an appointment at 3:10 has urine frequency, pain most of the timein vaginal area, burning during urination. Has been incontinent. Been going on since Friday. Would prefer female provider and a little sooner    Okay to leave a detailed message?: Yes at Home number on file [phone] (home)    Call taken on 3/28/2024 at 6:44 AM by Rosemary Mckinney

## 2024-03-29 LAB — BACTERIA UR CULT: NORMAL

## 2024-04-15 ENCOUNTER — TELEPHONE (OUTPATIENT)
Dept: FAMILY MEDICINE | Facility: OTHER | Age: 74
End: 2024-04-15
Payer: COMMERCIAL

## 2024-04-15 NOTE — TELEPHONE ENCOUNTER
I received a [facility] After Visit Summary on this patient in my inbasket without any encounter, what is the request from this? Does the patient need a visit?      Irma Saez PA-C

## 2024-04-16 NOTE — TELEPHONE ENCOUNTER
Patient dropped off copy of after visit summary for provider. Patient just wanted provider to be aware of visit at [facility]. Please send to scanning when done.

## 2024-04-16 NOTE — TELEPHONE ENCOUNTER
Ok sounds good, if she needs follow-up I'm happy to see her in a same day or blocked slot.     GARRY ScottC

## 2024-04-23 ENCOUNTER — PATIENT OUTREACH (OUTPATIENT)
Dept: GASTROENTEROLOGY | Facility: CLINIC | Age: 74
End: 2024-04-23
Payer: COMMERCIAL

## 2024-04-23 DIAGNOSIS — Z12.11 SPECIAL SCREENING FOR MALIGNANT NEOPLASMS, COLON: Primary | ICD-10-CM

## 2024-04-23 NOTE — TELEPHONE ENCOUNTER
"CRC Screening Colonoscopy Referral Review    Patient meets the inclusion criteria for screening colonoscopy standing order.    Ordering/Referring Provider:  Irma Saez    BMI: Estimated body mass index is 23.03 kg/m  as calculated from the following:    Height as of 1/11/24: 1.6 m (5' 3\").    Weight as of 3/28/24: 59 kg (130 lb).     Sedation:  Does patient have any of the following conditions affecting sedation?  Hx of chemical dependency: MAC sedation recommended    Previous Scopes:  Any previous recommendations or follow up needs based on previous scope?  na / No recommendations.    Medical Concerns to Postpone Order:  Does patient have any of the following medical concerns that should postpone/delay colonoscopy referral?  No medical conditions affecting colonoscopy referral.    Final Referral Details:  Based on patient's medical history patient is appropriate for referral order with MAC/deep sedation.   BMI<= 45 45 < BMI <= 48 48 < BMI < = 50  BMI > 50   No Restrictions No [facility]  No [facility]  [facility] with exceptions Hospital Only OR Only      "

## 2024-04-23 NOTE — LETTER
4/23/2024         RE: Love Kaiser  [address] [facility] [address]        Brandy Aleman,    We hope this letter finds you doing well.     Your health is important to us at [facility]. Our records indicate that you could be due, or possibly overdue, for a screening or surveillance colonoscopy in July 2024.     An order has been placed for you to have this completed. Our scheduling team will be reaching out to you to assist in getting this scheduled. If you do not hear from them within 7 days or you would like to schedule sooner, please call [phone], option 2 for endoscopy scheduling.     If you believe this is in error and have already had a colonoscopy done, please have your results and recommendations faxed to [phone].    If you have questions about this order or have further concerns, please reach out to your primary care provider.     Heron     Colorectal Cancer RN Screening Team  [facility]

## 2024-04-24 ENCOUNTER — TELEPHONE (OUTPATIENT)
Dept: GASTROENTEROLOGY | Facility: CLINIC | Age: 74
End: 2024-04-24
Payer: COMMERCIAL

## 2024-04-24 DIAGNOSIS — Z12.11 SPECIAL SCREENING FOR MALIGNANT NEOPLASMS, COLON: Primary | ICD-10-CM

## 2024-04-24 NOTE — TELEPHONE ENCOUNTER
"Endoscopy Scheduling Screen    Have you had a positive Covid test in the last 14 days?  No    What is your communication preference for Instructions and/or Bowel Prep?   Mail/USPS    What insurance is in the chart?  Other:  [facility]    Ordering/Referring Provider: Irma Saez PA-C     (If ordering provider performs procedure, schedule with ordering provider unless otherwise instructed. )    BMI: Estimated body mass index is 23.03 kg/m  as calculated from the following:    Height as of 1/11/24: 1.6 m (5' 3\").    Weight as of 3/28/24: 59 kg (130 lb).     Sedation Ordered  MAC/deep sedation.   BMI<= 45 45 < BMI <= 48 48 < BMI < = 50  BMI > 50   No Restrictions No [facility]  No [facility]  [facility] with exceptions Hospital Only OR Only       Do you have a history of malignant hyperthermia?  No    (Females) Are you currently pregnant?   No     Have you been diagnosed or told you have pulmonary hypertension?   No    Do you have an LVAD?  No    Have you been told you have moderate to severe sleep apnea?  No    Have you been told you have COPD, asthma, or any other lung disease?  Yes     What breathing problems do you have?  COPD     Do you use home oxygen?  No    Have your breathing problems required an ED visit or hospitalization in the last year?  No    Do you have any heart conditions?  Yes     In the past year, have you had any hospitalizations for heart related issues including cardiomyopathy, heart attack, or stent placement?  No    Do you have any implantable devices in your body (pacemaker, ICD)?  No    Do you take nitroglycerine?  Yes, less than 1 time per week    Have you ever had or are you waiting for an organ transplant?  No. Continue scheduling, no site restrictions.    Have you had a stroke or transient ischemic attack (TIA aka \"mini stroke\" in the last 6 months?   No    Have you been diagnosed with or been told you have cirrhosis of the liver?   No    Are you currently on dialysis?   No    Do you need " "assistance transferring?   No    BMI: Estimated body mass index is 23.03 kg/m  as calculated from the following:    Height as of 1/11/24: 1.6 m (5' 3\").    Weight as of 3/28/24: 59 kg (130 lb).     Is patients BMI > 40 and scheduling location [facility]?  No    Do you take an injectable medication for weight loss or diabetes (excluding insulin)?  No    Do you take the medication Naltrexone?  No    Do you take blood thinners?  No       Prep   Are you currently on dialysis or do you have chronic kidney disease?  No    Do you have a diagnosis of diabetes?  No    Do you have a diagnosis of cystic fibrosis (CF)?  No    On a regular basis do you go 3 -5 days between bowel movements?  Yes (Extended Prep)    BMI > 40?  No    Preferred Pharmacy:      [facility] [address] [address]  Phone: [phone] Fax: [phone]      Final Scheduling Details     Procedure scheduled  Colonoscopy    Surgeon:  ASH     Date of procedure:  6/27     Pre-OP / PAC:   No - Not required for this site.    Location  [facility] - Patient preference.    Sedation   MAC/Deep Sedation - Per order.      Patient Reminders:   You will receive a call from a Nurse to review instructions and health history.  This assessment must be completed prior to your procedure.  Failure to complete the Nurse assessment may result in the procedure being cancelled.      On the day of your procedure, please designate an adult(s) who can drive you home stay with you for the next 24 hours. The medicines used in the exam will make you sleepy. You will not be able to drive.      You cannot take public transportation, ride share services, or non-medical taxi service without a responsible caregiver.  Medical transport services are allowed with the requirement that a responsible caregiver will receive you at your destination.  We require that drivers and caregivers are confirmed prior to your procedure.    "

## 2024-04-24 NOTE — LETTER
May 30, 2024      Love Kaiser  [address] [facility] [address]              Golytely Extended Colonoscopy Prep  Prep instructions for colonoscopy   Pre-Assessment Phone Number: [facility]; [phone]  Bowel prep sent to [facility] in [place].      Please read these instructions carefully at least 7 days prior to your colonoscopy procedure. Be sure to follow all directions completely. The inside of your colon must be clean to allow for a complete examination for the presence of any growths, polyps, and/or abnormalities, as well as their biopsy or removal. A number of tips are included in order to make this part of the procedure as comfortable as possible.    Immediately:  A nurse will call you to go over instructions and your health history.  It's important to complete the nurse assessment before your procedure. If you don't, your procedure might have to be canceled.  You must arrange for an adult to drive you home after your exam. Your colonoscopy cannot be done unless you have a ride. If you need to use public transportation, someone must ride with you and stay with you for a minimum of 6-24 hours.  Check with your insurance company to be sure they will cover this exam.Arrange for a responsible adult to drive you home on the day of the exam. This cannot be a taxi or a bus as you will need someone with you after the procedure.    7 days prior:  Talk to your prescribing provider: If you take blood thinners (such as Coumadin, Plavix, Xarelto, Eliquis, Lovenox, or others), these medications may need to be stopped temporarily before your procedure. Your prescribing provider will tell you what to do.   Talk to your prescribing provider: If you take prescription NSAIDS (such as Sulindac, Celebrex, Mobic, Relafen). Your prescribing provider will tell you what to do.   If you have diabetes, you should request an early morning appointment.  Stop taking fiber supplements and multivitamins  containing iron, or any other medications containing iron.  Fill your prescription for 2 containers of Golytely and 4 Dulcolax tablets at the pharmacy.  It is very important that you stay well hydrated during the colonoscopy prep. The Golytely bowel prep is designed to clean out your colon, but it will not provide hydration. While you are taking the prescribed prep, you should also drink 64 oz. of Gatorade or similar sports drink product to drink for staying hydrated. (Avoid red and purple colors)  Stop eating corn, popcorn, nuts and foods with seeds.   Begin a restricted or low fiber diet (see list below).    2 days prior:  Drink fluids to be well hydrated, this is important. Drink at least 4 large glasses of water, Gatorade, or other similar sports drinks.  It is a good idea to use Vaseline on the skin around your anus after each bowel movement to prevent irritation. Wet wipes also help to reduce irritation.   You can have a light, low-fiber breakfast. You may also have a light, low-fiber lunch.  No solid foods after 1pm. Begin a clear liquid diet. (see list below).  At 4pm, take 2 Dulcolax (bisacodyl) tablets.  At 5pm, mix and drink half of a jug of Golytely bowel prep. Drink an 8 oz. Glass of Golytely every 10-15 minutes until half of the jug is gone. Place the remainder of the Golytely in the refrigerator. Stay close to the bathroom.     1 day prior:  Continue clear liquid diet only (see examples below). Do not eat solid food this day.  Do not drink any red or purple colored drinks.  Stop taking NSAID pain relievers, such as Advil, Ibuprofen, Motrin, etc.  You may take Tylenol.  At 4 pm, take 2 Dulcolax (bisacodyl) tablets.  At 5 pm, drink the 2nd half of a jug of Golytely bowel prep. Drink an 8 oz. glass of Golytely every 10-15 minutes until the 1st jug of Golytely is gone.   The Golytely bowel prep will not keep you hydrated. You should drink 8-10 glasses of clear liquids throughout the day.  Before you go to  bed, mix the 2nd container of Golytely and place in the refrigerator for the morning.      Procedure day:  6 hours before your check-in time, drink an 8 oz. Glass of Golytely every 10-15 minutes until half of the 2nd jug of Golytely is gone. You WILL NOT drink the entire 2nd jug of Golytely.   You may take your necessary morning medications with sips of water  Do not take diabetes medicine by mouth until after your exam.  You may drink clear liquids only up until 2 hours before your arrival time.  Do not smoke or swallow anything, including water or gum for at least 2 hours before your arrival time. This is a safety issue. Your procedure could be cancelled if you do not follow directions.  No chewing tobacco 6 hours prior to procedure arrival time.   Please do not wear jewelry (i.e. earrings, rings, necklaces, watches, etc) . Leave your purse, billfold, credit cards, and other valuables at home.   Please arrive with a responsible adult who can take you home after the test and stay with you for a minimum of 24 hours: The medicine used will make you sleepy and forgetful. If you do not have someone to take you home, we will cancel your procedure. If using public transportation you must have someone to ride with you.  Please perform your nebulizer treatments and airway clearance therapy in the morning prior to the procedure (if applicable).  If you have asthma, bring your inhalers.    CLEAR LIQUID DIET   You may have:  Water, tea, coffee (no milk or cream)  Soda pop, Gatorade (not red or purple)  Jell-O, Popsicles (no milk or fruit pieces - not red or purple)  Fat-free soup broth or bouillon  Plain hard candy, such as clear life savers (not red or purple)  Clear juices and fruit-flavored drinks, such as apple juice, white grape juice, Hi-C, and Juan Jose-Aid (not red or purple)   Do not have:  Milk or milk products such as ice cream, malts or shakes, or coffee creamer  Red or purple drinks of any kind such as cranberry juice  or grape juice. Avoid red or purple Jell-O, Popsicles, Juan Jose-Aid, sorbet, sherbet and candy  Juices with pulp such as orange, grapefruit, pineapple or tomato juice  Cream soups of any kind  Alcohol and beer  Protein drinks or protein powder     LOW FIBER DIET   You may have:    Starches: White bread, rolls, biscuits, croissants, [place] toast, white flour tortillas, waffles, pancakes, Tamazight toast; white rice, noodles, pasta, macaroni; cooked and peeled potatoes; plain crackers, saltines; cooked farina or cream of rice; puffed rice, corn flakes, Rice Krispies, Special K   Vegetables: tender cooked and canned, vegetable broths  Fruits and fruit juices: Strained fruit juice, canned fruit without seeds or skin (not pineapple), applesauce, pear sauce, ripe bananas, melons (not watermelon)   Milk products: Milk (plain or flavored), cheese, cottage cheese, yogurt (no berries), custard, ice cream    Proteins: Tender, well-cooked ground beef, lamb, veal, ham, pork, chicken, turkey, fish or organ meats; eggs; creamy peanut butter   Fats and condiments:  Margarine, butter, oils, mayonnaise, sour cream, salad dressing, plain gravy; spices, cooked herbs; sugar, clear jelly, honey, syrup   Snacks, sweets and drinks: Pretzels, hard candy; plain cakes and cookies (no nuts or seeds); gelatin, plain pudding, sherbet, Popsicles; coffee, tea, carbonated ( fizzy ) drinks Do not have:    Starches: Breads or rolls that contain nuts, seeds or fruit; whole wheat or whole grain breads that contain more than 1 gram of fiber per slice; cornbread; corn or whole wheat tortillas; potatoes with skin; brown rice, wild rice, kasha (buckwheat), and oatmeal   Vegetables: Any raw or steamed vegetables; vegetables with seeds; corn in any form   Fruits and fruit juices: Prunes, prune juice, raisins and other dried fruits, berries and other fruits with seeds, canned pineapple juices with pulp such as orange, grapefruit, pineapple or tomato juice  Milk  products: Any yogurt with nuts, seeds or berries   Proteins: Tough, fibrous meats with gristle; cooked dried beans, peas or lentils; crunchy peanut butter  Fats and condiments: Pickles, olives, relish, horseradish; jam, marmalade, preserves   Snacks, sweets and drinks: Popcorn, nuts, seeds, granola, coconut, candies made with nuts or seeds; all desserts that contain nuts, seeds, raisins and other dried fruits, coconut, whole grains or bran.      FAQ:    How do you know if your colon is cleaned out?   After completing the bowel prep, your bowel movements should be all liquid and yellow. Your bowel movements will look similar to urine in the toilet. If there are pieces of stool (poop) in the toilet, or if you can't see to the bottom of the toilet, please call our office for advice. Call [phone] and ask to speak with a nurse.   Why do you need a responsible  to take you home and stay with you?  We require a responsible adult to take you home for your safety. The sedation medicines used to relax you during the procedure can impair your judgement and reaction time, make you forgetful and possible a little unsteady. Do not drive, make any important decisions, or sign any legal documents for 24 hours after your procedure.   It is normal to feel bloated and gassy after your procedure. Walking will help move the air through your colon. You can take non-aspirin pain relievers that contain acetaminophen (Tylenol).   When can you eat after your procedure?  You may resume your normal diet when you feel ready, unless advised otherwise by the doctor performing your procedure. Do not drink alcohol for 24 hours after your procedure.   You many resume normal activities (work, exercise, etc.) after 24 hours.   When will you get test results?  You should have your procedure results and any lab results (if applicable) by letter, MyChart message, or phone call within 2 weeks. If you have any questions, please call the doctor  that referred you for the procedure.       Thank you for choosing [facility], for your procedure. If you are sent a survey regarding your care, please take the time to complete the questionnaire. We value your feedback!

## 2024-05-30 RX ORDER — BISACODYL 5 MG/1
TABLET, DELAYED RELEASE ORAL
Qty: 4 TABLET | Refills: 0 | Status: ON HOLD | OUTPATIENT
Start: 2024-05-30 | End: 2024-06-27

## 2024-05-30 NOTE — TELEPHONE ENCOUNTER
Extended Golytely Bowel Prep . Instructions were sent via letter. Bowel prep was sent 5/30/2024 to    [facility] [address] [place] [id number] [facility]

## 2024-06-26 ENCOUNTER — ANESTHESIA EVENT (OUTPATIENT)
Dept: GASTROENTEROLOGY | Facility: CLINIC | Age: 74
End: 2024-06-26
Payer: COMMERCIAL

## 2024-06-26 RX ORDER — NALOXONE HYDROCHLORIDE 0.4 MG/ML
0.2 INJECTION, SOLUTION INTRAMUSCULAR; INTRAVENOUS; SUBCUTANEOUS
Status: CANCELLED | OUTPATIENT
Start: 2024-06-26

## 2024-06-26 RX ORDER — ONDANSETRON 2 MG/ML
4 INJECTION INTRAMUSCULAR; INTRAVENOUS EVERY 6 HOURS PRN
Status: CANCELLED | OUTPATIENT
Start: 2024-06-26

## 2024-06-26 RX ORDER — ONDANSETRON 4 MG/1
4 TABLET, ORALLY DISINTEGRATING ORAL EVERY 6 HOURS PRN
Status: CANCELLED | OUTPATIENT
Start: 2024-06-26

## 2024-06-26 RX ORDER — NALOXONE HYDROCHLORIDE 0.4 MG/ML
0.4 INJECTION, SOLUTION INTRAMUSCULAR; INTRAVENOUS; SUBCUTANEOUS
Status: CANCELLED | OUTPATIENT
Start: 2024-06-26

## 2024-06-26 RX ORDER — PROCHLORPERAZINE MALEATE 5 MG
5 TABLET ORAL EVERY 6 HOURS PRN
Status: CANCELLED | OUTPATIENT
Start: 2024-06-26

## 2024-06-26 RX ORDER — FLUMAZENIL 0.1 MG/ML
0.2 INJECTION, SOLUTION INTRAVENOUS
Status: CANCELLED | OUTPATIENT
Start: 2024-06-26 | End: 2024-06-27

## 2024-06-26 ASSESSMENT — LIFESTYLE VARIABLES: TOBACCO_USE: 1

## 2024-06-26 ASSESSMENT — COPD QUESTIONNAIRES
CAT_SEVERITY: MODERATE
COPD: 1

## 2024-06-26 NOTE — ANESTHESIA PREPROCEDURE EVALUATION
Anesthesia Pre-Procedure Evaluation    Patient: Love Kaiser   MRN: [id number] : 1950        Procedure : Procedure(s):  Colonoscopy          Past Medical History:   Diagnosis Date    Acute alcoholic hepatitis (H28)     Acute pancreatitis 06    aDMIT. dISCHARGED 08/10/06    Cancer of parotid gland (H)     Chronic airway obstruction, not elsewhere classified     COPD (chronic obstructive pulmonary disease) (H) 3/30/2009    Diarrhea     Hyposmolality and/or hyponatremia     Macular degeneration 2016    Other and unspecified alcohol dependence, continuous drinking behavior     Other chronic nonalcoholic liver disease     Unspecified protein-calorie malnutrition (H24)       Past Surgical History:   Procedure Laterality Date    ARTHROPLASTY SHOULDER Right 2015    Procedure: ARTHROPLASTY SHOULDER;  Surgeon: Ashutosh Andrews DO;  [facility]    CL AFF SURGICAL PATHOLOGY      parotid tumor with malignancy    COLONOSCOPY  09    COLONOSCOPY N/A 2016    Procedure: COMBINED COLONOSCOPY, SINGLE OR MULTIPLE BIOPSY/POLYPECTOMY BY BIOPSY;  Surgeon: John Bellamy MD;  [facility]    COLONOSCOPY N/A 2019    Procedure: COLONOSCOPY;  Surgeon: Kia Jean MD;  [facility]    EXCISE MASS FOOT  2012    Procedure:EXCISE MASS FOOT; Excision of Mass Right Foot; Surgeon:CARRIE PAGAN; Location:[facility]    FOREIGN BODY REMOVAL  07/08/10    Soft tissue mass w/peripheral metal fragments    HC UGI ENDOSCOPY DIAG W BIOPSY  06    HYSTERECTOMY, PAP NO LONGER INDICATED      HYSTERECTOMY, FIDE      OPEN REDUCTION INTERNAL FIXATION WRIST Left 2019    Procedure: Left distal radius open reduction and internal fixation;  Surgeon: Ashutosh Andrews DO;  [facility]    ZZC REMOVAL OF OVARY(S)      ZZHC COLONOSCOPY W BIOPSY  06    ZZHC COLONOSCOPY W BIOPSY  10/31/07      Allergies   Allergen Reactions    No Known Drug Allergy     Nickel  Itching, Rash and Swelling      Social History     Tobacco Use    Smoking status: Every Day     Current packs/day: 0.25     Average packs/day: 0.3 packs/day for 31.0 years (7.8 ttl pk-yrs)     Types: Cigarettes     Passive exposure: Current    Smokeless tobacco: Never   Substance Use Topics    Alcohol use: No     Alcohol/week: 23.3 standard drinks of alcohol     Comment: quit drinking      Wt Readings from Last 1 Encounters:   03/28/24 59 kg (130 lb)        Anesthesia Evaluation   Pt has had prior anesthetic. Type: General and MAC.    History of anesthetic complications       ROS/MED HX  ENT/Pulmonary: Comment: Legally blind    (+)                tobacco use, Current use,        moderate,  COPD,              Neurologic:  - neg neurologic ROS     Cardiovascular: Comment: H/O prinzmetal agina    (+) Dyslipidemia hypertension- -  CAD angina-at rest.  - -                                 Previous cardiac testing   Echo: Date: Results:    Stress Test:  Date: Results:    ECG Reviewed:  Date: 2019 Results:  SR  Cath:  Date: Results:      METS/Exercise Tolerance:     Hematologic:  - neg hematologic  ROS     Musculoskeletal:  - neg musculoskeletal ROS     GI/Hepatic:     (+) GERD, Asymptomatic on medication,           liver disease,       Renal/Genitourinary:  - neg Renal ROS     Endo:  - neg endo ROS     Psychiatric/Substance Use:     (+)   alcohol abuse      Infectious Disease:  - neg infectious disease ROS     Malignancy:   (+) Malignancy, History of Other.Other CA Parotid CA Remission status post Surgery.    Other:  - neg other ROS        Physical Exam    Airway  airway exam normal      Mallampati: II   TM distance: > 3 FB   Neck ROM: full   Mouth opening: > 3 cm    Respiratory Devices and Support         Dental     Comment: Upper and lower dentures     (+) Edentulous      Cardiovascular   cardiovascular exam normal       Rhythm and rate: regular and normal     Pulmonary   pulmonary exam normal        breath sounds clear  "to auscultation         OUTSIDE LABS:  CBC:   Lab Results   Component Value Date    WBC 6.9 11/29/2022    WBC 7.0 06/30/2021    HGB 14.7 11/29/2022    HGB 14.6 06/30/2021    HCT 42.5 11/29/2022    HCT 42.0 06/30/2021     11/29/2022     06/30/2021     BMP:   Lab Results   Component Value Date     12/12/2023     11/29/2022    POTASSIUM 4.0 12/12/2023    POTASSIUM 3.7 11/29/2022    CHLORIDE 101 12/12/2023    CHLORIDE 105 11/29/2022    CO2 26 12/12/2023    CO2 29 11/29/2022    BUN 5.8 (L) 12/12/2023    BUN 8 11/29/2022    CR 0.80 12/12/2023    CR 0.73 11/29/2022     (H) 12/12/2023     (H) 11/29/2022     COAGS:   Lab Results   Component Value Date    PTT 32 01/10/2007    INR 0.93 08/24/2016     POC: No results found for: \"BGM\", \"HCG\", \"HCGS\"  HEPATIC:   Lab Results   Component Value Date    ALBUMIN 4.7 12/12/2023    PROTTOTAL 6.8 12/12/2023    ALT 12 12/12/2023    AST 17 12/12/2023     (H) 08/09/2006    ALKPHOS 64 12/12/2023    BILITOTAL 0.5 12/12/2023     OTHER:   Lab Results   Component Value Date    PH 6.0 10/25/2001    A1C 5.6 12/12/2023    SHAWNA 9.6 12/12/2023    PHOS 2.9 08/10/2006    MAG 2.5 (H) 08/09/2006    LIPASE 83 01/10/2007    AMYLASE 71 01/10/2007    TSH 3.79 01/29/2015    CRP <2.9 06/30/2021    SED 5 06/30/2021       Anesthesia Plan    ASA Status:  3    NPO Status:  NPO Appropriate    Anesthesia Type: MAC.     - Reason for MAC: straight local not clinically adequate      Maintenance: TIVA.        Consents    Anesthesia Plan(s) and associated risks, benefits, and realistic alternatives discussed. Questions answered and patient/representative(s) expressed understanding.     - Discussed:     - Discussed with:  Patient       Use of blood products discussed: No .     Postoperative Care            Comments:    Other Comments: The risks and benefits of anesthesia, and the alternatives where applicable, have been discussed with the patient, and they wish to proceed. "         Juan Wayne, APRN CRNA    I have reviewed the pertinent notes and labs in the chart from the past 30 days and (re)examined the patient.  Any updates or changes from those notes are reflected in this note.

## 2024-06-27 ENCOUNTER — HOSPITAL ENCOUNTER (OUTPATIENT)
Facility: CLINIC | Age: 74
Discharge: HOME | End: 2024-06-27
Attending: SURGERY | Admitting: SURGERY
Payer: COMMERCIAL

## 2024-06-27 ENCOUNTER — ANESTHESIA (OUTPATIENT)
Dept: GASTROENTEROLOGY | Facility: CLINIC | Age: 74
End: 2024-06-27
Payer: COMMERCIAL

## 2024-06-27 VITALS
SYSTOLIC BLOOD PRESSURE: 158 MMHG | RESPIRATION RATE: 12 BRPM | DIASTOLIC BLOOD PRESSURE: 84 MMHG | OXYGEN SATURATION: 98 % | HEART RATE: 69 BPM | TEMPERATURE: 97.8 F

## 2024-06-27 LAB — COLONOSCOPY: NORMAL

## 2024-06-27 PROCEDURE — 250N000011 HC RX IP 250 OP 636: Performed by: NURSE ANESTHETIST, CERTIFIED REGISTERED

## 2024-06-27 PROCEDURE — 88305 TISSUE EXAM BY PATHOLOGIST: CPT | Mod: 26 | Performed by: PATHOLOGY

## 2024-06-27 PROCEDURE — 45385 COLONOSCOPY W/LESION REMOVAL: CPT | Mod: PT | Performed by: SURGERY

## 2024-06-27 PROCEDURE — 250N000009 HC RX 250: Performed by: NURSE ANESTHETIST, CERTIFIED REGISTERED

## 2024-06-27 PROCEDURE — 370N000017 HC ANESTHESIA TECHNICAL FEE, PER MIN: Performed by: SURGERY

## 2024-06-27 PROCEDURE — 258N000003 HC RX IP 258 OP 636: Performed by: NURSE ANESTHETIST, CERTIFIED REGISTERED

## 2024-06-27 PROCEDURE — 88305 TISSUE EXAM BY PATHOLOGIST: CPT | Mod: TC | Performed by: SURGERY

## 2024-06-27 RX ORDER — SODIUM CHLORIDE, SODIUM LACTATE, POTASSIUM CHLORIDE, CALCIUM CHLORIDE 600; 310; 30; 20 MG/100ML; MG/100ML; MG/100ML; MG/100ML
INJECTION, SOLUTION INTRAVENOUS CONTINUOUS
Status: DISCONTINUED | OUTPATIENT
Start: 2024-06-27 | End: 2024-06-27 | Stop reason: HOSPADM

## 2024-06-27 RX ORDER — SODIUM CHLORIDE, SODIUM LACTATE, POTASSIUM CHLORIDE, CALCIUM CHLORIDE 600; 310; 30; 20 MG/100ML; MG/100ML; MG/100ML; MG/100ML
INJECTION, SOLUTION INTRAVENOUS CONTINUOUS PRN
Status: DISCONTINUED | OUTPATIENT
Start: 2024-06-27 | End: 2024-06-27

## 2024-06-27 RX ORDER — LIDOCAINE 40 MG/G
CREAM TOPICAL
Status: DISCONTINUED | OUTPATIENT
Start: 2024-06-27 | End: 2024-06-27 | Stop reason: HOSPADM

## 2024-06-27 RX ORDER — PROPOFOL 10 MG/ML
INJECTION, EMULSION INTRAVENOUS PRN
Status: DISCONTINUED | OUTPATIENT
Start: 2024-06-27 | End: 2024-06-27

## 2024-06-27 RX ORDER — LIDOCAINE HYDROCHLORIDE 20 MG/ML
INJECTION, SOLUTION INFILTRATION; PERINEURAL PRN
Status: DISCONTINUED | OUTPATIENT
Start: 2024-06-27 | End: 2024-06-27

## 2024-06-27 RX ORDER — PROPOFOL 10 MG/ML
INJECTION, EMULSION INTRAVENOUS CONTINUOUS PRN
Status: DISCONTINUED | OUTPATIENT
Start: 2024-06-27 | End: 2024-06-27

## 2024-06-27 RX ORDER — ONDANSETRON 2 MG/ML
4 INJECTION INTRAMUSCULAR; INTRAVENOUS
Status: DISCONTINUED | OUTPATIENT
Start: 2024-06-27 | End: 2024-06-27 | Stop reason: HOSPADM

## 2024-06-27 RX ADMIN — PROPOFOL 50 MG: 10 INJECTION, EMULSION INTRAVENOUS at 11:17

## 2024-06-27 RX ADMIN — SODIUM CHLORIDE, POTASSIUM CHLORIDE, SODIUM LACTATE AND CALCIUM CHLORIDE: 600; 310; 30; 20 INJECTION, SOLUTION INTRAVENOUS at 10:58

## 2024-06-27 RX ADMIN — PROPOFOL 100 MCG/KG/MIN: 10 INJECTION, EMULSION INTRAVENOUS at 11:18

## 2024-06-27 RX ADMIN — LIDOCAINE HYDROCHLORIDE 50 MG: 20 INJECTION, SOLUTION INFILTRATION; PERINEURAL at 11:17

## 2024-06-27 RX ADMIN — PROPOFOL 40 MG: 10 INJECTION, EMULSION INTRAVENOUS at 11:19

## 2024-06-27 ASSESSMENT — ACTIVITIES OF DAILY LIVING (ADL)
ADLS_ACUITY_SCORE: 35
ADLS_ACUITY_SCORE: 35

## 2024-06-27 NOTE — ANESTHESIA CARE TRANSFER NOTE
Patient: Love Kaiser    Procedure: Procedure(s):  COLONOSCOPY, FLEXIBLE, WITH LESION REMOVAL USING SNARE       Diagnosis: Special screening for malignant neoplasms, colon [Z12.11]  Diagnosis Additional Information: No value filed.    Anesthesia Type:   MAC     Note:    Oropharynx: oropharynx clear of all foreign objects and spontaneously breathing  Level of Consciousness: drowsy  Oxygen Supplementation: room air    Independent Airway: airway patency satisfactory and stable  Dentition: dentition unchanged  Vital Signs Stable: post-procedure vital signs reviewed and stable  Report to RN Given: handoff report given  Patient transferred to: PACU    Handoff Report: Identifed the Patient, Identified the Reponsible Provider, Reviewed the pertinent medical history, Discussed the surgical course, Reviewed Intra-OP anesthesia mangement and issues during anesthesia, Set expectations for post-procedure period and Allowed opportunity for questions and acknowledgement of understanding      Vitals:  Vitals Value Taken Time   /77 06/27/24 1149   Temp     Pulse 66 06/27/24 1145   Resp     SpO2 99 % 06/27/24 1149   Vitals shown include unfiled device data.    Electronically Signed By: JEANNIE Monterroso CRNA  June 27, 2024  11:51 AM

## 2024-06-27 NOTE — LETTER
Love Kaiser  [address] [facility] [address]  July 3, 2024    Dear Love,  This letter is to inform you of the results of your pathology report from your colonoscopy.  Your pathology report was:  Final Diagnosis   A(1).   Colon, Sigmoid, polyp (15 mm), polypectomy:  -Fragments of tubular adenoma with high grade dysplasia  -Negative for deeply invasive malignancy.  B(2).   Colon, Sigmoid, polyp, polypectomy:  -Tubular adenoma  -Negative for high-grade dysplasia and malignancy.   These are benign polyps. These types of polyps do carry a small risk of developing into a cancer over time if not removed.  The larger polyp did have dysplasia which is a higher risk feature of a precancerous polyp.  Yours were completely removed at the time of your colonoscopy. You should have another surveillance colonoscopy in 3 years.  If you have further questions please don t hesitate to call our clinic at [phone].   Sincerely,     Isidoro Maria, DO                   Need for prophylactic measure

## 2024-06-27 NOTE — ANESTHESIA POSTPROCEDURE EVALUATION
Patient: Love Kaiser    Procedure: Procedure(s):  COLONOSCOPY, FLEXIBLE, WITH LESION REMOVAL USING SNARE       Anesthesia Type:  MAC    Note:  Disposition: Outpatient   Postop Pain Control: Uneventful            Sign Out: Well controlled pain   PONV: No   Neuro/Psych: Uneventful            Sign Out: Acceptable/Baseline neuro status   Airway/Respiratory: Uneventful            Sign Out: Acceptable/Baseline resp. status   CV/Hemodynamics: Uneventful            Sign Out: Acceptable CV status   Other NRE: NONE   DID A NON-ROUTINE EVENT OCCUR? No    Event details/Postop Comments:  Pt was happy with anesthesia care.  No complications.  I will follow up with the pt if needed.           Last vitals:  Vitals Value Taken Time   /84 06/27/24 1205   Temp     Pulse 69 06/27/24 1205   Resp 12 06/27/24 1205   SpO2 98 % 06/27/24 1205       Electronically Signed By: JEANNIE Monterroso CRNA  June 27, 2024  1:37 PM

## 2024-06-27 NOTE — H&P
Patient seen for Endoscopy    HPI:  Patient is a 74 year old female here for endoscopy. Not taking blood thinning medications. No MI or CVA history. No issues with previous sedation. No recent acute illness.    Review Of Systems    Skin: negative  Ears/Nose/Throat: negative  Respiratory: No shortness of breath, dyspnea on exertion, cough, or hemoptysis  Cardiovascular: negative  Gastrointestinal: negative  Genitourinary: negative  Musculoskeletal: negative  Neurologic: negative  Hematologic/Lymphatic/Immunologic: negative  Endocrine: negative      Past Medical History:   Diagnosis Date    Acute alcoholic hepatitis (H28)     Acute pancreatitis 08/07/06    aDMIT. dISCHARGED 08/10/06    Cancer of parotid gland (H) 1974    Chronic airway obstruction, not elsewhere classified     COPD (chronic obstructive pulmonary disease) (H) 3/30/2009    Diarrhea     Hyposmolality and/or hyponatremia     Macular degeneration 8/25/2016    Other and unspecified alcohol dependence, continuous drinking behavior     Other chronic nonalcoholic liver disease     Unspecified protein-calorie malnutrition (H24)        Past Surgical History:   Procedure Laterality Date    ARTHROPLASTY SHOULDER Right 5/26/2015    Procedure: ARTHROPLASTY SHOULDER;  Surgeon: Ashutosh Andrews DO;  [facility]    CL AFF SURGICAL PATHOLOGY  1974    parotid tumor with malignancy    COLONOSCOPY  09/21/09    COLONOSCOPY N/A 7/6/2016    Procedure: COMBINED COLONOSCOPY, SINGLE OR MULTIPLE BIOPSY/POLYPECTOMY BY BIOPSY;  Surgeon: John Bellamy MD;  [facility]    COLONOSCOPY N/A 7/24/2019    Procedure: COLONOSCOPY;  Surgeon: Kia Jean MD;  [facility]    EXCISE MASS FOOT  1/17/2012    Procedure:EXCISE MASS FOOT; Excision of Mass Right Foot; Surgeon:CARRIE PAGAN; Location:[facility]    FOREIGN BODY REMOVAL  07/08/10    Soft tissue mass w/peripheral metal fragments    HC UGI ENDOSCOPY DIAG W BIOPSY  11/22/06    HYSTERECTOMY, PAP NO  LONGER INDICATED      HYSTERECTOMY, FIDE      OPEN REDUCTION INTERNAL FIXATION WRIST Left 2/1/2019    Procedure: Left distal radius open reduction and internal fixation;  Surgeon: Ashutosh Andrews DO;  Location:  OR    Z REMOVAL OF OVARY(S)      ZZHC COLONOSCOPY W BIOPSY  11/22/06    ZZHC COLONOSCOPY W BIOPSY  10/31/07       Family History   Problem Relation Age of Onset    Arthritis Mother         RA    Cancer Mother         female organs    Cancer Father         colon       Social History     Socioeconomic History    Marital status: Single     Spouse name: Not on file    Number of children: Not on file    Years of education: Not on file    Highest education level: Not on file   Occupational History    Occupation: unemployed   Tobacco Use    Smoking status: Every Day     Current packs/day: 0.25     Average packs/day: 0.3 packs/day for 31.0 years (7.8 ttl pk-yrs)     Types: Cigarettes     Passive exposure: Current    Smokeless tobacco: Never   Vaping Use    Vaping status: Never Used   Substance and Sexual Activity    Alcohol use: No     Alcohol/week: 23.3 standard drinks of alcohol     Comment: quit drinking    Drug use: No    Sexual activity: Yes     Partners: Male     Birth control/protection: None     Comment: not currently   Other Topics Concern    Parent/sibling w/ CABG, MI or angioplasty before 65F 55M? No   Social History Narrative    Not on file     Social Determinants of Health     Financial Resource Strain: Low Risk  (1/11/2024)    Financial Resource Strain     Within the past 12 months, have you or your family members you live with been unable to get utilities (heat, electricity) when it was really needed?: No   Food Insecurity: Low Risk  (1/11/2024)    Food Insecurity     Within the past 12 months, did you worry that your food would run out before you got money to buy more?: No     Within the past 12 months, did the food you bought just not last and you didn t have money to get more?: No    Transportation Needs: Low Risk  (1/11/2024)    Transportation Needs     Within the past 12 months, has lack of transportation kept you from medical appointments, getting your medicines, non-medical meetings or appointments, work, or from getting things that you need?: No   Physical Activity: Not on file   Stress: Not on file   Social Connections: Unknown (12/19/2022)    [facility]    Social Connections     Frequency of Communication with Friends and Family: Not on file   Interpersonal Safety: Low Risk  (12/12/2023)    Interpersonal Safety     Do you feel physically and emotionally safe where you currently live?: Yes     Within the past 12 months, have you been hit, slapped, kicked or otherwise physically hurt by someone?: No     Within the past 12 months, have you been humiliated or emotionally abused in other ways by your partner or ex-partner?: No   Housing Stability: Low Risk  (1/11/2024)    Housing Stability     Do you have housing? : Yes     Are you worried about losing your housing?: No       No current outpatient medications on file.       Medications and history reviewed    Physical exam:  Vitals: BP (!) 162/81   Pulse 74   Temp 97.8  F (36.6  C) (Temporal)   Resp 16   SpO2 94%   BMI= There is no height or weight on file to calculate BMI.    Constitutional: Healthy, alert, non-distressed   Head: Normo-cephalic, atraumatic, no lesions, masses or tenderness   Cardiovascular: RRR, no new murmurs, +S1, +S2 heart sounds, no clicks, rubs or gallops   Respiratory: CTAB, no rales, rhonchi or wheezing, equal chest rise, good respiratory effort   Gastrointestinal: Soft, non-tender, non distended, no rebound rigidity or guarding, no masses or hernias palpated   : Deferred  Musculoskeletal: Moves all extremities, normal  strength, no deformities noted   Skin: No suspicious lesions or rashes   Psychiatric: Mentation  appears normal, affect appropriate   Hematologic/Lymphatic/Immunologic: Normal cervical and supraclavicular lymph nodes   Patient able to get up on table without difficulty.    Labs show:  No results found for this or any previous visit (from the past 24 hour(s)).    Assessment: Endoscopy  Plan: Pt cleared for anesthesia for proposed procedure.    Isidoro Maria, DO

## 2024-06-27 NOTE — DISCHARGE INSTRUCTIONS
[facility]    Home Care Following Endoscopy          Activity:  You have just undergone an endoscopic procedure under sedation.  Do not work or operate machinery (including a car) for at least 12 hours.      Diet:  Return to the diet you were on before your procedure but eat lightly for the first 12-24 hours.  Drink plenty of water.  Resume any regular medications unless otherwise advised by your physician.    If you had any biopsy or polyp removed please refrain from aspirin or aspirin products for 2 days.    Pain:  You may take Tylenol as needed for pain.  Expected Recovery:  You can expect some mild abdominal fullness and/or discomfort due to the air used to inflate your intestinal tract. I encourage you to walk and attempt to pass air as soon as possible.        Call Your Physician if You Have:    After Colonoscopy:  Worsening persisting abdominal pain which is worse with activity.  Fevers (>101 degrees F), chills or shakes.  Passage of continued blood with bowel movements.   Any questions or concerns about your recovery, please call [phone] or after hours [phone] Nurse Advice Line.    Follow-up Care:  If you had polyps/biopsy tissue sample(s) removed, the polyps/biopsy tissue sample(s) will be sent to pathology.  You should receive a letter in your My Chart with your results, within 1-2 weeks. If you do not participate in My Chart a physical letter will come in the mail in 2-3 weeks.  Please call if you have not received a notification of your results.

## 2024-06-28 ENCOUNTER — TELEPHONE (OUTPATIENT)
Dept: FAMILY MEDICINE | Facility: OTHER | Age: 74
End: 2024-06-28
Payer: COMMERCIAL

## 2024-06-28 NOTE — TELEPHONE ENCOUNTER
RN called and relayed provider message    Patient verbalized understanding and in agreement with plan of care.     Seble Garcia RN

## 2024-06-28 NOTE — TELEPHONE ENCOUNTER
----- Message from Irma Saez sent at 6/28/2024  5:58 AM CDT -----  Please call patient. Her colonoscopy showed two polyps and the provider recommended repeat in 3 years.     Irma Saez PA-C

## 2024-07-01 LAB
PATH REPORT.COMMENTS IMP SPEC: NORMAL
PATH REPORT.COMMENTS IMP SPEC: NORMAL
PATH REPORT.FINAL DX SPEC: NORMAL
PATH REPORT.GROSS SPEC: NORMAL
PATH REPORT.MICROSCOPIC SPEC OTHER STN: NORMAL
PATH REPORT.RELEVANT HX SPEC: NORMAL
PHOTO IMAGE: NORMAL

## 2024-10-18 ENCOUNTER — NURSE TRIAGE (OUTPATIENT)
Dept: FAMILY MEDICINE | Facility: OTHER | Age: 74
End: 2024-10-18
Payer: COMMERCIAL

## 2024-10-18 NOTE — TELEPHONE ENCOUNTER
Patient having lots of confusion, forgetting where she is, who her children are. She is audibly distressed.     She had similar episode a while back. She was not seen or evaluated.     She was unable to tell me how long this episode lasted, Writers Nursing Judgement advised patient to go to ED immediately. Patient is blind and can not drive. Patient struggled to provide Son (emergency contact) information. Writer attempted 3 times to contact patient son. Writer allowed patient to disconnect and call son and ask to bring to emergency room.     Writer called patient and patient verbalized her son is coming over to take her to ER. Verbalized that writer could not verify patient did as she stated she did, given current condition/background. Writer did contact [place] [id number] and is performing a welfare check on patient. Writer updated patient that welfare check is being performed and that they will sit with her until son arrives.     If anyone has questions they can return call to clinic. Writer felt in best interest of patient to not be left alone, until evaluation in ED. Patient verbalized understanding.     Robbi Christianson RN on 10/18/2024 at 4:40 PM          Reason for Disposition   Brief confusion (now gone)    Additional Information   Negative: Patient sounds very sick or weak to the triager    Protocols used: Confusion - Delirium-A-OH

## 2024-10-21 ENCOUNTER — TELEPHONE (OUTPATIENT)
Dept: FAMILY MEDICINE | Facility: OTHER | Age: 74
End: 2024-10-21

## 2024-10-21 NOTE — TELEPHONE ENCOUNTER
Reason for Call:  Appointment Request    Patient requesting this type of appt:  Hospital/ED Follow-Up (emergency room follow up for confusion )    Requested provider:  any provider    Reason patient unable to be scheduled: Needs to be scheduled by clinic    When does patient want to be seen/preferred time: 3-7 days    Comments: Patient is requesting an in office appointment, patient needs an appointment after 2:30 pm at [place], please call    Okay to leave a detailed message?: No at Home number on file [phone] (home)    Call taken on 10/21/2024 at 8:23 AM by Iain Negrete

## 2024-10-24 ENCOUNTER — TELEPHONE (OUTPATIENT)
Dept: FAMILY MEDICINE | Facility: OTHER | Age: 74
End: 2024-10-24
Payer: COMMERCIAL

## 2024-10-24 ENCOUNTER — TRANSFERRED RECORDS (OUTPATIENT)
Dept: HEALTH INFORMATION MANAGEMENT | Facility: CLINIC | Age: 74
End: 2024-10-24

## 2024-10-24 NOTE — TELEPHONE ENCOUNTER
Patient calling to follow-up from Friday Triage.     She has appointment to follow-up with neurology. She can not remember address.     Address to see Shira Sanders on 11/01 is:     [address] [place] [address]    She is struggling to write down as she is hard of sight. Can we please send address on printed form to her home address?     Robbi Christianson RN on 10/24/2024 at 2:44 PM

## 2024-10-24 NOTE — LETTER
Dear Nelia,      We are sending you this letter because we were informed that you wanted to be notified of the address for your next appointment with Neurology on 11/01/24, the address for that appointment is:  [address]. [place] [address].     Let us know if you have any other questions.      Thank you,  [facility] Team.

## 2024-11-01 ENCOUNTER — OFFICE VISIT (OUTPATIENT)
Dept: NEUROLOGY | Facility: CLINIC | Age: 74
End: 2024-11-01
Attending: PHYSICIAN ASSISTANT
Payer: COMMERCIAL

## 2024-11-01 VITALS
HEART RATE: 67 BPM | SYSTOLIC BLOOD PRESSURE: 173 MMHG | DIASTOLIC BLOOD PRESSURE: 82 MMHG | WEIGHT: 123 LBS | TEMPERATURE: 98.1 F | BODY MASS INDEX: 21.79 KG/M2 | HEIGHT: 63 IN | OXYGEN SATURATION: 98 %

## 2024-11-01 DIAGNOSIS — R41.0 CONFUSION: ICD-10-CM

## 2024-11-01 NOTE — LETTER
"11/1/2024       RE: Love Kaiser  [address] [facility] [address]     Dear Colleague,    Thank you for referring your patient, Love Kaiser, to the  [facility] at [facility]. Please see a copy of my visit note below.    Chief Complaint: memory problems    History of Present Illness:  Ms. Kaiser is a 74 year old right-handed female with history of COPD, hypertension, hyperlipidemia, legally blind due to macular degeneration, presenting for evaluation of memory problems. She is accompanied to today's visit by son Reji, who assists in providing the history.    Ms. Kaiser reports that she has confusional episodes for the past 3 weeks.    Sometimes she knows that one of these episodes is coming on.  She senses that \"someone is shutting things down.\"  She wants to call her son. She can call her son on the phone.  She cried all day.  When she is in the episode, she could not figure anything out.  Sometimes if she goes to bed, then it's better the next day.  However, sometime, she is still out of it the next day.  Her last one was two days ago.  She has 2-3 episodes/week.  The last episode is not as bad as previous.  She knows that she is in the episodes.  She does not remember things that occur during these episodes.  She thought that she was calling her son. During the episode, she thought that she had another child, and she had to get him money.  His name was Lamess.  She does not have hallucinations.  These episodes are typically a few hours.  After the episode is over, she feels tired.  She does not lose bowel or bladder control.  Her son has not witnessed any blank stare or mouth movements.  She does not have warm feeling rising up her stomach.  She does not have slurred speech, weakness or numbness.  She has never had seizures in her life before.  She did not get an EEG at the ED.  At the ED the neurologist and the " "emergency room physician did not think that she had a stroke.  She did not have headaches.  She does not have history of migraines.  She does not use no elicit drug, marijuana or any recent changes in medications.  No significant weight changes.  She had an EEG in her 20s or 30s at [facility].  She had alcoholism, which she quit in 2006.      Her  informant was interviewed and reports that she knew the date, year, why they were there, but she did not know her name and her son's name during one of the episodes.  Inbetween the confusional episodes, she is fine.  She lives alone.  She has macular degeneration.     Sleep: good, she does not know if she acts out her dreams. She feels tired after a night's sleep.      No falls.     iADLs:  Driving: She does not drive due to macular degeneration since age 58.   She is legally blind.  She uses a device to help her to write the check.   Finances: She manages her own finances.  Medications: She manages.  Medical appointment: She manages.  Meal preparations: She manages.    Her PCP is Ramon GREEN.  He last saw Nelia on 10/21/2024.   \"Patient was seen at the [facility] ED on 10/18 following an episode of confusion after doing yard work. She was taken to the ED by her son after she was unable to recognize him or recall the names of other family members. I do not have access to anything but the testing results. Fortunately, after reviewing these results, I seen that all testing returned negative. This includes negative CTA, CT head, US carotids and broad lab workup. I reviewed these with the patient and explained that there isn't any additional testing which is recommended at this time. \"    ED note on 10/18/2024  \"They describe, that intermittently, over the last 2 months, the patient has been experiencing episodes, what they described as confusion. The patient goes through periods, that are transient, lasting between a few, and 24 hours, where she will become very " "confused. She notes that she is having difficulty, recalling if she has a dog or a cat, during these episodes, difficulty finding her family members names, among others. Patient describes, that yesterday, she had 1 of these episodes. They called the nursing triage line, and she came in here for further evaluation. Currently, she is asymptomatic, the patient's family, notes she otherwise looks well at this time. They deny any other symptoms. \"        Patient Active Problem List   Diagnosis     Esophageal reflux     Slow transit constipation     Osteoporosis     COPD (chronic obstructive pulmonary disease) (H)     Atopic rhinitis     Hx of colonic polyp     Status post right shoulder hemiarthroplasty     Essential hypertension, benign     Prinzmetal angina (H)     Hyperlipidemia LDL goal <70     Intermediate stage nonexudative age-related macular degeneration of both eyes     Legally blind     Cervical disc syndrome     Alcohol dependence (H)       Past Medical History:   Diagnosis Date     Acute alcoholic hepatitis (H)      Acute pancreatitis 08/07/06    aDMIT. dISCHARGED 08/10/06     Cancer of parotid gland (H) 1974     Chronic airway obstruction, not elsewhere classified      COPD (chronic obstructive pulmonary disease) (H) 3/30/2009     Diarrhea      Hyposmolality and/or hyponatremia      Macular degeneration 8/25/2016     Other and unspecified alcohol dependence, continuous drinking behavior      Other chronic nonalcoholic liver disease      Unspecified protein-calorie malnutrition (H)        Past Surgical History:   Procedure Laterality Date     ARTHROPLASTY SHOULDER Right 5/26/2015    Procedure: ARTHROPLASTY SHOULDER;  Surgeon: Ashutosh Andrews DO;  Location: [facility] OR     CL AFF SURGICAL PATHOLOGY  1974    parotid tumor with malignancy     COLONOSCOPY  09/21/09     COLONOSCOPY N/A 7/6/2016    Procedure: COMBINED COLONOSCOPY, SINGLE OR MULTIPLE BIOPSY/POLYPECTOMY BY BIOPSY;  Surgeon: John Bellamy, " MD;  Location: [facility]     COLONOSCOPY N/A 7/24/2019    Procedure: COLONOSCOPY;  Surgeon: Kia Jean MD;  Location: [facility]     COLONOSCOPY N/A 6/27/2024    Procedure: COLONOSCOPY, FLEXIBLE, WITH LESION REMOVAL USING SNARE;  Surgeon: Isidoro Maria DO;  Location: [facility]     EXCISE MASS FOOT  1/17/2012    Procedure:EXCISE MASS FOOT; Excision of Mass Right Foot; Surgeon:CARRIE PAGAN; Location:[facility] OR     FOREIGN BODY REMOVAL  07/08/10    Soft tissue mass w/peripheral metal fragments     [facility] UGI ENDOSCOPY DIAG W BIOPSY  11/22/06     HYSTERECTOMY, PAP NO LONGER INDICATED       HYSTERECTOMY, FIDE       OPEN REDUCTION INTERNAL FIXATION WRIST Left 2/1/2019    Procedure: Left distal radius open reduction and internal fixation;  Surgeon: Ashutosh Andrews DO;  Location: [facility] OR     ZZC REMOVAL OF OVARY(S)       ZZHC COLONOSCOPY W BIOPSY  11/22/06     ZZHC COLONOSCOPY W BIOPSY  10/31/07       Current Outpatient Medications   Medication Sig Dispense Refill     albuterol (PROAIR HFA/PROVENTIL HFA/VENTOLIN HFA) 108 (90 Base) MCG/ACT inhaler INHALE ONE OR TWO PUFFS BY MOUTH EVERY FOUR HOURS AS NEEDED 18 g 3     aspirin 81 MG tablet Take 1 tablet (81 mg) by mouth daily 90 tablet 3     atorvastatin (LIPITOR) 40 MG tablet Take 40 mg by mouth daily       Atorvastatin Calcium (LIPITOR PO) Take 40 mg by mouth daily       beclomethasone HFA (QVAR REDIHALER) 40 MCG/ACT inhaler Inhale 1 puff into the lungs 2 times daily (Patient not taking: Reported on 1/11/2024) 10.6 g 5     CARTIA  MG 24 hr capsule Take 180 mg by mouth daily  8     ketoconazole (NIZORAL) 2 % external cream Apply topically 2 times daily 60 g 0     nicotine (NICODERM CQ) 14 MG/24HR 24 hr patch Place 1 patch onto the skin every 24 hours 30 patch 1     nitroGLYcerin (NITROSTAT) 0.4 MG sublingual tablet Place 0.4 mg under the tongue every 5 minutes as needed for chest pain       POTASSIUM 75 MG OR TABS 1 TABLET  DAILY       VITAMIN B-12 500 MCG OR  TABS daily       VITAMIN C 500 MG OR TABS ONE TABLET DAILY 90 Tab 3     VITAMIN D-3 SUPER STRENGTH 2000 UNIT OR TABS daily          Allergies   Allergen Reactions     No Known Drug Allergy      Nickel Itching, Rash and Swelling       Family History   Problem Relation Age of Onset     Arthritis Mother         RA     Cancer Mother         female organs     Cancer Father         colon         Social History     Socioeconomic History     Marital status: Single     Spouse name: Not on file     Number of children: Not on file     Years of education: Not on file     Highest education level: Not on file   Occupational History     Occupation: unemployed   Tobacco Use     Smoking status: Every Day     Current packs/day: 0.25     Average packs/day: 0.3 packs/day for 31.0 years (7.8 ttl pk-yrs)     Types: Cigarettes     Passive exposure: Current     Smokeless tobacco: Never   Vaping Use     Vaping status: Never Used   Substance and Sexual Activity     Alcohol use: No     Alcohol/week: 23.3 standard drinks of alcohol     Comment: quit drinking     Drug use: No     Sexual activity: Yes     Partners: Male     Birth control/protection: None     Comment: not currently   Other Topics Concern     Parent/sibling w/ CABG, MI or angioplasty before 65F 55M? No   Social History Narrative     Not on file     Social Drivers of Health     Financial Resource Strain: Low Risk  (1/11/2024)    Financial Resource Strain      Within the past 12 months, have you or your family members you live with been unable to get utilities (heat, electricity) when it was really needed?: No   Food Insecurity: Low Risk  (1/11/2024)    Food Insecurity      Within the past 12 months, did you worry that your food would run out before you got money to buy more?: No      Within the past 12 months, did the food you bought just not last and you didn t have money to get more?: No   Transportation Needs: Low Risk  (1/11/2024)    Transportation Needs      Within the past 12  "months, has lack of transportation kept you from medical appointments, getting your medicines, non-medical meetings or appointments, work, or from getting things that you need?: No   Physical Activity: Not on file   Stress: Not on file   Social Connections: Unknown (12/19/2022)    [facility], [facility]    Social Connections      Frequency of Communication with Friends and Family: Not on file   Interpersonal Safety: Low Risk  (12/12/2023)    Interpersonal Safety      Do you feel physically and emotionally safe where you currently live?: Yes      Within the past 12 months, have you been hit, slapped, kicked or otherwise physically hurt by someone?: No      Within the past 12 months, have you been humiliated or emotionally abused in other ways by your partner or ex-partner?: No   Housing Stability: Low Risk  (1/11/2024)    Housing Stability      Do you have housing? : Yes      Are you worried about losing your housing?: No     FHX:  No dementia    SHX:  She finished high school.  She worked in a factory.      General Physical examination:  BP (!) 173/82   Pulse 67   Temp 98.1  F (36.7  C) (Temporal)   Ht 1.6 m (5' 3\")   Wt 55.8 kg (123 lb)   SpO2 98%   BMI 21.79 kg/m       General: Ms. Kaiser is well appearing and is appropriately groomed and dressed.    Neurological examination:    Mental status: Ms. Kaiser  is awake, alert, and appropriate, with fluent speech, no word finding difficulties and no difficulty in answering questions.   Cranial nerves:  She has peripheral vision.  Extraocular movements are intact. Smooth pursuit is intact. Saccades are normal in initiation, velocity and amplitude both vertically and horizontally.  Facial strength is full bilaterally.  Sternocledomastoid and trapezius muscle strength is full bilaterally.  Motor examination: Axial and upper and lower extremity tone is normal. No pronator drift is " noted. Strength is 5/5 and symmetric throughout. There is no tremor or other involuntary movement.  Coordination: Rapid finger tapping, opening and closing of fist and pronation-supination are not slowed.     MMSE:  Orientation to time 5/5  Orientation to place 3/5  Registration 2/3  Attention (world backwards) 5/5  Recall 3/3  Naming 2/2  Repetition 0/1  3-step command 3/3  Total 23/27      Neuropsychological evaluation:  N/a    Labs:  Lab Results   Component Value Date    WBC 6.9 11/29/2022    RBC 4.75 11/29/2022    HGB 14.7 11/29/2022    HCT 42.5 11/29/2022    MCV 90 11/29/2022    MCH 30.9 11/29/2022    MCHC 34.6 11/29/2022    RDW 12.1 11/29/2022     11/29/2022     12/12/2023    POTASSIUM 4.0 12/12/2023    CHLORIDE 101 12/12/2023    CO2 26 12/12/2023    ANIONGAP 12 12/12/2023     (H) 12/12/2023    BUN 5.8 (L) 12/12/2023    CR 0.80 12/12/2023    SHAWNA 9.6 12/12/2023    TSH 3.79 01/29/2015    B12 419 08/08/2006        Imaging:  Results for orders placed or performed during the hospital encounter of 12/18/23   CT Chest Lung Cancer Scrn Low Dose wo    Narrative    CT CHEST LUNG CANCER SCREEN LOW DOSE WITHOUT  12/18/2023 9:07 AM    HISTORY:  Personal history of tobacco use    TECHNIQUE:  Scans obtained from the apices through the diaphragm  without IV contrast. Low dose CT chest technique was utilized.  Radiation dose for this scan was reduced using automated exposure  control, adjustment of the mA and/or kV according to patient size, or  iterative reconstruction technique.    COMPARISON:  12/5/2022    FINDINGS:  Lungs: A few small calcified granuloma are stable. No new or enlarging  nodules. The central tracheobronchial tree is clear. Mild  centrilobular emphysema. No bronchiectasis or pulmonary fibrosis. No  infiltrate or pleural effusion.    Additional findings: No lymphadenopathy. No thoracic aortic aneurysm.  Severe coronary artery calcifications. Trace pericardial fluid.  Visualized portions of  "the upper abdomen are within normal limits. The  right shoulder arthroplasty. No suspicious lesions in the bones.      Impression    IMPRESSION:   1. ACR Assessment Category:  Lung-RADS Category 1. Negative, continue  annual screening, if clinically relevant (please order exam code IMG  2290). .   2. Significant Incidental Finding(s):  Category S: Yes. Coronary  artery calcium moderate or severe      Download the \"LungRADS Assessment Categories\" table at this site:   http://www.acr.org/Quality-Safety/Resources/LungRADS    GINGER ESQUIVEL MD         SYSTEM ID:  [id number]           CTA HEAD AND NECK     Result Date: 10/18/2024  EXAM:  CTA HEAD AND NECK DATE:  10/18/2024 8:55 PM CLINICAL DATA:  TIA like symptoms   COMPARISON:  None. TECHNIQUE:  A CT angiogram (CTA) of the carotid arteries was obtained.  Specifically, during bolus infusion of intravenous contrast, thin-section contiguous transaxial images were obtained  through the neck and [place] during the arterial phase of enhancement. Coronal and sagittal images were obtained. Data was also manipulated on an independent workstation by the Radiologist and 3D images were created and reviewed. *NOTE: Following NASCET criteria, carotid stenosis measurements are calculated using the distal  internal carotid artery in the neck as the denominator. CONTRAST: 100 cc intravenous injection of nonionic contrast. FINDINGS: Analyzed aorta is normal in caliber. Moderate aortic calcifications. Three-vessel aortic branching. Patent great vessel origins. Bilateral common and internal carotid arteries are patent. Retropharyngeal course of the common  carotid arteries. Moderate calcifications of the carotid bifurcations. No significant stenosis  of the proximal right internal carotid artery. Approximately 25-30% stenosis of the proximal left internal carotid artery. Bilateral cerebral arteries are patent and normal in caliber. Left dominant vertebral artery. Mild vertebral " calcifications. Visualized intracranial arteries are patent and normal in caliber. Moderate calcifications of the carotid siphons. Intracranial aneurysm. Multilevel degenerative changes in the spine. No significant spinal canal narrowing at any level. No acute osseous abnormality. Mild/moderate emphysema in the partial image lung apices. Calcified granulomas. IMPRESSION:  1.  No occlusion or significant stenosis of the major cervical or intracranial arteries. No intracranial aneurysm. 2.  Mild to moderate atherosclerotic disease, greatest at the carotid bifurcations with approximately 25% stenosis of the proximal left internal carotid artery. 3.  Emphysema. Electronically Signed By: Qamar Rosales on 10/18/2024 10:20 PM     CT HEAD ROUTINE WITHOUT IV CONTRAST     Result Date: 10/18/2024  EXAM:  CT HEAD ROUTINE WITHOUT IV CONTRAST DATE:  10/18/2024 8:50 PM CLINICAL DATA:  confusion  COMPARISON:  None. TECHNIQUE: Noncontrast CT scan of the head performed. Multiplanar reformations provided. FINDINGS: No intracranial hemorrhage, mass effect, midline shift, or abnormal extra axial fluid collection. No hydrocephalus. Mild generalized volume loss. Gray-white matter differentiation is preserved. Mild subcortical and periventricular matter hypoattenuation. Calvarium and visualized facial bones are intact. Presumed hemangioma in the right frontal calvarium. Visualized paranasal sinuses and mastoid air cells are clear. IMPRESSION:  1.  No acute intracranial abnormality. 2.  Mild generalized volume loss and sequelae of chronic small vessel ischemic disease. Electronically Signed By: Qamar Rosales on 10/18/2024 10:16 PM     Impression:  Ms. Kaiser is a 74 year old right-handed female with history of COPD, hypertension, hyperlipidemia, who presents with memory problems.  She has episodic confusional events, during which she does not have fully recollection of what happens.  It is not clear if there is a semiology.  She  possibly has TGA.  The differential includes seizure, migraines, and possible TIA.  She does not have headaches or history of migraines; therefore, migraines are less likely.  Lewy body dementia is possible; however, with no parkinsonian feature, it's less likely.     Recommendations:  1. MRI brain with and without contrast  2. EEG with sleep deprivation    Follow-up: Return in about 2 months.    I spent 75 minutes total today for this visit, which includes face-to-face with the patient, reviewing the chart (neuropsychological testing, MRI images, lab reports, clinical notes, medications), ordering diagnostic tests, counseling, and documentation.       The longitudinal plan of care for confusional episodes as documented were addressed during this visit. Due to the added complexity in care, I will continue to support Nelia in the subsequent management and with ongoing continuity of care.       Again, thank you for allowing me to participate in the care of your patient.      Sincerely,    Shira Sanders MD

## 2024-11-01 NOTE — PROGRESS NOTES
"Chief Complaint: memory problems    History of Present Illness:  Ms. Kaiser is a 74 year old right-handed female with history of COPD, hypertension, hyperlipidemia, legally blind due to macular degeneration, presenting for evaluation of memory problems. She is accompanied to today's visit by son Reji, who assists in providing the history.    Ms. Kaiser reports that she has confusional episodes for the past 3 weeks.    Sometimes she knows that one of these episodes is coming on.  She senses that \"someone is shutting things down.\"  She wants to call her son. She can call her son on the phone.  She cried all day.  When she is in the episode, she could not figure anything out.  Sometimes if she goes to bed, then it's better the next day.  However, sometime, she is still out of it the next day.  Her last one was two days ago.  She has 2-3 episodes/week.  The last episode is not as bad as previous.  She knows that she is in the episodes.  She does not remember things that occur during these episodes.  She thought that she was calling her son. During the episode, she thought that she had another child, and she had to get him money.  His name was Lamess.  She does not have hallucinations.  These episodes are typically a few hours.  After the episode is over, she feels tired.  She does not lose bowel or bladder control.  Her son has not witnessed any blank stare or mouth movements.  She does not have warm feeling rising up her stomach.  She does not have slurred speech, weakness or numbness.  She has never had seizures in her life before.  She did not get an EEG at the ED.  At the ED the neurologist and the emergency room physician did not think that she had a stroke.  She did not have headaches.  She does not have history of migraines.  She does not use no elicit drug, marijuana or any recent changes in medications.  No significant weight changes.  She had an EEG in her 20s or 30s at [facility].  She had " "alcoholism, which she quit in 2006.      Her  informant was interviewed and reports that she knew the date, year, why they were there, but she did not know her name and her son's name during one of the episodes.  Inbetween the confusional episodes, she is fine.  She lives alone.  She has macular degeneration.     Sleep: good, she does not know if she acts out her dreams. She feels tired after a night's sleep.      No falls.     iADLs:  Driving: She does not drive due to macular degeneration since age 58.   She is legally blind.  She uses a device to help her to write the check.   Finances: She manages her own finances.  Medications: She manages.  Medical appointment: She manages.  Meal preparations: She manages.    Her PCP is Ramon GREEN.  He last saw Nelia on 10/21/2024.   \"Patient was seen at the [place] ED on 10/18 following an episode of confusion after doing yard work. She was taken to the ED by her son after she was unable to recognize him or recall the names of other family members. I do not have access to anything but the testing results. Fortunately, after reviewing these results, I seen that all testing returned negative. This includes negative CTA, CT head, US carotids and broad lab workup. I reviewed these with the patient and explained that there isn't any additional testing which is recommended at this time. \"    ED note on 10/18/2024  \"They describe, that intermittently, over the last 2 months, the patient has been experiencing episodes, what they described as confusion. The patient goes through periods, that are transient, lasting between a few, and 24 hours, where she will become very confused. She notes that she is having difficulty, recalling if she has a dog or a cat, during these episodes, difficulty finding her family members names, among others. Patient describes, that yesterday, she had 1 of these episodes. They called the nursing triage line, and she came in here for further " "evaluation. Currently, she is asymptomatic, the patient's family, notes she otherwise looks well at this time. They deny any other symptoms. \"        Patient Active Problem List   Diagnosis    Esophageal reflux    Slow transit constipation    Osteoporosis    COPD (chronic obstructive pulmonary disease) (H)    Atopic rhinitis    Hx of colonic polyp    Status post right shoulder hemiarthroplasty    Essential hypertension, benign    Prinzmetal angina (H)    Hyperlipidemia LDL goal <70    Intermediate stage nonexudative age-related macular degeneration of both eyes    Legally blind    Cervical disc syndrome    Alcohol dependence (H)       Past Medical History:   Diagnosis Date    Acute alcoholic hepatitis (H)     Acute pancreatitis 08/07/06    aDMIT. dISCHARGED 08/10/06    Cancer of parotid gland (H) 1974    Chronic airway obstruction, not elsewhere classified     COPD (chronic obstructive pulmonary disease) (H) 3/30/2009    Diarrhea     Hyposmolality and/or hyponatremia     Macular degeneration 8/25/2016    Other and unspecified alcohol dependence, continuous drinking behavior     Other chronic nonalcoholic liver disease     Unspecified protein-calorie malnutrition (H)        Past Surgical History:   Procedure Laterality Date    ARTHROPLASTY SHOULDER Right 5/26/2015    Procedure: ARTHROPLASTY SHOULDER;  Surgeon: Ashutosh Andrews DO;  Location:  OR     AFF SURGICAL PATHOLOGY  1974    parotid tumor with malignancy    COLONOSCOPY  09/21/09    COLONOSCOPY N/A 7/6/2016    Procedure: COMBINED COLONOSCOPY, SINGLE OR MULTIPLE BIOPSY/POLYPECTOMY BY BIOPSY;  Surgeon: John Bellamy MD;  Location:  GI    COLONOSCOPY N/A 7/24/2019    Procedure: COLONOSCOPY;  Surgeon: Kia Jean MD;  Location:  GI    COLONOSCOPY N/A 6/27/2024    Procedure: COLONOSCOPY, FLEXIBLE, WITH LESION REMOVAL USING SNARE;  Surgeon: Isidoro Maria DO;  Location:  GI    EXCISE MASS FOOT  1/17/2012    Procedure:EXCISE MASS " FOOT; Excision of Mass Right Foot; Surgeon:CARRIE PAGAN; Location:[facility] OR    FOREIGN BODY REMOVAL  07/08/10    Soft tissue mass w/peripheral metal fragments    [facility] UGI ENDOSCOPY DIAG W BIOPSY  11/22/06    HYSTERECTOMY, PAP NO LONGER INDICATED      HYSTERECTOMY, FIDE      OPEN REDUCTION INTERNAL FIXATION WRIST Left 2/1/2019    Procedure: Left distal radius open reduction and internal fixation;  Surgeon: Ashutosh Andrews DO;  Location: [facility] OR    ZZC REMOVAL OF OVARY(S)      ZZHC COLONOSCOPY W BIOPSY  11/22/06    ZZHC COLONOSCOPY W BIOPSY  10/31/07       Current Outpatient Medications   Medication Sig Dispense Refill    albuterol (PROAIR HFA/PROVENTIL HFA/VENTOLIN HFA) 108 (90 Base) MCG/ACT inhaler INHALE ONE OR TWO PUFFS BY MOUTH EVERY FOUR HOURS AS NEEDED 18 g 3    aspirin 81 MG tablet Take 1 tablet (81 mg) by mouth daily 90 tablet 3    atorvastatin (LIPITOR) 40 MG tablet Take 40 mg by mouth daily      Atorvastatin Calcium (LIPITOR PO) Take 40 mg by mouth daily      beclomethasone HFA (QVAR REDIHALER) 40 MCG/ACT inhaler Inhale 1 puff into the lungs 2 times daily (Patient not taking: Reported on 1/11/2024) 10.6 g 5    CARTIA  MG 24 hr capsule Take 180 mg by mouth daily  8    ketoconazole (NIZORAL) 2 % external cream Apply topically 2 times daily 60 g 0    nicotine (NICODERM CQ) 14 MG/24HR 24 hr patch Place 1 patch onto the skin every 24 hours 30 patch 1    nitroGLYcerin (NITROSTAT) 0.4 MG sublingual tablet Place 0.4 mg under the tongue every 5 minutes as needed for chest pain      POTASSIUM 75 MG OR TABS 1 TABLET  DAILY      VITAMIN B-12 500 MCG OR TABS daily      VITAMIN C 500 MG OR TABS ONE TABLET DAILY 90 Tab 3    VITAMIN D-3 SUPER STRENGTH 2000 UNIT OR TABS daily          Allergies   Allergen Reactions    No Known Drug Allergy     Nickel Itching, Rash and Swelling       Family History   Problem Relation Age of Onset    Arthritis Mother         RA    Cancer Mother         female organs     Cancer Father         colon         Social History     Socioeconomic History    Marital status: Single     Spouse name: Not on file    Number of children: Not on file    Years of education: Not on file    Highest education level: Not on file   Occupational History    Occupation: unemployed   Tobacco Use    Smoking status: Every Day     Current packs/day: 0.25     Average packs/day: 0.3 packs/day for 31.0 years (7.8 ttl pk-yrs)     Types: Cigarettes     Passive exposure: Current    Smokeless tobacco: Never   Vaping Use    Vaping status: Never Used   Substance and Sexual Activity    Alcohol use: No     Alcohol/week: 23.3 standard drinks of alcohol     Comment: quit drinking    Drug use: No    Sexual activity: Yes     Partners: Male     Birth control/protection: None     Comment: not currently   Other Topics Concern    Parent/sibling w/ CABG, MI or angioplasty before 65F 55M? No   Social History Narrative    Not on file     Social Drivers of Health     Financial Resource Strain: Low Risk  (1/11/2024)    Financial Resource Strain     Within the past 12 months, have you or your family members you live with been unable to get utilities (heat, electricity) when it was really needed?: No   Food Insecurity: Low Risk  (1/11/2024)    Food Insecurity     Within the past 12 months, did you worry that your food would run out before you got money to buy more?: No     Within the past 12 months, did the food you bought just not last and you didn t have money to get more?: No   Transportation Needs: Low Risk  (1/11/2024)    Transportation Needs     Within the past 12 months, has lack of transportation kept you from medical appointments, getting your medicines, non-medical meetings or appointments, work, or from getting things that you need?: No   Physical Activity: Not on file   Stress: Not on file   Social Connections: Unknown (12/19/2022)    [facility], [facility]    Social Connections     Frequency of Communication with Friends and Family: Not on file   Interpersonal Safety: Low Risk  (12/12/2023)    Interpersonal Safety     Do you feel physically and emotionally safe where you currently live?: Yes     Within the past 12 months, have you been hit, slapped, kicked or otherwise physically hurt by someone?: No     Within the past 12 months, have you been humiliated or emotionally abused in other ways by your partner or ex-partner?: No   Housing Stability: Low Risk  (1/11/2024)    Housing Stability     Do you have housing? : Yes     Are you worried about losing your housing?: No     FHX:  No dementia    SHX:  She finished high school.  She worked in a factory.      General Physical examination:  BP (!) 173/82   Pulse 67   Temp 98.1  F (36.7  C) (Temporal)   Ht 1.6 m (5' 3\")   Wt 55.8 kg (123 lb)   SpO2 98%   BMI 21.79 kg/m       General: Ms. Kaiser is well appearing and is appropriately groomed and dressed.    Neurological examination:    Mental status: Ms. Kaiser  is awake, alert, and appropriate, with fluent speech, no word finding difficulties and no difficulty in answering questions.   Cranial nerves:  She has peripheral vision.  Extraocular movements are intact. Smooth pursuit is intact. Saccades are normal in initiation, velocity and amplitude both vertically and horizontally.  Facial strength is full bilaterally.  Sternocledomastoid and trapezius muscle strength is full bilaterally.  Motor examination: Axial and upper and lower extremity tone is normal. No pronator drift is noted. Strength is 5/5 and symmetric throughout. There is no tremor or other involuntary movement.  Coordination: Rapid finger tapping, opening and closing of fist and pronation-supination are not slowed.     MMSE:  Orientation to time 5/5  Orientation to place 3/5  Registration 2/3  Attention (world backwards) 5/5  Recall 3/3  Naming 2/2  Repetition 0/1  3-step command " 3/3  Total 23/27      Neuropsychological evaluation:  N/a    Labs:  Lab Results   Component Value Date    WBC 6.9 11/29/2022    RBC 4.75 11/29/2022    HGB 14.7 11/29/2022    HCT 42.5 11/29/2022    MCV 90 11/29/2022    MCH 30.9 11/29/2022    MCHC 34.6 11/29/2022    RDW 12.1 11/29/2022     11/29/2022     12/12/2023    POTASSIUM 4.0 12/12/2023    CHLORIDE 101 12/12/2023    CO2 26 12/12/2023    ANIONGAP 12 12/12/2023     (H) 12/12/2023    BUN 5.8 (L) 12/12/2023    CR 0.80 12/12/2023    SHAWNA 9.6 12/12/2023    TSH 3.79 01/29/2015    B12 419 08/08/2006        Imaging:  Results for orders placed or performed during the hospital encounter of 12/18/23   CT Chest Lung Cancer Scrn Low Dose wo    Narrative    CT CHEST LUNG CANCER SCREEN LOW DOSE WITHOUT  12/18/2023 9:07 AM    HISTORY:  Personal history of tobacco use    TECHNIQUE:  Scans obtained from the apices through the diaphragm  without IV contrast. Low dose CT chest technique was utilized.  Radiation dose for this scan was reduced using automated exposure  control, adjustment of the mA and/or kV according to patient size, or  iterative reconstruction technique.    COMPARISON:  12/5/2022    FINDINGS:  Lungs: A few small calcified granuloma are stable. No new or enlarging  nodules. The central tracheobronchial tree is clear. Mild  centrilobular emphysema. No bronchiectasis or pulmonary fibrosis. No  infiltrate or pleural effusion.    Additional findings: No lymphadenopathy. No thoracic aortic aneurysm.  Severe coronary artery calcifications. Trace pericardial fluid.  Visualized portions of the upper abdomen are within normal limits. The  right shoulder arthroplasty. No suspicious lesions in the bones.      Impression    IMPRESSION:   1. ACR Assessment Category:  Lung-RADS Category 1. Negative, continue  annual screening, if clinically relevant (please order exam code IMG  2290). .   2. Significant Incidental Finding(s):  Category S: Yes. Coronary  artery  "calcium moderate or severe      Download the \"LungRADS Assessment Categories\" table at this site:   http://www.acr.org/Quality-Safety/Resources/LungRADS    GINGER ESQUIVEL MD         SYSTEM ID:  [id number]           CTA HEAD AND NECK     Result Date: 10/18/2024  EXAM:  CTA HEAD AND NECK DATE:  10/18/2024 8:55 PM CLINICAL DATA:  TIA like symptoms   COMPARISON:  None. TECHNIQUE:  A CT angiogram (CTA) of the carotid arteries was obtained.  Specifically, during bolus infusion of intravenous contrast, thin-section contiguous transaxial images were obtained  through the neck and [place] during the arterial phase of enhancement. Coronal and sagittal images were obtained. Data was also manipulated on an independent workstation by the Radiologist and 3D images were created and reviewed. *NOTE: Following NASCET criteria, carotid stenosis measurements are calculated using the distal  internal carotid artery in the neck as the denominator. CONTRAST: 100 cc intravenous injection of nonionic contrast. FINDINGS: Analyzed aorta is normal in caliber. Moderate aortic calcifications. Three-vessel aortic branching. Patent great vessel origins. Bilateral common and internal carotid arteries are patent. Retropharyngeal course of the common  carotid arteries. Moderate calcifications of the carotid bifurcations. No significant stenosis  of the proximal right internal carotid artery. Approximately 25-30% stenosis of the proximal left internal carotid artery. Bilateral cerebral arteries are patent and normal in caliber. Left dominant vertebral artery. Mild vertebral calcifications. Visualized intracranial arteries are patent and normal in caliber. Moderate calcifications of the carotid siphons. Intracranial aneurysm. Multilevel degenerative changes in the spine. No significant spinal canal narrowing at any level. No acute osseous abnormality. Mild/moderate emphysema in the partial image lung apices. Calcified granulomas. IMPRESSION:  " 1.  No occlusion or significant stenosis of the major cervical or intracranial arteries. No intracranial aneurysm. 2.  Mild to moderate atherosclerotic disease, greatest at the carotid bifurcations with approximately 25% stenosis of the proximal left internal carotid artery. 3.  Emphysema. Electronically Signed By: Qamar Rosales on 10/18/2024 10:20 PM     CT HEAD ROUTINE WITHOUT IV CONTRAST     Result Date: 10/18/2024  EXAM:  CT HEAD ROUTINE WITHOUT IV CONTRAST DATE:  10/18/2024 8:50 PM CLINICAL DATA:  confusion  COMPARISON:  None. TECHNIQUE: Noncontrast CT scan of the head performed. Multiplanar reformations provided. FINDINGS: No intracranial hemorrhage, mass effect, midline shift, or abnormal extra axial fluid collection. No hydrocephalus. Mild generalized volume loss. Gray-white matter differentiation is preserved. Mild subcortical and periventricular matter hypoattenuation. Calvarium and visualized facial bones are intact. Presumed hemangioma in the right frontal calvarium. Visualized paranasal sinuses and mastoid air cells are clear. IMPRESSION:  1.  No acute intracranial abnormality. 2.  Mild generalized volume loss and sequelae of chronic small vessel ischemic disease. Electronically Signed By: Qamar Rosales on 10/18/2024 10:16 PM     Impression:  Ms. Kaiser is a 74 year old right-handed female with history of COPD, hypertension, hyperlipidemia, who presents with memory problems.  She has episodic confusional events, during which she does not have fully recollection of what happens.  It is not clear if there is a semiology.  She possibly has TGA.  The differential includes seizure, migraines, and possible TIA.  She does not have headaches or history of migraines; therefore, migraines are less likely.  Lewy body dementia is possible; however, with no parkinsonian feature, it's less likely.     Recommendations:  1. MRI brain with and without contrast  2. EEG with sleep deprivation    Follow-up: Return  in about 2 months.    I spent 75 minutes total today for this visit, which includes face-to-face with the patient, reviewing the chart (neuropsychological testing, MRI images, lab reports, clinical notes, medications), ordering diagnostic tests, counseling, and documentation.       The longitudinal plan of care for confusional episodes as documented were addressed during this visit. Due to the added complexity in care, I will continue to support Nelia in the subsequent management and with ongoing continuity of care.

## 2024-11-04 ENCOUNTER — TELEPHONE (OUTPATIENT)
Dept: FAMILY MEDICINE | Facility: OTHER | Age: 74
End: 2024-11-04
Payer: COMMERCIAL

## 2024-11-12 ENCOUNTER — PATIENT OUTREACH (OUTPATIENT)
Dept: CARE COORDINATION | Facility: CLINIC | Age: 74
End: 2024-11-12
Payer: COMMERCIAL

## 2024-11-22 ENCOUNTER — HOSPITAL ENCOUNTER (OUTPATIENT)
Dept: MRI IMAGING | Facility: CLINIC | Age: 74
Discharge: HOME | End: 2024-11-22
Attending: PSYCHIATRY & NEUROLOGY | Admitting: PSYCHIATRY & NEUROLOGY
Payer: COMMERCIAL

## 2024-11-22 PROCEDURE — 70553 MRI BRAIN STEM W/O & W/DYE: CPT

## 2024-11-22 PROCEDURE — 255N000002 HC RX 255 OP 636: Performed by: PSYCHIATRY & NEUROLOGY

## 2024-11-22 PROCEDURE — A9585 GADOBUTROL INJECTION: HCPCS | Performed by: PSYCHIATRY & NEUROLOGY

## 2024-11-22 RX ORDER — GADOBUTROL 604.72 MG/ML
5.5 INJECTION INTRAVENOUS ONCE
Status: COMPLETED | OUTPATIENT
Start: 2024-11-22 | End: 2024-11-22

## 2024-11-22 RX ADMIN — GADOBUTROL 5.5 ML: 604.72 INJECTION INTRAVENOUS at 16:26

## 2024-11-26 ENCOUNTER — PATIENT OUTREACH (OUTPATIENT)
Dept: CARE COORDINATION | Facility: CLINIC | Age: 74
End: 2024-11-26
Payer: COMMERCIAL

## 2024-11-26 ENCOUNTER — TELEPHONE (OUTPATIENT)
Dept: FAMILY MEDICINE | Facility: OTHER | Age: 74
End: 2024-11-26
Payer: COMMERCIAL

## 2024-12-10 DIAGNOSIS — J41.0 SIMPLE CHRONIC BRONCHITIS (H): ICD-10-CM

## 2024-12-10 RX ORDER — ALBUTEROL SULFATE 90 UG/1
INHALANT RESPIRATORY (INHALATION)
Qty: 8.5 G | Refills: 0 | Status: SHIPPED | OUTPATIENT
Start: 2024-12-10

## 2024-12-20 ENCOUNTER — TELEPHONE (OUTPATIENT)
Dept: NEUROLOGY | Facility: CLINIC | Age: 74
End: 2024-12-20

## 2024-12-20 DIAGNOSIS — R41.0 CONFUSION: Primary | ICD-10-CM

## 2024-12-20 NOTE — TELEPHONE ENCOUNTER
Patient's son is needing to schedule an EEG Sleep Deprived. There was a referral placed by Dr. aSnders however the status of the order is set to already completed.  This is not accurate because patient has not been scheduled for the EEG yet.  The MRI was completed but not the EEG order.  Please have Dr. Sanders reorder the EEG Sleep Deprived into active request.  Also, please reach out to the patient when new order is placed at [phone].

## 2025-01-28 ENCOUNTER — OFFICE VISIT (OUTPATIENT)
Dept: FAMILY MEDICINE | Facility: OTHER | Age: 75
End: 2025-01-28
Payer: COMMERCIAL

## 2025-01-28 VITALS
WEIGHT: 123 LBS | RESPIRATION RATE: 15 BRPM | HEIGHT: 62 IN | HEART RATE: 65 BPM | SYSTOLIC BLOOD PRESSURE: 131 MMHG | TEMPERATURE: 98.3 F | DIASTOLIC BLOOD PRESSURE: 69 MMHG | OXYGEN SATURATION: 98 % | BODY MASS INDEX: 22.63 KG/M2

## 2025-01-28 DIAGNOSIS — Z00.00 ENCOUNTER FOR MEDICARE ANNUAL WELLNESS EXAM: Primary | ICD-10-CM

## 2025-01-28 DIAGNOSIS — F10.21 ALCOHOL DEPENDENCE IN REMISSION (H): ICD-10-CM

## 2025-01-28 DIAGNOSIS — J41.0 SIMPLE CHRONIC BRONCHITIS (H): ICD-10-CM

## 2025-01-28 DIAGNOSIS — I10 ESSENTIAL HYPERTENSION, BENIGN: ICD-10-CM

## 2025-01-28 DIAGNOSIS — Z12.31 VISIT FOR SCREENING MAMMOGRAM: ICD-10-CM

## 2025-01-28 DIAGNOSIS — R41.89 COGNITIVE CHANGES: ICD-10-CM

## 2025-01-28 DIAGNOSIS — E78.5 HYPERLIPIDEMIA LDL GOAL <70: ICD-10-CM

## 2025-01-28 DIAGNOSIS — R73.09 ELEVATED GLUCOSE: ICD-10-CM

## 2025-01-28 DIAGNOSIS — Z87.891 HISTORY OF TOBACCO USE, PRESENTING HAZARDS TO HEALTH: ICD-10-CM

## 2025-01-28 LAB
ANION GAP SERPL CALCULATED.3IONS-SCNC: 17 MMOL/L (ref 7–15)
BUN SERPL-MCNC: 10.9 MG/DL (ref 8–23)
CALCIUM SERPL-MCNC: 9.8 MG/DL (ref 8.8–10.4)
CHLORIDE SERPL-SCNC: 103 MMOL/L (ref 98–107)
CHOLEST SERPL-MCNC: 190 MG/DL
CREAT SERPL-MCNC: 0.86 MG/DL (ref 0.51–0.95)
CREAT UR-MCNC: 48.9 MG/DL
EGFRCR SERPLBLD CKD-EPI 2021: 71 ML/MIN/1.73M2
EST. AVERAGE GLUCOSE BLD GHB EST-MCNC: 114 MG/DL
FASTING STATUS PATIENT QL REPORTED: NO
FASTING STATUS PATIENT QL REPORTED: NO
GLUCOSE SERPL-MCNC: 102 MG/DL (ref 70–99)
HBA1C MFR BLD: 5.6 % (ref 0–5.6)
HCO3 SERPL-SCNC: 23 MMOL/L (ref 22–29)
HDLC SERPL-MCNC: 98 MG/DL
LDLC SERPL CALC-MCNC: 75 MG/DL
MICROALBUMIN UR-MCNC: <12 MG/L
MICROALBUMIN/CREAT UR: NORMAL MG/G{CREAT}
NONHDLC SERPL-MCNC: 92 MG/DL
POTASSIUM SERPL-SCNC: 4 MMOL/L (ref 3.4–5.3)
SODIUM SERPL-SCNC: 143 MMOL/L (ref 135–145)
TRIGL SERPL-MCNC: 86 MG/DL

## 2025-01-28 PROCEDURE — 82570 ASSAY OF URINE CREATININE: CPT | Performed by: PHYSICIAN ASSISTANT

## 2025-01-28 PROCEDURE — 36415 COLL VENOUS BLD VENIPUNCTURE: CPT | Performed by: PHYSICIAN ASSISTANT

## 2025-01-28 PROCEDURE — 82043 UR ALBUMIN QUANTITATIVE: CPT | Performed by: PHYSICIAN ASSISTANT

## 2025-01-28 PROCEDURE — 80048 BASIC METABOLIC PNL TOTAL CA: CPT | Performed by: PHYSICIAN ASSISTANT

## 2025-01-28 PROCEDURE — 83036 HEMOGLOBIN GLYCOSYLATED A1C: CPT | Performed by: PHYSICIAN ASSISTANT

## 2025-01-28 PROCEDURE — 80061 LIPID PANEL: CPT | Performed by: PHYSICIAN ASSISTANT

## 2025-01-28 SDOH — HEALTH STABILITY: PHYSICAL HEALTH: ON AVERAGE, HOW MANY DAYS PER WEEK DO YOU ENGAGE IN MODERATE TO STRENUOUS EXERCISE (LIKE A BRISK WALK)?: 2 DAYS

## 2025-01-28 ASSESSMENT — SOCIAL DETERMINANTS OF HEALTH (SDOH): HOW OFTEN DO YOU GET TOGETHER WITH FRIENDS OR RELATIVES?: TWICE A WEEK

## 2025-01-28 ASSESSMENT — PAIN SCALES - GENERAL: PAINLEVEL_OUTOF10: NO PAIN (0)

## 2025-01-28 NOTE — PROGRESS NOTES
Preventive Care Visit  [facility]  Irma Saez PA-C, Family Medicine  Jan 28, 2025      Assessment & Plan   Encounter for Medicare annual wellness exam  - Recommended RSV vaccination at the pharmacy.     Essential hypertension, benign  She still sees Cardiology but they did note in last visit she can transition back to primary care for follow-up. She is on Cartia and Atorvastatin regularly without symptoms or concerns.   - BASIC METABOLIC PANEL; Future  - Albumin Random Urine Quantitative with Creat Ratio; Future  - BASIC METABOLIC PANEL  - Albumin Random Urine Quantitative with Creat Ratio    Hyperlipidemia LDL goal <70  Continue on Atorvastatin.  - Lipid panel reflex to direct LDL Non-fasting; Future  - Lipid panel reflex to direct LDL Non-fasting    Simple chronic bronchitis (H)  Only uses when having symptoms like when she is sick.  Refill Albuterol as well PRN.   - beclomethasone HFA (QVAR REDIHALER) 40 MCG/ACT inhaler; Inhale 1 puff into the lungs 2 times daily.    Elevated glucose  No prediabetes.   - Hemoglobin A1c; Future  - Hemoglobin A1c    Cognitive changes  This has been a new thing that started in October abruptly. She definitely throughout the visit is struggling more this year than in previous visit.  She is mixing up words, having word finding difficulty. She can still carry on a conversation. Her EEG showed no concerns. Her MRI showed general aging concerns and they deny sinus symptoms at this time. Recommended close follow-up with Neurology which is due this month. Son will schedule. Main discussion was around improving her nutrition. She isn't eating much and suspect this is making things worse. Recommended [email or web address] for the granddaughter and son to help her find more foods to increase to help with this.  Encouraged more activity.  She has some fall risks at home like slippers on carpet, she has a walker or maybe did have one but we talked about staying safe and  encouraged cane or walker use at this time, she doesn't want to but we discussed risks with falls, fracture, etc.     Alcohol dependence in remission (H)  No use.     History of tobacco use, presenting hazards to health  She is still smoking but down to about 1-1.5 packs per week.    Due for lung cancer screening.   She is using nicotine patch which has helped, added on Lozenge to use PRN for the acute cravings.   - CT Chest Lung Cancer Screen Low Dose Without; Future  - nicotine (NICORETTE) 4 MG lozenge; Place 1 lozenge (4 mg) inside cheek every hour as needed for nicotine withdrawal symptoms.  - SMOKING CESSATION COUNSELING 3-10 MIN    Visit for screening mammogram  Due  - MA Screen Bilateral w/Javier; Future            Nicotine/Tobacco Cessation  She reports that she has been smoking cigarettes. She has a 27.8 pack-year smoking history. She has been exposed to tobacco smoke. She has never used smokeless tobacco.  Nicotine/Tobacco Cessation Plan  Pharmacotherapies : Nicotine patch and Nicotine lozenge      Counseling  Appropriate preventive services were addressed with this patient via screening, questionnaire, or discussion as appropriate for fall prevention, nutrition, physical activity, Tobacco-use cessation, social engagement, weight loss and cognition.  Checklist reviewing preventive services available has been given to the patient.  Reviewed patient's diet, addressing concerns and/or questions.   She is at risk for lack of exercise and has been provided with information to increase physical activity for the benefit of her well-being.   The patient was instructed to see the dentist every 6 months.   Discussed possible causes of fatigue. Updated plan of care.  Patient reported difficulty with activities of daily living were addressed today.The patient was provided with written information regarding signs of hearing loss.           Subjective   Nelia is a 74 year old, presenting for the following:  Wellness  Visit        1/28/2025     3:22 PM   Additional Questions   Roomed by josseline   Accompanied by son and granddaughter         Patient would like to go over test results and the MRI      HPI          Health Care Directive  Patient has a Health Care Directive on file  Advance care planning document is on file and is current.      1/28/2025   General Health   How would you rate your overall physical health? (!) FAIR   Feel stress (tense, anxious, or unable to sleep) Only a little   (!) STRESS CONCERN      1/28/2025   Nutrition   Diet: Regular (no restrictions)         1/28/2025   Exercise   Days per week of moderate/strenous exercise 2 days   (!) EXERCISE CONCERN      1/28/2025   Social Factors   Frequency of gathering with friends or relatives Twice a week   Worry food won't last until get money to buy more No   Food not last or not have enough money for food? No   Do you have housing? (Housing is defined as stable permanent housing and does not include staying ouside in a car, in a tent, in an abandoned building, in an overnight shelter, or couch-surfing.) Yes   Are you worried about losing your housing? No   Lack of transportation? No   Unable to get utilities (heat,electricity)? No         1/28/2025   Fall Risk   Fallen 2 or more times in the past year? No   Trouble with walking or balance? No          1/28/2025   Activities of Daily Living- Home Safety   Needs help with the following daily activites Transportation    Shopping   Safety concerns in the home None of the above       Multiple values from one day are sorted in reverse-chronological order         1/28/2025   Dental   Dentist two times every year? (!) NO         1/28/2025   Hearing Screening   Hearing concerns? (!) I NEED TO ASK PEOPLE TO SPEAK UP OR REPEAT THEMSELVES.         1/28/2025   Driving Risk Screening   Patient/family members have concerns about driving No         1/28/2025   General Alertness/Fatigue Screening   Have you been more tired than  usual lately? (!) YES         1/28/2025   Urinary Incontinence Screening   Bothered by leaking urine in past 6 months No         1/28/2025   TB Screening   Were you born outside of the [place]? No         Today's PHQ-2 Score:       1/28/2025     3:25 PM   PHQ-2 ( 1999 [employer])   Q1: Little interest or pleasure in doing things 0   Q2: Feeling down, depressed or hopeless 1   PHQ-2 Score 1           1/28/2025   Substance Use   If I could quit smoking, I would Completely agree   I want to quit somking, worry about health affects Somewhat agree   Willing to make a plan to quit smoking Completely disagree   Willing to cut down before quitting Somewhat agree   Alcohol more than 3/day or more than 7/wk No   Do you have a current opioid prescription? No   How severe/bad is pain from 1 to 10? 0/10 (No Pain)   Do you use any other substances recreationally? No     Social History     Tobacco Use    Smoking status: Every Day     Current packs/day: 0.25     Average packs/day: 0.3 packs/day for 31.0 years (7.8 ttl pk-yrs)     Types: Cigarettes     Passive exposure: Current    Smokeless tobacco: Never   Vaping Use    Vaping status: Never Used   Substance Use Topics    Alcohol use: No     Alcohol/week: 23.3 standard drinks of alcohol     Comment: quit drinking    Drug use: No           7/6/2023   LAST FHS-7 RESULTS   1st degree relative breast or ovarian cancer No   Any relative bilateral breast cancer No   Any male have breast cancer No   Any ONE woman have BOTH breast AND ovarian cancer No   Any woman with breast cancer before 50yrs No   2 or more relatives with breast AND/OR ovarian cancer No   2 or more relatives with breast AND/OR bowel cancer No        Mammogram Screening - Mammogram every 1-2 years updated in Health Maintenance based on mutual decision making    ASCVD Risk   The 10-year ASCVD risk score (Lisa LINN, et al., 2019) is: 30.4%    Values used to calculate the score:      Age: 74 years      Sex: Female      Is  Non- : No      Diabetic: No      Tobacco smoker: Yes      Systolic Blood Pressure: 155 mmHg      Is BP treated: Yes      HDL Cholesterol: 91 mg/dL      Total Cholesterol: 185 mg/dL            Reviewed and updated as needed this visit by Provider                    Past Medical History:   Diagnosis Date    Acute alcoholic hepatitis (H)     Acute pancreatitis 08/07/06    aDMIT. dISCHARGED 08/10/06    Cancer of parotid gland (H) 1974    Chronic airway obstruction, not elsewhere classified     COPD (chronic obstructive pulmonary disease) (H) 3/30/2009    Diarrhea     Hyposmolality and/or hyponatremia     Macular degeneration 8/25/2016    Other and unspecified alcohol dependence, continuous drinking behavior     Other chronic nonalcoholic liver disease     Unspecified protein-calorie malnutrition      Past Surgical History:   Procedure Laterality Date    ARTHROPLASTY SHOULDER Right 5/26/2015    Procedure: ARTHROPLASTY SHOULDER;  Surgeon: Ashutosh Andrews DO;  Location: [facility]    CL AFF SURGICAL PATHOLOGY  1974    parotid tumor with malignancy    COLONOSCOPY  09/21/09    COLONOSCOPY N/A 7/6/2016    Procedure: COMBINED COLONOSCOPY, SINGLE OR MULTIPLE BIOPSY/POLYPECTOMY BY BIOPSY;  Surgeon: John Bellamy MD;  Location: [facility]    COLONOSCOPY N/A 7/24/2019    Procedure: COLONOSCOPY;  Surgeon: Kia Jean MD;  [facility]: [facility]    COLONOSCOPY N/A 6/27/2024    Procedure: COLONOSCOPY, FLEXIBLE, WITH LESION REMOVAL USING SNARE;  Surgeon: Isidoro Maria DO;  Location: [facility]    EXCISE MASS FOOT  1/17/2012    Procedure:EXCISE MASS FOOT; Excision of Mass Right Foot; Surgeon:CARRIE PAGAN; Location:[facility] OR    FOREIGN BODY REMOVAL  07/08/10    Soft tissue mass w/peripheral metal fragments    [facility] UGI ENDOSCOPY DIAG W BIOPSY  11/22/06    HYSTERECTOMY, PAP NO LONGER INDICATED      HYSTERECTOMY, FIDE      OPEN REDUCTION INTERNAL FIXATION WRIST Left 2/1/2019    Procedure: Left distal  radius open reduction and internal fixation;  Surgeon: Ashutosh Andrews DO;  Location: [facility]    [facility] REMOVAL OF OVARY(S)      [facility] COLONOSCOPY W BIOPSY  11/22/06    [facility] COLONOSCOPY W BIOPSY  10/31/07     BP Readings from Last 3 Encounters:   01/28/25 131/69   11/01/24 (!) 173/82   06/27/24 (!) 158/84    Wt Readings from Last 3 Encounters:   01/28/25 55.8 kg (123 lb)   11/01/24 55.8 kg (123 lb)   03/28/24 59 kg (130 lb)                  Patient Active Problem List   Diagnosis    Esophageal reflux    Slow transit constipation    Osteoporosis    COPD (chronic obstructive pulmonary disease) (H)    Atopic rhinitis    Hx of colonic polyp    Status post right shoulder hemiarthroplasty    Essential hypertension, benign    Prinzmetal angina    Hyperlipidemia LDL goal <70    Intermediate stage nonexudative age-related macular degeneration of both eyes    Legally blind    Cervical disc syndrome    Alcohol dependence (H)     Past Surgical History:   Procedure Laterality Date    ARTHROPLASTY SHOULDER Right 5/26/2015    Procedure: ARTHROPLASTY SHOULDER;  Surgeon: Ashutosh Andrews DO;  [facility]    CL AFF SURGICAL PATHOLOGY  1974    parotid tumor with malignancy    COLONOSCOPY  09/21/09    COLONOSCOPY N/A 7/6/2016    Procedure: COMBINED COLONOSCOPY, SINGLE OR MULTIPLE BIOPSY/POLYPECTOMY BY BIOPSY;  Surgeon: John Bellamy MD;  [facility]    COLONOSCOPY N/A 7/24/2019    Procedure: COLONOSCOPY;  Surgeon: Kia Jean MD;  [facility]    COLONOSCOPY N/A 6/27/2024    Procedure: COLONOSCOPY, FLEXIBLE, WITH LESION REMOVAL USING SNARE;  Surgeon: Isidoro Maria DO;  [facility]    EXCISE MASS FOOT  1/17/2012    Procedure:EXCISE MASS FOOT; Excision of Mass Right Foot; Surgeon:CARRIE PAGAN; Location:[facility]    FOREIGN BODY REMOVAL  07/08/10    Soft tissue mass w/peripheral metal fragments    [facility] UGI ENDOSCOPY DIAG W BIOPSY  11/22/06    HYSTERECTOMY, PAP NO LONGER INDICATED       HYSTERECTOMY, FIDE      OPEN REDUCTION INTERNAL FIXATION WRIST Left 2/1/2019    Procedure: Left distal radius open reduction and internal fixation;  Surgeon: Ashutosh Andrews DO;  Location:  OR    [facility] REMOVAL OF OVARY(S)      [facility] COLONOSCOPY W BIOPSY  11/22/06    [facility]  [facility]       Social History     Tobacco Use    Smoking status: Every Day     Current packs/day: 0.25     Average packs/day: 0.5 packs/day for 51.0 years (27.8 ttl pk-yrs)     Types: Cigarettes     Passive exposure: Current    Smokeless tobacco: Never   Substance Use Topics    Alcohol use: No     Alcohol/week: 23.3 standard drinks of alcohol     Comment: quit drinking     Family History   Problem Relation Age of Onset    Arthritis Mother         RA    Cancer Mother         female organs    Cancer Father         colon         Current Outpatient Medications   Medication Sig Dispense Refill    albuterol (PROAIR HFA/PROVENTIL HFA/VENTOLIN HFA) 108 (90 Base) MCG/ACT inhaler INHALE ONE OR TWO PUFFS BY MOUTH EVERY FOUR HOURS AS NEEDED 8.5 g 0    atorvastatin (LIPITOR) 40 MG tablet Take 40 mg by mouth daily      beclomethasone HFA (QVAR REDIHALER) 40 MCG/ACT inhaler Inhale 1 puff into the lungs 2 times daily. 10.6 g 5    nicotine (NICODERM CQ) 14 MG/24HR 24 hr patch Place 1 patch onto the skin every 24 hours 30 patch 1    nicotine (NICORETTE) 4 MG lozenge Place 1 lozenge (4 mg) inside cheek every hour as needed for nicotine withdrawal symptoms. 108 lozenge 1    nitroGLYcerin (NITROSTAT) 0.4 MG sublingual tablet Place 0.4 mg under the tongue every 5 minutes as needed for chest pain      POTASSIUM 75 MG OR TABS 1 TABLET  DAILY      VITAMIN B-12 500 MCG OR TABS daily      VITAMIN D-3 SUPER STRENGTH 2000 UNIT OR TABS daily      aspirin 81 MG tablet Take 1 tablet (81 mg) by mouth daily (Patient not taking: Reported on 1/28/2025) 90 tablet 3    CARTIA  MG 24 hr capsule Take 180 mg by mouth daily (Patient not taking: Reported  on 1/28/2025)  8    ketoconazole (NIZORAL) 2 % external cream Apply topically 2 times daily (Patient not taking: Reported on 1/28/2025) 60 g 0    VITAMIN C 500 MG OR TABS ONE TABLET DAILY (Patient not taking: Reported on 1/28/2025) 90 Tab 3     Allergies   Allergen Reactions    No Known Drug Allergy     Nickel Itching, Rash and Swelling     Current providers sharing in care for this patient include:  Patient Care Team:  Irma Saez PA-C as PCP - General (Family Medicine)  Irma Saez PA-C as Assigned PCP  Gela Pack AuD as Audiologist (Audiology)  Irma Saez PA-C as Physician Assistant (Family Medicine)  Dixie Akhtar PA-C as Physician Assistant (Dermatology)  Juan Sewell MD as Assigned Musculoskeletal Provider  Shira Sanders MD as Assigned Neuroscience Provider    The following health maintenance items are reviewed in Epic and correct as of today:  Health Maintenance   Topic Date Due    RSV VACCINE (1 - Risk 60-74 years 1-dose series) Never done    NICOTINE/TOBACCO CESSATION COUNSELING Q 1 YR  12/12/2024    MEDICARE ANNUAL WELLNESS VISIT  12/12/2024    BMP  12/12/2024    LIPID  12/12/2024    ANNUAL REVIEW OF HM ORDERS  12/12/2024    LUNG CANCER SCREENING  12/18/2024    MAMMO SCREENING  07/06/2025    FALL RISK ASSESSMENT  01/28/2026    GLUCOSE  12/12/2026    COLORECTAL CANCER SCREENING  06/27/2027    ADVANCE CARE PLANNING  03/28/2029    DTAP/TDAP/TD IMMUNIZATION (3 - Td or Tdap) 09/03/2031    DEXA  06/06/2034    SPIROMETRY  Completed    HEPATITIS C SCREENING  Completed    COPD ACTION PLAN  Completed    PHQ-2 (once per calendar year)  Completed    Pneumococcal Vaccine: 50+ Years  Completed    ZOSTER IMMUNIZATION  Completed    HPV IMMUNIZATION  Aged Out    MENINGITIS IMMUNIZATION  Aged Out    RSV MONOCLONAL ANTIBODY  Aged Out    INFLUENZA VACCINE  Discontinued    COVID-19 Vaccine  Discontinued         Review of Systems  Constitutional, HEENT, cardiovascular, pulmonary, GI, ,  "musculoskeletal, neuro, skin, endocrine and psych systems are negative, except as otherwise noted.     Objective    Exam  BP (!) 155/79   Pulse 65   Temp 98.3  F (36.8  C) (Temporal)   Resp 15   Ht 1.571 m (5' 1.85\")   Wt 55.8 kg (123 lb)   SpO2 98%   BMI 22.61 kg/m     Estimated body mass index is 22.61 kg/m  as calculated from the following:    Height as of this encounter: 1.571 m (5' 1.85\").    Weight as of this encounter: 55.8 kg (123 lb).    Physical Exam  GENERAL: alert and no distress  EYES: Eyes grossly normal to inspection, PERRL and conjunctivae and sclerae normal  HENT: ear canals and TM's normal, nose and mouth without ulcers or lesions  NECK: no adenopathy, no asymmetry, masses, or scars  RESP: lungs clear to auscultation - no rales, rhonchi or wheezes  CV: regular rate and rhythm, normal S1 S2, no S3 or S4, no murmur, click or rub, no peripheral edema  ABDOMEN: soft, nontender, no hepatosplenomegaly, no masses and bowel sounds normal  MS: no gross musculoskeletal defects noted, no edema  SKIN: no suspicious lesions or rashes  NEURO: Normal strength and tone, mentation intact and speech normal  PSYCH: mentation appears normal, affect normal/bright         No data to display                       Signed Electronically by: Irma Saez PA-C    "

## 2025-01-28 NOTE — PATIENT INSTRUCTIONS
Patient Education   Preventive Care Advice   This is general advice given by our system to help you stay healthy. However, your care team may have specific advice just for you. Please talk to your care team about your preventive care needs.  Nutrition  Eat 5 or more servings of fruits and vegetables each day.  Try wheat bread, brown rice and whole grain pasta (instead of white bread, rice, and pasta).  Get enough calcium and vitamin D. Check the label on foods and aim for 100% of the RDA (recommended daily allowance).  Lifestyle  Exercise at least 150 minutes each week  (30 minutes a day, 5 days a week).  Do muscle strengthening activities 2 days a week. These help control your weight and prevent disease.  No smoking.  Wear sunscreen to prevent skin cancer.  Have a dental exam and cleaning every 6 months.  Yearly exams  See your health care team every year to talk about:  Any changes in your health.  Any medicines your care team has prescribed.  Preventive care, family planning, and ways to prevent chronic diseases.  Shots (vaccines)   HPV shots (up to age 26), if you've never had them before.  Hepatitis B shots (up to age 59), if you've never had them before.  COVID-19 shot: Get this shot when it's due.  Flu shot: Get a flu shot every year.  Tetanus shot: Get a tetanus shot every 10 years.  Pneumococcal, hepatitis A, and RSV shots: Ask your care team if you need these based on your risk.  Shingles shot (for age 50 and up)  General health tests  Diabetes screening:  Starting at age 35, Get screened for diabetes at least every 3 years.  If you are younger than age 35, ask your care team if you should be screened for diabetes.  Cholesterol test: At age 39, start having a cholesterol test every 5 years, or more often if advised.  Bone density scan (DEXA): At age 50, ask your care team if you should have this scan for osteoporosis (brittle bones).  Hepatitis C: Get tested at least once in your life.  STIs (sexually  transmitted infections)  Before age 24: Ask your care team if you should be screened for STIs.  After age 24: Get screened for STIs if you're at risk. You are at risk for STIs (including HIV) if:  You are sexually active with more than one person.  You don't use condoms every time.  You or a partner was diagnosed with a sexually transmitted infection.  If you are at risk for HIV, ask about PrEP medicine to prevent HIV.  Get tested for HIV at least once in your life, whether you are at risk for HIV or not.  Cancer screening tests  Cervical cancer screening: If you have a cervix, begin getting regular cervical cancer screening tests starting at age 21.  Breast cancer scan (mammogram): If you've ever had breasts, begin having regular mammograms starting at age 40. This is a scan to check for breast cancer.  Colon cancer screening: It is important to start screening for colon cancer at age 45.  Have a colonoscopy test every 10 years (or more often if you're at risk) Or, ask your provider about stool tests like a FIT test every year or Cologuard test every 3 years.  To learn more about your testing options, visit:   .  For help making a decision, visit:   https://bit.ly/en42639.  Prostate cancer screening test: If you have a prostate, ask your care team if a prostate cancer screening test (PSA) at age 55 is right for you.  Lung cancer screening: If you are a current or former smoker ages 50 to 80, ask your care team if ongoing lung cancer screenings are right for you.  For informational purposes only. Not to replace the advice of your health care provider. Copyright   2023 [facility]. All rights reserved. Clinically reviewed by the [facility] Transitions Program. [employer] [id number] - REV 01/24.  Learning About Activities of Daily Living  What are activities of daily living?     Activities of daily living (ADLs) are the basic self-care tasks you do every day. These include eating, bathing, dressing,  and moving around.  As you age, and if you have health problems, you may find that it's harder to do some of these tasks. If so, your doctor can suggest ideas that may help.  To measure what kind of help you may need, your doctor will ask how well you are able to do ADLs. Let your doctor know if there are any tasks that you are having trouble doing. This is an important first step to getting help. And when you have the help you need, you can stay as independent as possible.  How will a doctor assess your ADLs?  Asking about ADLs is part of a routine health checkup your doctor will likely do as you age. Your health check might be done in a doctor's office, in your home, or at a hospital. The goal is to find out if you are having any problems that could make it hard to care for yourself or that make it unsafe for you to be on your own.  To measure your ADLs, your doctor will ask how hard it is for you to do routine tasks. Your doctor may also want to know if you have changed the way you do a task because of a health problem. Your doctor may watch how you:  Walk back and forth.  Keep your balance while you stand or walk.  Move from sitting to standing or from a bed to a chair.  Button or unbutton a shirt or sweater.  Remove and put on your shoes.  It's common to feel a little worried or anxious if you find you can't do all the things you used to be able to do. Talking with your doctor about ADLs is a way to make sure you're as safe as possible and able to care for yourself as well as you can. You may want to bring a caregiver, friend, or family member to your checkup. They can help you talk to your doctor.  Follow-up care is a key part of your treatment and safety. Be sure to make and go to all appointments, and call your doctor if you are having problems. It's also a good idea to know your test results and keep a list of the medicines you take.  Current as of: October 24, 2023  Content Version: 14.3    2024 [facility]  [employer].   Care instructions adapted under license by your healthcare professional. If you have questions about a medical condition or this instruction, always ask your healthcare professional. [employer] disclaims any warranty or liability for your use of this information.    Hearing Loss: Care Instructions  Overview     Hearing loss is a sudden or slow decrease in how well you hear. It can range from slight to profound. Permanent hearing loss can occur with aging. It also can happen when you are exposed long-term to loud noise. Examples include listening to loud music, riding motorcycles, or being around other loud machines.  Hearing loss can affect your work and home life. It can make you feel lonely or depressed. You may feel that you have lost your independence. But hearing aids and other devices can help you hear better and feel connected to others.  Follow-up care is a key part of your treatment and safety. Be sure to make and go to all appointments, and call your doctor if you are having problems. It's also a good idea to know your test results and keep a list of the medicines you take.  How can you care for yourself at home?  Avoid loud noises whenever possible. This helps keep your hearing from getting worse.  Always wear hearing protection around loud noises.  Wear a hearing aid as directed.  A professional can help you pick a hearing aid that will work best for you.  You can also get hearing aids over the counter for mild to moderate hearing loss.  Have hearing tests as your doctor suggests. They can show whether your hearing has changed. Your hearing aid may need to be adjusted.  Use other devices as needed. These may include:  Telephone amplifiers and hearing aids that can connect to a television, stereo, radio, or microphone.  Devices that use lights or vibrations. These alert you to the doorbell, a ringing telephone, or a baby monitor.  Television closed-captioning. This shows the  "words at the bottom of the screen. Most new TVs can do this.  TTY (text telephone). This lets you type messages back and forth on the telephone instead of talking or listening. These devices are also called TDD. When messages are typed on the keyboard, they are sent over the phone line to a receiving TTY. The message is shown on a monitor.  Use text messaging, social media, and email if it is hard for you to communicate by telephone.  Try to learn a listening technique called speechreading. It is not lipreading. You pay attention to people's gestures, expressions, posture, and tone of voice. These clues can help you understand what a person is saying. Face the person you are talking to, and have them face you. Make sure the lighting is good. You need to see the other person's face clearly.  Think about counseling if you need help to adjust to your hearing loss.  When should you call for help?  Watch closely for changes in your health, and be sure to contact your doctor if:    You think your hearing is getting worse.     You have new symptoms, such as dizziness or nausea.   Where can you learn more?  Go to https://www.[employer].com.net/patiented  Enter R798 in the search box to learn more about \"Hearing Loss: Care Instructions.\"  Current as of: September 27, 2023  Content Version: 14.3    2024 [employer].   Care instructions adapted under license by your healthcare professional. If you have questions about a medical condition or this instruction, always ask your healthcare professional. [employer] disclaims any warranty or liability for your use of this information.    Learning About Sleeping Well  What does sleeping well mean?     Sleeping well means getting enough sleep to feel good and stay healthy. How much sleep is enough varies among people.  The number of hours you sleep and how you feel when you wake up are both important. If you do not feel refreshed, you probably need more sleep. " "Another sign of not getting enough sleep is feeling tired during the day.  Experts recommend that adults get at least 7 or more hours of sleep per day. Children and older adults need more sleep.  Why is getting enough sleep important?  Getting enough quality sleep is a basic part of good health. When your sleep suffers, your physical health, mood, and your thoughts can suffer too. You may find yourself feeling more grumpy or stressed. Not getting enough sleep also can lead to serious problems, including injury, accidents, anxiety, and depression.  What might cause poor sleeping?  Many things can cause sleep problems, including:  Changes to your sleep schedule.  Stress. Stress can be caused by fear about a single event, such as giving a speech. Or you may have ongoing stress, such as worry about work or school.  Depression, anxiety, and other mental or emotional conditions.  Changes in your sleep habits or surroundings. This includes changes that happen where you sleep, such as noise, light, or sleeping in a different bed. It also includes changes in your sleep pattern, such as having jet lag or working a late shift.  Health problems, such as pain, breathing problems, and restless legs syndrome.  Lack of regular exercise.  Using alcohol, nicotine, or caffeine before bed.  How can you help yourself?  Here are some tips that may help you sleep more soundly and wake up feeling more refreshed.  Your sleeping area   Use your bedroom only for sleeping and sex. A bit of light reading may help you fall asleep. But if it doesn't, do your reading elsewhere in the house. Try not to use your TV, computer, smartphone, or tablet while you are in bed.  Be sure your bed is big enough to stretch out comfortably, especially if you have a sleep partner.  Keep your bedroom quiet, dark, and cool. Use curtains, blinds, or a sleep mask to block out light. To block out noise, use earplugs, soothing music, or a \"white noise\" machine.  Your " "evening and bedtime routine   Create a relaxing bedtime routine. You might want to take a warm shower or bath, or listen to soothing music.  Go to bed at the same time every night. And get up at the same time every morning, even if you feel tired.  What to avoid   Limit caffeine (coffee, tea, caffeinated sodas) during the day, and don't have any for at least 6 hours before bedtime.  Avoid drinking alcohol before bedtime. Alcohol can cause you to wake up more often during the night.  Try not to smoke or use tobacco, especially in the evening. Nicotine can keep you awake.  Limit naps during the day, especially close to bedtime.  Avoid lying in bed awake for too long. If you can't fall asleep or if you wake up in the middle of the night and can't get back to sleep within about 20 minutes, get out of bed and go to another room until you feel sleepy.  Avoid taking medicine right before bed that may keep you awake or make you feel hyper or energized. Your doctor can tell you if your medicine may do this and if you can take it earlier in the day.  If you can't sleep   Imagine yourself in a peaceful, pleasant scene. Focus on the details and feelings of being in a place that is relaxing.  Get up and do a quiet or boring activity until you feel sleepy.  Avoid drinking any liquids before going to bed to help prevent waking up often to use the bathroom.  Where can you learn more?  Go to https://www.[employer].net/patiented  Enter J942 in the search box to learn more about \"Learning About Sleeping Well.\"  Current as of: July 31, 2024  Content Version: 14.3    2024 [employer].   Care instructions adapted under license by your healthcare professional. If you have questions about a medical condition or this instruction, always ask your healthcare professional. [employer] disclaims any warranty or liability for your use of this information.       "

## 2025-01-29 RX ORDER — BECLOMETHASONE DIPROPIONATE HFA 40 UG/1
1 AEROSOL, METERED RESPIRATORY (INHALATION) 2 TIMES DAILY
Qty: 10.6 G | Refills: 5 | Status: SHIPPED | OUTPATIENT
Start: 2025-01-29

## 2025-01-30 ENCOUNTER — PATIENT OUTREACH (OUTPATIENT)
Dept: CARE COORDINATION | Facility: CLINIC | Age: 75
End: 2025-01-30
Payer: COMMERCIAL

## 2025-04-02 ENCOUNTER — TELEPHONE (OUTPATIENT)
Dept: NEUROLOGY | Facility: CLINIC | Age: 75
End: 2025-04-02

## (undated) DEVICE — TUBING SUCTION 12"X1/4" N612

## (undated) DEVICE — DRILL BIT HANDINN 2.5MM DB2.5

## (undated) DEVICE — DRAPE STERI TOWEL SM 1000

## (undated) DEVICE — PREP CHLORAPREP 26ML TINTED ORANGE  260815

## (undated) DEVICE — ENDO SNARE EXACTO COLD 9MM LOOP 2.4MMX230CM 00711115

## (undated) DEVICE — KIT ENDO TURNOVER/PROCEDURE CARRY-ON 101822

## (undated) DEVICE — GOWN XLG DISP 9545

## (undated) DEVICE — GLOVE PROTEXIS W/NEU-THERA 6.5  2D73TE65

## (undated) DEVICE — GLOVE PROTEXIS W/NEU-THERA 7.5  2D73TE75

## (undated) DEVICE — SYR BULB IRRIG 50ML LATEX FREE 0035280

## (undated) DEVICE — DRSG GAUZE 4X4" TRAY

## (undated) DEVICE — DRAPE SHEET REV FOLD 3/4 9349

## (undated) DEVICE — SOL NACL 0.9% IRRIG 1000ML BOTTLE 07138-09

## (undated) DEVICE — SU VICRYL 2-0 CP-2 18" UND J762D

## (undated) DEVICE — CAST PLASTER SPLINT 4X15" EXTRA FAST

## (undated) DEVICE — GLOVE PROTEXIS BLUE W/NEU-THERA 8.0  2D73EB80

## (undated) DEVICE — PACK HAND WRIST FOREARM CUSTOM

## (undated) DEVICE — BNDG ESMARK 4" STERILE

## (undated) DEVICE — GLOVE PROTEXIS BLUE W/NEU-THERA 6.5  2D73EB65

## (undated) DEVICE — CAST PADDING 4" WEBRIL STERILE

## (undated) DEVICE — GLOVE PROTEXIS W/NEU-THERA 8.5  2D73TE85

## (undated) DEVICE — TUBING SUCTION 6"X3/16" N56A

## (undated) DEVICE — SOL WATER IRRIG 1000ML BOTTLE 2F7114

## (undated) DEVICE — BNDG ELASTIC 3"X5YDS UNSTERILE 6611-30

## (undated) DEVICE — SU ETHILON 3-0 PS-2 18" 1669H

## (undated) DEVICE — SOL WATER IRRIG 1000ML BOTTLE 07139-09

## (undated) DEVICE — IMP SCR CORTICAL HANDINN 3.5X10MM CS10000
Type: IMPLANTABLE DEVICE | Site: WRIST | Status: NON-FUNCTIONAL
Removed: 2019-02-01

## (undated) DEVICE — ESU GROUND PAD UNIVERSAL W/O CORD

## (undated) DEVICE — BASIN SET MINOR DISP

## (undated) DEVICE — IMP SCR CORTICAL HANDINN 3.5X16MM CS16000
Type: IMPLANTABLE DEVICE | Site: WRIST | Status: NON-FUNCTIONAL
Removed: 2019-02-01

## (undated) RX ORDER — BACITRACIN 50000 [IU]/1
INJECTION, POWDER, FOR SOLUTION INTRAMUSCULAR
Status: DISPENSED
Start: 2019-02-01

## (undated) RX ORDER — LIDOCAINE HYDROCHLORIDE 20 MG/ML
INJECTION, SOLUTION EPIDURAL; INFILTRATION; INTRACAUDAL; PERINEURAL
Status: DISPENSED
Start: 2019-02-01

## (undated) RX ORDER — PROPOFOL 10 MG/ML
INJECTION, EMULSION INTRAVENOUS
Status: DISPENSED
Start: 2019-02-01

## (undated) RX ORDER — FENTANYL CITRATE 50 UG/ML
INJECTION, SOLUTION INTRAMUSCULAR; INTRAVENOUS
Status: DISPENSED
Start: 2019-02-01

## (undated) RX ORDER — DEXAMETHASONE SODIUM PHOSPHATE 10 MG/ML
INJECTION INTRAMUSCULAR; INTRAVENOUS
Status: DISPENSED
Start: 2019-02-01

## (undated) RX ORDER — LIDOCAINE HYDROCHLORIDE 10 MG/ML
INJECTION, SOLUTION EPIDURAL; INFILTRATION; INTRACAUDAL; PERINEURAL
Status: DISPENSED
Start: 2019-07-24

## (undated) RX ORDER — BUPIVACAINE HYDROCHLORIDE 5 MG/ML
INJECTION, SOLUTION EPIDURAL; INTRACAUDAL
Status: DISPENSED
Start: 2019-02-01

## (undated) RX ORDER — ONDANSETRON 2 MG/ML
INJECTION INTRAMUSCULAR; INTRAVENOUS
Status: DISPENSED
Start: 2019-02-01